# Patient Record
Sex: FEMALE | Race: WHITE | HISPANIC OR LATINO | URBAN - METROPOLITAN AREA
[De-identification: names, ages, dates, MRNs, and addresses within clinical notes are randomized per-mention and may not be internally consistent; named-entity substitution may affect disease eponyms.]

---

## 2021-08-26 ENCOUNTER — INPATIENT (INPATIENT)
Facility: HOSPITAL | Age: 86
LOS: 2 days | Discharge: ANOTHER HOSPITAL | DRG: 292 | End: 2021-08-29
Attending: INTERNAL MEDICINE | Admitting: INTERNAL MEDICINE
Payer: MEDICARE

## 2021-08-26 VITALS
DIASTOLIC BLOOD PRESSURE: 69 MMHG | TEMPERATURE: 98 F | OXYGEN SATURATION: 98 % | SYSTOLIC BLOOD PRESSURE: 115 MMHG | HEART RATE: 78 BPM | RESPIRATION RATE: 18 BRPM | HEIGHT: 63 IN | WEIGHT: 100.09 LBS

## 2021-08-26 DIAGNOSIS — Z95.0 PRESENCE OF CARDIAC PACEMAKER: ICD-10-CM

## 2021-08-26 DIAGNOSIS — Z29.9 ENCOUNTER FOR PROPHYLACTIC MEASURES, UNSPECIFIED: ICD-10-CM

## 2021-08-26 DIAGNOSIS — E11.9 TYPE 2 DIABETES MELLITUS WITHOUT COMPLICATIONS: ICD-10-CM

## 2021-08-26 DIAGNOSIS — E05.90 THYROTOXICOSIS, UNSPECIFIED WITHOUT THYROTOXIC CRISIS OR STORM: ICD-10-CM

## 2021-08-26 DIAGNOSIS — I10 ESSENTIAL (PRIMARY) HYPERTENSION: ICD-10-CM

## 2021-08-26 DIAGNOSIS — R74.01 ELEVATION OF LEVELS OF LIVER TRANSAMINASE LEVELS: ICD-10-CM

## 2021-08-26 DIAGNOSIS — I50.23 ACUTE ON CHRONIC SYSTOLIC (CONGESTIVE) HEART FAILURE: ICD-10-CM

## 2021-08-26 DIAGNOSIS — D69.6 THROMBOCYTOPENIA, UNSPECIFIED: ICD-10-CM

## 2021-08-26 DIAGNOSIS — I50.9 HEART FAILURE, UNSPECIFIED: ICD-10-CM

## 2021-08-26 DIAGNOSIS — D64.9 ANEMIA, UNSPECIFIED: ICD-10-CM

## 2021-08-26 DIAGNOSIS — N17.9 ACUTE KIDNEY FAILURE, UNSPECIFIED: ICD-10-CM

## 2021-08-26 DIAGNOSIS — E03.9 HYPOTHYROIDISM, UNSPECIFIED: ICD-10-CM

## 2021-08-26 DIAGNOSIS — I34.0 NONRHEUMATIC MITRAL (VALVE) INSUFFICIENCY: ICD-10-CM

## 2021-08-26 DIAGNOSIS — I48.20 CHRONIC ATRIAL FIBRILLATION, UNSPECIFIED: ICD-10-CM

## 2021-08-26 LAB
ALBUMIN SERPL ELPH-MCNC: 3.1 G/DL — LOW (ref 3.5–5)
ALP SERPL-CCNC: 188 U/L — HIGH (ref 40–120)
ALT FLD-CCNC: 177 U/L DA — HIGH (ref 10–60)
ANION GAP SERPL CALC-SCNC: 10 MMOL/L — SIGNIFICANT CHANGE UP (ref 5–17)
APTT BLD: 45.6 SEC — HIGH (ref 27.5–35.5)
AST SERPL-CCNC: 91 U/L — HIGH (ref 10–40)
BASOPHILS # BLD AUTO: 0.01 K/UL — SIGNIFICANT CHANGE UP (ref 0–0.2)
BASOPHILS NFR BLD AUTO: 0.1 % — SIGNIFICANT CHANGE UP (ref 0–2)
BILIRUB SERPL-MCNC: 1.2 MG/DL — SIGNIFICANT CHANGE UP (ref 0.2–1.2)
BUN SERPL-MCNC: 76 MG/DL — HIGH (ref 7–18)
CALCIUM SERPL-MCNC: 8.3 MG/DL — LOW (ref 8.4–10.5)
CHLORIDE SERPL-SCNC: 97 MMOL/L — SIGNIFICANT CHANGE UP (ref 96–108)
CO2 SERPL-SCNC: 29 MMOL/L — SIGNIFICANT CHANGE UP (ref 22–31)
CREAT SERPL-MCNC: 1.81 MG/DL — HIGH (ref 0.5–1.3)
EOSINOPHIL # BLD AUTO: 0.13 K/UL — SIGNIFICANT CHANGE UP (ref 0–0.5)
EOSINOPHIL NFR BLD AUTO: 1.5 % — SIGNIFICANT CHANGE UP (ref 0–6)
GLUCOSE BLDC GLUCOMTR-MCNC: 162 MG/DL — HIGH (ref 70–99)
GLUCOSE SERPL-MCNC: 186 MG/DL — HIGH (ref 70–99)
HCT VFR BLD CALC: 27.9 % — LOW (ref 34.5–45)
HGB BLD-MCNC: 8.6 G/DL — LOW (ref 11.5–15.5)
IMM GRANULOCYTES NFR BLD AUTO: 0.4 % — SIGNIFICANT CHANGE UP (ref 0–1.5)
INR BLD: 2.81 RATIO — HIGH (ref 0.88–1.16)
LYMPHOCYTES # BLD AUTO: 1.28 K/UL — SIGNIFICANT CHANGE UP (ref 1–3.3)
LYMPHOCYTES # BLD AUTO: 15 % — SIGNIFICANT CHANGE UP (ref 13–44)
MCHC RBC-ENTMCNC: 24 PG — LOW (ref 27–34)
MCHC RBC-ENTMCNC: 30.8 GM/DL — LOW (ref 32–36)
MCV RBC AUTO: 77.7 FL — LOW (ref 80–100)
MONOCYTES # BLD AUTO: 1.27 K/UL — HIGH (ref 0–0.9)
MONOCYTES NFR BLD AUTO: 14.9 % — HIGH (ref 2–14)
NEUTROPHILS # BLD AUTO: 5.79 K/UL — SIGNIFICANT CHANGE UP (ref 1.8–7.4)
NEUTROPHILS NFR BLD AUTO: 68.1 % — SIGNIFICANT CHANGE UP (ref 43–77)
NRBC # BLD: 0 /100 WBCS — SIGNIFICANT CHANGE UP (ref 0–0)
NT-PROBNP SERPL-SCNC: 3718 PG/ML — HIGH (ref 0–450)
PLATELET # BLD AUTO: 130 K/UL — LOW (ref 150–400)
POTASSIUM SERPL-MCNC: 4.5 MMOL/L — SIGNIFICANT CHANGE UP (ref 3.5–5.3)
POTASSIUM SERPL-SCNC: 4.5 MMOL/L — SIGNIFICANT CHANGE UP (ref 3.5–5.3)
PROT SERPL-MCNC: 6.1 G/DL — SIGNIFICANT CHANGE UP (ref 6–8.3)
PROTHROM AB SERPL-ACNC: 31.8 SEC — HIGH (ref 10.6–13.6)
RBC # BLD: 3.59 M/UL — LOW (ref 3.8–5.2)
RBC # FLD: 16.5 % — HIGH (ref 10.3–14.5)
SARS-COV-2 RNA SPEC QL NAA+PROBE: SIGNIFICANT CHANGE UP
SODIUM SERPL-SCNC: 136 MMOL/L — SIGNIFICANT CHANGE UP (ref 135–145)
TROPONIN I SERPL-MCNC: 0.11 NG/ML — HIGH (ref 0–0.04)
WBC # BLD: 8.51 K/UL — SIGNIFICANT CHANGE UP (ref 3.8–10.5)
WBC # FLD AUTO: 8.51 K/UL — SIGNIFICANT CHANGE UP (ref 3.8–10.5)

## 2021-08-26 PROCEDURE — 99285 EMERGENCY DEPT VISIT HI MDM: CPT

## 2021-08-26 PROCEDURE — 71045 X-RAY EXAM CHEST 1 VIEW: CPT | Mod: 26

## 2021-08-26 PROCEDURE — 93010 ELECTROCARDIOGRAM REPORT: CPT

## 2021-08-26 RX ORDER — FUROSEMIDE 40 MG
40 TABLET ORAL ONCE
Refills: 0 | Status: COMPLETED | OUTPATIENT
Start: 2021-08-26 | End: 2021-08-26

## 2021-08-26 RX ORDER — LOSARTAN POTASSIUM 100 MG/1
25 TABLET, FILM COATED ORAL DAILY
Refills: 0 | Status: DISCONTINUED | OUTPATIENT
Start: 2021-08-26 | End: 2021-08-26

## 2021-08-26 RX ORDER — ENOXAPARIN SODIUM 100 MG/ML
30 INJECTION SUBCUTANEOUS EVERY 12 HOURS
Refills: 0 | Status: DISCONTINUED | OUTPATIENT
Start: 2021-08-26 | End: 2021-08-27

## 2021-08-26 RX ORDER — ALBUTEROL 90 UG/1
1 AEROSOL, METERED ORAL EVERY 4 HOURS
Refills: 0 | Status: DISCONTINUED | OUTPATIENT
Start: 2021-08-26 | End: 2021-08-29

## 2021-08-26 RX ORDER — POLYETHYLENE GLYCOL 3350 17 G/17G
17 POWDER, FOR SOLUTION ORAL DAILY
Refills: 0 | Status: DISCONTINUED | OUTPATIENT
Start: 2021-08-26 | End: 2021-08-26

## 2021-08-26 RX ORDER — FUROSEMIDE 40 MG
40 TABLET ORAL EVERY 12 HOURS
Refills: 0 | Status: DISCONTINUED | OUTPATIENT
Start: 2021-08-26 | End: 2021-08-29

## 2021-08-26 RX ORDER — MONTELUKAST 4 MG/1
10 TABLET, CHEWABLE ORAL DAILY
Refills: 0 | Status: DISCONTINUED | OUTPATIENT
Start: 2021-08-26 | End: 2021-08-26

## 2021-08-26 RX ORDER — ENOXAPARIN SODIUM 100 MG/ML
40 INJECTION SUBCUTANEOUS EVERY 12 HOURS
Refills: 0 | Status: DISCONTINUED | OUTPATIENT
Start: 2021-08-26 | End: 2021-08-26

## 2021-08-26 RX ORDER — FERROUS SULFATE 325(65) MG
325 TABLET ORAL
Refills: 0 | Status: DISCONTINUED | OUTPATIENT
Start: 2021-08-26 | End: 2021-08-26

## 2021-08-26 RX ORDER — ATORVASTATIN CALCIUM 80 MG/1
10 TABLET, FILM COATED ORAL AT BEDTIME
Refills: 0 | Status: DISCONTINUED | OUTPATIENT
Start: 2021-08-26 | End: 2021-08-26

## 2021-08-26 RX ORDER — FOLIC ACID 0.8 MG
1 TABLET ORAL DAILY
Refills: 0 | Status: DISCONTINUED | OUTPATIENT
Start: 2021-08-26 | End: 2021-08-29

## 2021-08-26 RX ORDER — SENNA PLUS 8.6 MG/1
2 TABLET ORAL AT BEDTIME
Refills: 0 | Status: DISCONTINUED | OUTPATIENT
Start: 2021-08-26 | End: 2021-08-26

## 2021-08-26 RX ORDER — DIGOXIN 250 MCG
100 TABLET ORAL DAILY
Refills: 0 | Status: DISCONTINUED | OUTPATIENT
Start: 2021-08-26 | End: 2021-08-29

## 2021-08-26 RX ORDER — DIGOXIN 250 MCG
125 TABLET ORAL DAILY
Refills: 0 | Status: DISCONTINUED | OUTPATIENT
Start: 2021-08-26 | End: 2021-08-26

## 2021-08-26 RX ORDER — INSULIN LISPRO 100/ML
VIAL (ML) SUBCUTANEOUS
Refills: 0 | Status: DISCONTINUED | OUTPATIENT
Start: 2021-08-26 | End: 2021-08-29

## 2021-08-26 RX ORDER — ASPIRIN/CALCIUM CARB/MAGNESIUM 324 MG
324 TABLET ORAL ONCE
Refills: 0 | Status: COMPLETED | OUTPATIENT
Start: 2021-08-26 | End: 2021-08-26

## 2021-08-26 RX ORDER — APIXABAN 2.5 MG/1
2.5 TABLET, FILM COATED ORAL EVERY 12 HOURS
Refills: 0 | Status: DISCONTINUED | OUTPATIENT
Start: 2021-08-26 | End: 2021-08-26

## 2021-08-26 RX ORDER — LOSARTAN POTASSIUM 100 MG/1
1 TABLET, FILM COATED ORAL
Qty: 0 | Refills: 0 | DISCHARGE

## 2021-08-26 RX ORDER — INSULIN LISPRO 100/ML
VIAL (ML) SUBCUTANEOUS AT BEDTIME
Refills: 0 | Status: DISCONTINUED | OUTPATIENT
Start: 2021-08-26 | End: 2021-08-29

## 2021-08-26 RX ORDER — ISOSORBIDE MONONITRATE 60 MG/1
60 TABLET, EXTENDED RELEASE ORAL DAILY
Refills: 0 | Status: DISCONTINUED | OUTPATIENT
Start: 2021-08-26 | End: 2021-08-26

## 2021-08-26 RX ADMIN — Medication 40 MILLIGRAM(S): at 18:50

## 2021-08-26 NOTE — H&P ADULT - PROBLEM SELECTOR PLAN 1
-C/W shortness of breadth, O2 sat 99% on 2 l NC, dropped to 96% on RA with shortness of breadth   -Pt is on torsemide 10mg OD  -Chest X-ray showed pulmonary edema with cardiomegaly  -S/P 1 dose of Lasix 40mg IV in ED   -Will starting pt on Lasix 40mg IV BID  -Hx of severe MR and severe global systolic dysfunction  , at [facility] recently for Mitral clip   -Trop 0.107  ProBNP 3715  -Will trend troponin , no EKG changes noted   -Cardio Dr. Granados   -Pt will likely transfer to [place] tomorrow -C/W shortness of breadth, O2 sat 99% on 2 l NC, dropped to 96% on RA with shortness of breadth   -Pt is on torsemide 10mg OD  -Chest X-ray showed pulmonary edema with cardiomegaly  -S/P 1 dose of Lasix 40mg IV in ED   -Will starting pt on Lasix 40mg IV BID  -Hx of severe MR and severe global systolic dysfunction  , at [facility] recently for Mitral clip   -Trop 0.107  ProBNP 3715  -Will trend troponin , no EKG changes noted   -Cardio Dr. Granados   -Pt will likely transfer to [place] tomorrow  -Mamie Shaw [phone]  -Aunt Viviana [phone]  -Consult Palliative in Am if doesn't get transfer to [place]

## 2021-08-26 NOTE — ED ADULT TRIAGE NOTE - CHIEF COMPLAINT QUOTE
pt biba as per ems pt dens from [facility] for difficulty in breathing and difficulty in swallowing

## 2021-08-26 NOTE — H&P ADULT - PROBLEM SELECTOR PLAN 10
-On Eliquis   -Failed dysphagia screen   -Will change Eliquis to full dose Lovenox   -Will start on IV metoprolol 5mg q6h PRN for HR >130 -On Eliquis   -Failed dysphagia screen   -Will change Eliquis to full dose Lovenox   -Will change oral digoxin to IV 125mcg to 100mcg IV -On Eliquis   -Failed dysphagia screen   -Will change Eliquis to full dose Lovenox   -50mg once daily due to Cr Cl<30  -Will change oral digoxin to IV 125mcg to 100mcg IV

## 2021-08-26 NOTE — H&P ADULT - PROBLEM SELECTOR PLAN 2
-BUN 76 Cr 1.81  -Likely in the setting of Pre renal due to fluid overload   -S/P Lasix IV in ED 40mg   -Continue with Lasix 40mg IV BID  -Monitor BMP and Cr   -F/U Urine lytes

## 2021-08-26 NOTE — H&P ADULT - PROBLEM SELECTOR PLAN 7
-On Coreg, Torsemide and Imdur  -Failed dysphagia screen in ED  -Hold off Coreg and Imdur   -F/U S/S eval

## 2021-08-26 NOTE — ED PROVIDER NOTE - CLINICAL SUMMARY MEDICAL DECISION MAKING FREE TEXT BOX
84 y/o woman, h/o CHF, COPD, PPM, atrial fibrillation, severe mitral regurgitation, pulmonary HTN, DM, HTN, hypothyroidism, sent in from [facility] by Dr. Haile Granados, with difficulty breathing today, as well as b/l leg swelling.  She was recently at [facility] for MR, had eval for Mitral clip, but they advised for her condition to be strengthened prior to the procedure as per Dr. Granados--labs, EKG, CXR, admission.

## 2021-08-26 NOTE — H&P ADULT - HISTORY OF PRESENT ILLNESS
84 yo F with PMHx of HTN, COPD, AICD, COPD, Afib, severe MR, HTN, DM, Hyperthyroidism  86 yo F with PMHx of HTN, Asthma , AICD, Afib, severe MR, HTN, HF, DM, Hyperthyroidism, Severe constipation  86 yo F with PMHx of HTN, Asthma , AICD, Afib, severe MR, HTN, HF, DM, Hyperthyroidism, Severe constipation came for worsening shortness of breadth. Pt is a mild historian, Ashutosh, says that i am having shortness of breadth and LE edema for 3 weeks and now getting worse. She denies any chest pain, headaches, blurry vision, abdominal pain, dysuria, cough, chills, subjective fever. Spoke to Ms Shaw at bedside . She endorsed that 1 month ago pt went to [facility] for MR and evaluated for Mitral Clips but was rather sent to rehab for muscle strengthening/ improve functional status in order to get Surgery. Pt    86 yo F with PMHx of HTN, Asthma , AICD, Afib, severe MR, HTN, HF, DM, Hyperthyroidism, Severe constipation came for worsening shortness of breadth. Pt is a mild historian, Ashutosh, says that i am having shortness of breadth and LE edema for 3 weeks and now getting worse. She denies any chest pain, headaches, blurry vision, abdominal pain, dysuria, cough, chills, subjective fever. Spoke to Ms Valeria at bedside . She endorsed that 1 month ago pt went to [facility] for MR and evaluated for Mitral Clips but was rather sent to rehab for muscle strengthening/ improve functional status in order to get Surgery. In Rehab pt started having more difficulty in breathing and LE edema and was sent to hospital.    In ED: Pt failed dysphagia screen. S/P 1 dose of iv Lasix 40mg .   -Trop 0.107 Pro BNP 3718  -Pt will likely be transferred to [place] tomorrow.    84 yo F with PMHx of HTN, Asthma , AICD, Afib, severe MR, HTN, HF, DM, Hyperthyroidism, Severe constipation came for worsening shortness of breadth. Pt is a mild historian, Ashutosh, says that i am having shortness of breadth and LE edema for 3 weeks and now getting worse. She denies any chest pain, headaches, blurry vision, abdominal pain, dysuria, cough, chills, subjective fever. Spoke to Ms Shaw at bedside . She endorsed that 1 month ago pt went to [facility] for MR and evaluated for Mitral Clips but was rather sent to rehab for muscle strengthening/ improve functional status in order to get Surgery. In Rehab pt started having more difficulty in breathing and LE edema and was sent to hospital.    Niece Valeria [phone]  Aunt Viviana [phone]  Consult Palliative in Am if doesn't get transfer to [place]     In ED: Pt failed dysphagia screen. S/P 1 dose of iv Lasix 40mg .   -Trop 0.107 Pro BNP 3718, EKG no ST segment elevated noted   -Pt will likely be transferred to [place] tomorrow

## 2021-08-26 NOTE — H&P ADULT - NSHPPHYSICALEXAM_GEN_ALL_CORE
Vital Signs Last 24 Hrs  T(C): 36.4 (26 Aug 2021 23:21), Max: 36.7 (26 Aug 2021 14:57)  T(F): 97.6 (26 Aug 2021 23:21), Max: 98 (26 Aug 2021 14:57)  HR: 72 (26 Aug 2021 23:21) (71 - 78)  BP: 115/70 (26 Aug 2021 23:21) (101/63 - 115/70)  BP(mean): --  RR: 18 (26 Aug 2021 23:21) (18 - 18)  SpO2: 100% (26 Aug 2021 23:21) (98% - 100%)    GENERAL: Mild respiratory distress  EYES: EOMI, PERRLA,   NECK: Supple, No JVD  CHEST/LUNG: Mild crackles   HEART: Regular rate and rhythm; No murmurs, +ve S1 S2   ABDOMEN: Soft, Nontender, Nondistended; Bowel sounds present  NERVOUS SYSTEM:  Alert & Oriented X3, normal sensations and normal strength     EXTREMITIES:   No clubbing, cyanosis, or edema  LYMPH NODES : non palpable   PSYCH: normal mood and affect Vital Signs Last 24 Hrs  T(C): 36.4 (26 Aug 2021 23:21), Max: 36.7 (26 Aug 2021 14:57)  T(F): 97.6 (26 Aug 2021 23:21), Max: 98 (26 Aug 2021 14:57)  HR: 72 (26 Aug 2021 23:21) (71 - 78)  BP: 115/70 (26 Aug 2021 23:21) (101/63 - 115/70)  BP(mean): --  RR: 18 (26 Aug 2021 23:21) (18 - 18)  SpO2: 100% (26 Aug 2021 23:21) (98% - 100%)    GENERAL: Mild respiratory distress on RA, on NC2 lit comfortable , seborrheic keratosis noted on face and chest   EYES: EOMI, PERRLA,   NECK: Supple, No JVD  CHEST/LUNG: Mild crackles b/l on lower lobes   HEART: Irregular rate and rhythm; Pansystolic Murmur , +ve S1 S2   ABDOMEN: Soft, Nontender, Nondistended; Bowel sounds present  NERVOUS SYSTEM:  Alert & Oriented X2, normal sensations and normal strength     EXTREMITIES:   +2 LE edema Vital Signs Last 24 Hrs  T(C): 36.4 (26 Aug 2021 23:21), Max: 36.7 (26 Aug 2021 14:57)  T(F): 97.6 (26 Aug 2021 23:21), Max: 98 (26 Aug 2021 14:57)  HR: 72 (26 Aug 2021 23:21) (71 - 78)  BP: 115/70 (26 Aug 2021 23:21) (101/63 - 115/70)  BP(mean): --  RR: 18 (26 Aug 2021 23:21) (18 - 18)  SpO2: 100% (26 Aug 2021 23:21) (98% - 100%)    GENERAL: Mild respiratory distress on RA, on NC2 lit comfortable , seborrheic keratosis noted on face and chest   EYES: EOMI, PERRLA,   NECK: Supple, No JVD  CHEST/LUNG: Mild crackles b/l on lower lobes   HEART: Irregular rate and rhythm; Pansystolic Murmur , +ve S1 S2 , +ve PPM  ABDOMEN: Soft, Nontender, Nondistended; Bowel sounds present  NERVOUS SYSTEM:  Alert & Oriented X2, normal sensations and normal strength     EXTREMITIES:   +2 LE edema

## 2021-08-26 NOTE — ED PROVIDER NOTE - NSICDXPASTMEDICALHX_GEN_ALL_CORE_FT
PAST MEDICAL HISTORY:  Arrhythmia     Artificial Cardiac Pacemaker     Asthma     CHF (Congestive Heart Failure)     COPD (Chronic Obstructive Pulmonary Disease)     CVA (Cerebral Vascular Accident)     DM (Diabetes Mellitus)

## 2021-08-26 NOTE — ED ADULT NURSE NOTE - NSIMPLEMENTINTERV_GEN_ALL_ED
Implemented All Fall with Harm Risk Interventions:  Ute to call system. Call bell, personal items and telephone within reach. Instruct patient to call for assistance. Room bathroom lighting operational. Non-slip footwear when patient is off stretcher. Physically safe environment: no spills, clutter or unnecessary equipment. Stretcher in lowest position, wheels locked, appropriate side rails in place. Provide visual cue, wrist band, yellow gown, etc. Monitor gait and stability. Monitor for mental status changes and reorient to person, place, and time. Review medications for side effects contributing to fall risk. Reinforce activity limits and safety measures with patient and family. Provide visual clues: red socks.

## 2021-08-26 NOTE — ED PROVIDER NOTE - PROGRESS NOTE DETAILS
Dr. Granados informed and accepts admission for decompensated heart failure.  He says that her baseline Cr is about 1.6 (only slightly worse today) and H/H today are about same as baseline.

## 2021-08-26 NOTE — H&P ADULT - FAMILY/RELATIVE
Spoke to Niece. Pt doesn't have any children, siblings are above 80 as well. Pt has niece and nephews. Ms Shaw was at bedside. Phone # [phone]. Discussed GOC in detail. Explained role of CPR and intubation in pt's case given her multiple comorbs and Spoke to Niece. Pt doesn't have any children, siblings are above 80 as well. Pt has niece and nephews. Ms Shaw was at bedside. Phone # [phone]. Discussed GOC in detail. Explained role of CPR and intubation in pt's case given her multiple comorbs, it's hard to get extubate if gets intubated. Resident explained at length  FULL CODE vs DNR/DNI. She said she will discuss with the family members. Palliative Consult in am.

## 2021-08-26 NOTE — H&P ADULT - PROBLEM SELECTOR PLAN 5
-Pt with elevated of alk phos 188 AST 91   -? in the setting of passive hepatic congestion  -No JVD noted on exam  -Monitor LFTs

## 2021-08-26 NOTE — ED PROVIDER NOTE - OBJECTIVE STATEMENT
86 y/o woman, h/o CHF, COPD, PPM, atrial fibrillation, severe mitral regurgitation, pulmonary HTN, DM, HTN, hypothyroidism, sent in from [facility] by Dr. Haile Granados, with difficulty breathing today, as well as b/l leg swelling.  Denies CP/fever/cough.  She says she has difficulty urinating.  She was recently at [facility] for MR, had eval for Mitral clip, but they advised for her condition to be strengthened prior to the procedure as per Dr. Granados.

## 2021-08-27 LAB
A1C WITH ESTIMATED AVERAGE GLUCOSE RESULT: 7.4 % — HIGH (ref 4–5.6)
ALBUMIN SERPL ELPH-MCNC: 3.1 G/DL — LOW (ref 3.5–5)
ALP SERPL-CCNC: 189 U/L — HIGH (ref 40–120)
ALT FLD-CCNC: 171 U/L DA — HIGH (ref 10–60)
ANION GAP SERPL CALC-SCNC: 7 MMOL/L — SIGNIFICANT CHANGE UP (ref 5–17)
AST SERPL-CCNC: 89 U/L — HIGH (ref 10–40)
BILIRUB SERPL-MCNC: 1.8 MG/DL — HIGH (ref 0.2–1.2)
BUN SERPL-MCNC: 79 MG/DL — HIGH (ref 7–18)
CALCIUM SERPL-MCNC: 8.3 MG/DL — LOW (ref 8.4–10.5)
CHLORIDE SERPL-SCNC: 98 MMOL/L — SIGNIFICANT CHANGE UP (ref 96–108)
CHOLEST SERPL-MCNC: 89 MG/DL — SIGNIFICANT CHANGE UP
CO2 SERPL-SCNC: 33 MMOL/L — HIGH (ref 22–31)
COVID-19 SPIKE DOMAIN AB INTERP: NEGATIVE — SIGNIFICANT CHANGE UP
COVID-19 SPIKE DOMAIN ANTIBODY RESULT: 0.4 U/ML — SIGNIFICANT CHANGE UP
CREAT ?TM UR-MCNC: 49 MG/DL — SIGNIFICANT CHANGE UP
CREAT SERPL-MCNC: 1.66 MG/DL — HIGH (ref 0.5–1.3)
ESTIMATED AVERAGE GLUCOSE: 166 MG/DL — HIGH (ref 68–114)
FERRITIN SERPL-MCNC: 32 NG/ML — SIGNIFICANT CHANGE UP (ref 15–150)
GLUCOSE BLDC GLUCOMTR-MCNC: 137 MG/DL — HIGH (ref 70–99)
GLUCOSE BLDC GLUCOMTR-MCNC: 140 MG/DL — HIGH (ref 70–99)
GLUCOSE BLDC GLUCOMTR-MCNC: 156 MG/DL — HIGH (ref 70–99)
GLUCOSE SERPL-MCNC: 127 MG/DL — HIGH (ref 70–99)
HCT VFR BLD CALC: 28.4 % — LOW (ref 34.5–45)
HDLC SERPL-MCNC: 32 MG/DL — LOW
HGB BLD-MCNC: 8.7 G/DL — LOW (ref 11.5–15.5)
IRON SATN MFR SERPL: 11 UG/DL — LOW (ref 40–150)
IRON SATN MFR SERPL: 3 % — LOW (ref 15–50)
LIPID PNL WITH DIRECT LDL SERPL: 46 MG/DL — SIGNIFICANT CHANGE UP
MAGNESIUM SERPL-MCNC: 2.6 MG/DL — SIGNIFICANT CHANGE UP (ref 1.6–2.6)
MCHC RBC-ENTMCNC: 24.4 PG — LOW (ref 27–34)
MCHC RBC-ENTMCNC: 30.6 GM/DL — LOW (ref 32–36)
MCV RBC AUTO: 79.6 FL — LOW (ref 80–100)
NON HDL CHOLESTEROL: 57 MG/DL — SIGNIFICANT CHANGE UP
NRBC # BLD: 0 /100 WBCS — SIGNIFICANT CHANGE UP (ref 0–0)
OSMOLALITY UR: 325 MOS/KG — SIGNIFICANT CHANGE UP (ref 50–1200)
PHOSPHATE SERPL-MCNC: 4.8 MG/DL — HIGH (ref 2.5–4.5)
PLATELET # BLD AUTO: 136 K/UL — LOW (ref 150–400)
POTASSIUM SERPL-MCNC: 4.6 MMOL/L — SIGNIFICANT CHANGE UP (ref 3.5–5.3)
POTASSIUM SERPL-SCNC: 4.6 MMOL/L — SIGNIFICANT CHANGE UP (ref 3.5–5.3)
PROT SERPL-MCNC: 5.8 G/DL — LOW (ref 6–8.3)
RBC # BLD: 3.57 M/UL — LOW (ref 3.8–5.2)
RBC # BLD: 3.57 M/UL — LOW (ref 3.8–5.2)
RBC # FLD: 16.8 % — HIGH (ref 10.3–14.5)
RETICS #: 123.9 K/UL — SIGNIFICANT CHANGE UP (ref 25–125)
RETICS/RBC NFR: 3.5 % — HIGH (ref 0.5–2.5)
SARS-COV-2 IGG+IGM SERPL QL IA: 0.4 U/ML — SIGNIFICANT CHANGE UP
SARS-COV-2 IGG+IGM SERPL QL IA: NEGATIVE — SIGNIFICANT CHANGE UP
SODIUM SERPL-SCNC: 138 MMOL/L — SIGNIFICANT CHANGE UP (ref 135–145)
SODIUM UR-SCNC: 21 MMOL/L — SIGNIFICANT CHANGE UP
TIBC SERPL-MCNC: 340 UG/DL — SIGNIFICANT CHANGE UP (ref 250–450)
TRIGL SERPL-MCNC: 56 MG/DL — SIGNIFICANT CHANGE UP
TROPONIN I SERPL-MCNC: 0.12 NG/ML — HIGH (ref 0–0.04)
TROPONIN I SERPL-MCNC: 0.12 NG/ML — HIGH (ref 0–0.04)
UIBC SERPL-MCNC: 329 UG/DL — SIGNIFICANT CHANGE UP (ref 110–370)
UUN UR-MCNC: 492 MG/DL — SIGNIFICANT CHANGE UP
WBC # BLD: 9.79 K/UL — SIGNIFICANT CHANGE UP (ref 3.8–10.5)
WBC # FLD AUTO: 9.79 K/UL — SIGNIFICANT CHANGE UP (ref 3.8–10.5)

## 2021-08-27 RX ORDER — LORATADINE 10 MG/1
1 TABLET ORAL
Qty: 0 | Refills: 0 | DISCHARGE

## 2021-08-27 RX ORDER — METFORMIN HYDROCHLORIDE 850 MG/1
1 TABLET ORAL
Qty: 0 | Refills: 0 | DISCHARGE

## 2021-08-27 RX ORDER — FUROSEMIDE 40 MG
40 TABLET ORAL
Qty: 0 | Refills: 0 | DISCHARGE
Start: 2021-08-27

## 2021-08-27 RX ORDER — DOBUTAMINE HCL 250MG/20ML
0 VIAL (ML) INTRAVENOUS
Qty: 0 | Refills: 0 | DISCHARGE
Start: 2021-08-27

## 2021-08-27 RX ORDER — ALBUTEROL 90 UG/1
1 AEROSOL, METERED ORAL
Qty: 0 | Refills: 0 | DISCHARGE
Start: 2021-08-27

## 2021-08-27 RX ORDER — ENOXAPARIN SODIUM 100 MG/ML
50 INJECTION SUBCUTANEOUS DAILY
Refills: 0 | Status: DISCONTINUED | OUTPATIENT
Start: 2021-08-27 | End: 2021-08-29

## 2021-08-27 RX ORDER — DOBUTAMINE HCL 250MG/20ML
2.5 VIAL (ML) INTRAVENOUS
Qty: 500 | Refills: 0 | Status: DISCONTINUED | OUTPATIENT
Start: 2021-08-27 | End: 2021-08-29

## 2021-08-27 RX ORDER — INSULIN LISPRO 100/ML
0 VIAL (ML) SUBCUTANEOUS
Qty: 0 | Refills: 0 | DISCHARGE
Start: 2021-08-27

## 2021-08-27 RX ADMIN — ENOXAPARIN SODIUM 50 MILLIGRAM(S): 100 INJECTION SUBCUTANEOUS at 13:01

## 2021-08-27 RX ADMIN — Medication 1: at 13:04

## 2021-08-27 RX ADMIN — Medication 1 MILLIGRAM(S): at 13:03

## 2021-08-27 RX ADMIN — Medication 40 MILLIGRAM(S): at 18:26

## 2021-08-27 RX ADMIN — Medication 100 MICROGRAM(S): at 13:02

## 2021-08-27 RX ADMIN — Medication 3.41 MICROGRAM(S)/KG/MIN: at 07:31

## 2021-08-27 NOTE — DISCHARGE NOTE PROVIDER - NSDCMRMEDTOKEN_GEN_ALL_CORE_FT
acetaminophen 325 mg oral tablet: 2 tab(s) orally every 6 hours, As Needed  albuterol 90 mcg/inh inhalation aerosol: 1 puff(s) inhaled every 4 hours, As needed, Shortness of Breath and/or Wheezing  atorvastatin 10 mg oral tablet: 1 tab(s) orally once a day  Calcium 500+D oral tablet, chewable: 1 tab(s) orally 2 times a day  Cozaar 25 mg oral tablet: 1 tab(s) orally once a day  Digox 125 mcg (0.125 mg) oral tablet: 1 tab(s) orally once a day  DOBUTamine:   docusate potassium 100 mg oral capsule: orally once a day (at bedtime)  Eliquis 2.5 mg oral tablet: 1 tab(s) orally 2 times a day  Fleet Enema 19 g-7 g rectal enema: 133 milliliter(s) rectal once, As Needed  folic acid 1 mg oral tablet: 1 tab(s) orally once a day  furosemide 100 mg/100 mL-0.9% intravenous solution: 40 milliliter(s) intravenous every 12 hours  Imdur 60 mg oral tablet, extended release: 1 tab(s) orally once a day (in the morning)  insulin lispro 100 units/mL injectable solution:  injectable   insulin lispro 100 units/mL injectable solution:  injectable   lactulose 10 g/15 mL oral syrup: 15 milliliter(s) orally once a day  magnesium oxide 400 mg oral tablet: 1 tab(s) orally once a day  methIMAzole 5 mg oral tablet: 0.5 tab(s) orally once a day  MiraLax oral powder for reconstitution:   montelukast 10 mg oral tablet: 1 tab(s) orally once a day  pantoprazole 40 mg oral delayed release tablet: 1 tab(s) orally once a day  Prolia 60 mg/mL subcutaneous solution: 1 dose(s) subcutaneous every 6 hours  Senna 8.6 mg oral tablet: 1 tab(s) orally once a day (at bedtime)  Vitamin D3 125 mcg (5000 intl units) oral capsule: 1 cap(s) orally once a week

## 2021-08-27 NOTE — PHYSICAL THERAPY INITIAL EVALUATION ADULT - CRITERIA FOR SKILLED THERAPEUTIC INTERVENTIONS
return to SNF/impairments found/functional limitations in following categories/risk reduction/prevention/anticipated discharge recommendation

## 2021-08-27 NOTE — SWALLOW BEDSIDE ASSESSMENT ADULT - SLP PERTINENT HISTORY OF CURRENT PROBLEM
85F. PMHx of HTN, Asthma , AICD, Afib, severe MR, HTN, HF, DM, Hyperthyroidism, & severe constipation. Pt reportedly came in for worsening shortness of breath and LE edema for 3 weeks and now getting worse. Pt is reportedly a mild historian. She reportedly denies any chest pain, headaches, blurry vision, abdominal pain, dysuria, cough, chills, subjective fever. Pt reportedly endorsed that 1 month ago pt went to [facility] for MR and evaluated for Mitral Clips but was rather sent to rehab for muscle strengthening/ improve functional status in order to get surgery. In Rehab pt started having more difficulty in breathing and LE edema and was sent to hospital.

## 2021-08-27 NOTE — DISCHARGE NOTE PROVIDER - NSDCCPCAREPLAN_GEN_ALL_CORE_FT
PRINCIPAL DISCHARGE DIAGNOSIS  Diagnosis: Acute exacerbation of CHF (congestive heart failure)  Assessment and Plan of Treatment: You came in with shortness of breath and leg swelling. You have history of heart failure and you have an ICD. You are going to be transferred to [place] for a cardiac catheterization (Coronary Angiogram). Please follow up with your primary care physician in one week to inform them of your recent hospitalization and further management of your medical conditions.        SECONDARY DISCHARGE DIAGNOSES  Diagnosis: Mitral regurgitation  Assessment and Plan of Treatment: You have marcello regurgitation. You are continued on your home blood pressure medication. Please follow up with your primary care physician in one week to inform them of your recent hospitalization and further management of your medical conditions.      Diagnosis: Chronic atrial fibrillation  Assessment and Plan of Treatment: You were diagnosed with/have history of atrial fibrillation. Please continue to take your medications coreg and eliquis as directed. Please follow up with PCP within 2 weeks of discharge.      Diagnosis: DM (diabetes mellitus)  Assessment and Plan of Treatment: Continue with your blood sugar medication. Your results of your A1c has not yet returned prior to your transfer.  Please check your blood sugar levels twice daily - Morning/Afternoon, and Lunch/Bedtime the following day. Keep a record of your blood sugar readings  You must maintain a healthy diet that consists of low sugar and low fat. Your diet must consist of mostly vegetables. Please limit your intake of high sugar and high carbohydrate foods such as pasta, rice, and bread. Exercise frequently if possible. Follow up with primary care physician within one week of your discharge to further manage your diabetes and monitor your Hemoglobin A1c levels after 3 months to evaluate your diabetes control.      Diagnosis: Transaminitis  Assessment and Plan of Treatment: You have elevated liver enzymes which is likely due to congestion of your liver from your heart failure exacerbation. Please follow up with your primary care physician in one week for further management and surveillance of your liver function.      Diagnosis: JUANY (acute kidney injury)  Assessment and Plan of Treatment: You had acute kidney injury likely because of your heart failure exacerbation. Your kidney function was improving prior to your transfer. You will need to see your PCP within 1 week of your discharge for further management and surveillance of your kidney function.      Diagnosis: Anemia  Assessment and Plan of Treatment: You were found to have anemia that is likely due to iron deficiency and chronic disease. You will need to be on iron supplements on discharge. Please follow up with your primary care physician in one week to inform them of your recent hospitalization and further management of your medical conditions.      Diagnosis: HTN (hypertension)  Assessment and Plan of Treatment: You have high blood pressure. Please continue to taking your medications as prescribed. Please measure your blood pressure at least once daily. Maintain a healthy diet which includes incorporating more vegetables and decreasing the amount of salt (low sodium) and sugar in your diet such as rice, bread, and pasta low sugar, low fat, low sodium diet. Exercise frequently if possible.  Please follow up with your primary care provider within one week of your discharge.

## 2021-08-27 NOTE — DISCHARGE NOTE PROVIDER - CARE PROVIDER_API CALL
Haile Granados)  Cardiology  [address] [place] [address]  Phone: [phone]  Fax: [phone]  Follow Up Time: 1 week

## 2021-08-27 NOTE — PROGRESS NOTE ADULT - SUBJECTIVE AND OBJECTIVE BOX
PGY-1 Progress Note discussed with attending    PAGER #: [phone]] TILL 5:00 PM  PLEASE CONTACT ON CALL TEAM:  - On Call Team (Please refer to Shelli) FROM 5:00 PM - 8:30PM  - Nightfloat Team FROM 8:30 -7:30 AM    CHIEF COMPLAINT & BRIEF HOSPITAL COURSE:  86 yo F with PMHx of HTN, Asthma , AICD, Afib, severe MR, HTN, HF, DM, Hyperthyroidism, Severe constipation came for worsening shortness of breadth. Pt is a mild historian, Ashutosh, says that i am having shortness of breadth and LE edema for 3 weeks and now getting worse. She denies any chest pain, headaches, blurry vision, abdominal pain, dysuria, cough, chills, subjective fever. Spoke to Ms Shaw at bedside . She endorsed that 1 month ago pt went to [facility] for MR and evaluated for Mitral Clips but was rather sent to rehab for muscle strengthening/ improve functional status in order to get Surgery. In Rehab pt started having more difficulty in breathing and LE edema and was sent to hospital.    INTERVAL HPI/OVERNIGHT EVENTS:   Pt endorses improving in her leg swelling, but still has SOB.    REVIEW OF SYSTEMS:  CONSTITUTIONAL: No fever, weight loss, or fatigue  RESPIRATORY: +ve SOB, No cough, wheezing, chills or hemoptysis  CARDIOVASCULAR: No chest pain, palpitations, dizziness, or leg swelling  GASTROINTESTINAL: No abdominal pain. No nausea, vomiting, or hematemesis; No diarrhea or constipation. No melena or hematochezia.  GENITOURINARY: No dysuria or hematuria, urinary frequency  NEUROLOGICAL: No headaches, memory loss, loss of strength, numbness, or tremors  SKIN: No itching, burning, rashes, or lesions     Vital Signs Last 24 Hrs  T(C): 36.7 (28 Aug 2021 04:30), Max: 36.8 (27 Aug 2021 23:45)  T(F): 98 (28 Aug 2021 04:30), Max: 98.2 (27 Aug 2021 23:45)  HR: 86 (28 Aug 2021 04:30) (70 - 96)  BP: 110/59 (28 Aug 2021 04:30) (94/46 - 121/55)  BP(mean): 72 (27 Aug 2021 10:53) (72 - 72)  RR: 18 (28 Aug 2021 04:30) (16 - 18)  SpO2: 100% (28 Aug 2021 04:30) (100% - 100%)    PHYSICAL EXAMINATION:  GENERAL: NAD, well built  HEAD:  Atraumatic, Normocephalic  EYES:  conjunctiva and sclera clear  NECK: Supple, No JVD, Normal thyroid  CHEST/LUNG: Clear to auscultation. Clear to percussion bilaterally; No rales, rhonchi, wheezing, or rubs  HEART: Regular rate and rhythm; No murmurs, rubs, or gallops  ABDOMEN: Soft, Nontender, Nondistended; Bowel sounds present  NERVOUS SYSTEM:  Alert & Oriented X3,    EXTREMITIES:  1+ LE edema, 2+ Peripheral Pulses, No clubbing or cyanosis  SKIN: warm dry                          9.1    8.03  )-----------( 132      ( 28 Aug 2021 06:34 )             30.3     08-28    138  |  98  |  69<H>  ----------------------------<  107<H>  4.2   |  34<H>  |  1.16    Ca    7.9<L>      28 Aug 2021 06:34  Phos  4.8     08-27  Mg     2.6     08-27    TPro  5.8<L>  /  Alb  3.1<L>  /  TBili  1.8<H>  /  DBili  x   /  AST  89<H>  /  ALT  171<H>  /  AlkPhos  189<H>  08-27    LIVER FUNCTIONS - ( 27 Aug 2021 06:53 )  Alb: 3.1 g/dL / Pro: 5.8 g/dL / ALK PHOS: 189 U/L / ALT: 171 U/L DA / AST: 89 U/L / GGT: x           CARDIAC MARKERS ( 27 Aug 2021 06:53 )  0.124 ng/mL / x     / x     / x     / x      CARDIAC MARKERS ( 27 Aug 2021 03:37 )  0.116 ng/mL / x     / x     / x     / x      CARDIAC MARKERS ( 26 Aug 2021 17:16 )  0.107 ng/mL / x     / x     / x     / x          PT/INR - ( 26 Aug 2021 17:33 )   PT: 31.8 sec;   INR: 2.81 ratio         PTT - ( 26 Aug 2021 17:33 )  PTT:45.6 sec    CAPILLARY BLOOD GLUCOSE      RADIOLOGY & ADDITIONAL TESTS:

## 2021-08-27 NOTE — PROGRESS NOTE ADULT - PROBLEM SELECTOR PLAN 1
-C/W shortness of breadth, O2 sat 99% on 2 l NC, dropped to 96% on RA with shortness of breadth   -Pt is on torsemide 10mg OD  -Chest X-ray showed pulmonary edema with cardiomegaly  -S/P 1 dose of Lasix 40mg IV in ED   -Will starting pt on Lasix 40mg IV BID  -Hx of severe MR and severe global systolic dysfunction  , at [facility] recently for Mitral clip   -Trop 0.107  ProBNP 3715  -Will trend troponin , no EKG changes noted   -Cardio Dr. Granados   -Pt will be transferred to [facility] Val[place] [phone]  -Aunt Viviana [phone]  -Palliative consulted

## 2021-08-27 NOTE — SWALLOW BEDSIDE ASSESSMENT ADULT - COMMENTS
Pt received HOB 90° by SLP. AA+O x3. (+) Pt did not have hearing aids in 2/2 to low battery, complained of pain in throat.

## 2021-08-27 NOTE — SWALLOW BEDSIDE ASSESSMENT ADULT - CONSISTENCIES ADMINISTERED
water x3 sips/thin liquid ice chips x 4 puree Applesauce w/ laura cracker x 3 teaspoons/mech soft x 3 sips/nectar thick

## 2021-08-27 NOTE — DISCHARGE NOTE PROVIDER - HOSPITAL COURSE
84 yo F with PMHx of HTN, Asthma , AICD, Afib, severe MR, HTN, HF, DM, Hyperthyroidism, Severe constipation came for worsening shortness of breadth. Pt is a mild historian, Ashutosh, says that i am having shortness of breadth and LE edema for 3 weeks and now getting worse. She denies any chest pain, headaches, blurry vision, abdominal pain, dysuria, cough, chills, subjective fever. Spoke to Ms Valeria at bedside . She endorsed that 1 month ago pt went to [facility] for MR and evaluated for Mitral Clips but was rather sent to rehab for muscle strengthening/ improve functional status in order to get Surgery. In Rehab pt started having more difficulty in breathing and LE edema and was sent to hospital and admitted for acute on chronic CHF.    Acute on chronic systolic congestive heart failure.   C/W shortness of breadth, O2 sat 99% on 2 l NC, dropped to 96% on RA with shortness of breadth. Chest X-ray showed pulmonary edema with cardiomegaly. S/P 1 dose of Lasix 40mg IV in ED. Will starting pt on Lasix 40mg IV BID. Hx of severe MR and severe global systolic dysfunction  , at [facility] recently for Mitral clip. Trop 0.107 > 0.116, trending. ProBNP 3715. Pt for transfer to [facility] for cardiac cath.    JUANY (acute kidney injury).   SCr 1.81 > 1.66, improving on lasix likely pre-renal due to fluid overload.    Anemia.   Pt with hgb of 8.6 MCV 77.7. Hypochromic microcytic. 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     Transaminitis.   Pt with elevated of alk phos 188 AST 91  likely elevated in the setting of passive hepatic congestion. Monitor LFTs    Mitral regurgitation.   Pt with severe MR. Pt to be transferred to [place] for cardiac cath. Held off on coreg and imdur on admission as pt failed dysphagia screen in ED. Seen by swallow therapist recommending mechanical soft diet. Resume coreg and imdur.    DM (diabetes mellitus).   On metformin at home started on SSI q6h. f/u A1c    Chronic atrial fibrillation.   Started on full dose lovenox as she did not pass dysphagia screen. Restarted eliquis after swallow eval.    Patient is able to tolerate soft diet prior to discharge. Patient is stable for discharge per attending and is advised to follow up with PCP as outpatient. Please refer to patient's complete medical chart with documents for a full hospital course, for this is only a brief summary.

## 2021-08-27 NOTE — ADVANCED PRACTICE NURSE CONSULT - RECOMMEDATIONS
-Clean all wounds with normal saline and apply skin prep to the surrounding skin  -Apply a Foam dressing to the Sacrococcyx area wound Q 72hrs PRN  -Elevate/float the patients heels using heel protectors and reposition the patient Q 2hrs using wedges or pillows

## 2021-08-27 NOTE — ADVANCED PRACTICE NURSE CONSULT - ASSESSMENT
This is a 85yr old female patient admitted for Heart Failure, presenting with the following:  -There is a Stage 1 Pressure Injury to the Bilateral Heels, as evident by non-blanchable erythema  -There is an intact circular DTI to the Sacrococcyx Area without drainage

## 2021-08-28 LAB
ANION GAP SERPL CALC-SCNC: 6 MMOL/L — SIGNIFICANT CHANGE UP (ref 5–17)
BUN SERPL-MCNC: 69 MG/DL — HIGH (ref 7–18)
CALCIUM SERPL-MCNC: 7.9 MG/DL — LOW (ref 8.4–10.5)
CHLORIDE SERPL-SCNC: 98 MMOL/L — SIGNIFICANT CHANGE UP (ref 96–108)
CO2 SERPL-SCNC: 34 MMOL/L — HIGH (ref 22–31)
CREAT SERPL-MCNC: 1.16 MG/DL — SIGNIFICANT CHANGE UP (ref 0.5–1.3)
GLUCOSE BLDC GLUCOMTR-MCNC: 136 MG/DL — HIGH (ref 70–99)
GLUCOSE BLDC GLUCOMTR-MCNC: 159 MG/DL — HIGH (ref 70–99)
GLUCOSE BLDC GLUCOMTR-MCNC: 194 MG/DL — HIGH (ref 70–99)
GLUCOSE BLDC GLUCOMTR-MCNC: 213 MG/DL — HIGH (ref 70–99)
GLUCOSE SERPL-MCNC: 107 MG/DL — HIGH (ref 70–99)
HCT VFR BLD CALC: 30.3 % — LOW (ref 34.5–45)
HGB BLD-MCNC: 9.1 G/DL — LOW (ref 11.5–15.5)
MCHC RBC-ENTMCNC: 23.8 PG — LOW (ref 27–34)
MCHC RBC-ENTMCNC: 30 GM/DL — LOW (ref 32–36)
MCV RBC AUTO: 79.1 FL — LOW (ref 80–100)
NRBC # BLD: 0 /100 WBCS — SIGNIFICANT CHANGE UP (ref 0–0)
PLATELET # BLD AUTO: 132 K/UL — LOW (ref 150–400)
POTASSIUM SERPL-MCNC: 4.2 MMOL/L — SIGNIFICANT CHANGE UP (ref 3.5–5.3)
POTASSIUM SERPL-SCNC: 4.2 MMOL/L — SIGNIFICANT CHANGE UP (ref 3.5–5.3)
RBC # BLD: 3.83 M/UL — SIGNIFICANT CHANGE UP (ref 3.8–5.2)
RBC # FLD: 16.7 % — HIGH (ref 10.3–14.5)
SODIUM SERPL-SCNC: 138 MMOL/L — SIGNIFICANT CHANGE UP (ref 135–145)
TROPONIN I SERPL-MCNC: 0.13 NG/ML — HIGH (ref 0–0.04)
TROPONIN I SERPL-MCNC: 0.13 NG/ML — HIGH (ref 0–0.04)
WBC # BLD: 8.03 K/UL — SIGNIFICANT CHANGE UP (ref 3.8–10.5)
WBC # FLD AUTO: 8.03 K/UL — SIGNIFICANT CHANGE UP (ref 3.8–10.5)

## 2021-08-28 RX ADMIN — Medication 100 MICROGRAM(S): at 12:17

## 2021-08-28 RX ADMIN — Medication 1: at 17:22

## 2021-08-28 RX ADMIN — ENOXAPARIN SODIUM 50 MILLIGRAM(S): 100 INJECTION SUBCUTANEOUS at 12:18

## 2021-08-28 RX ADMIN — Medication 40 MILLIGRAM(S): at 06:30

## 2021-08-28 RX ADMIN — Medication 1 MILLIGRAM(S): at 12:18

## 2021-08-28 RX ADMIN — Medication 40 MILLIGRAM(S): at 17:22

## 2021-08-28 RX ADMIN — Medication 3.41 MICROGRAM(S)/KG/MIN: at 12:17

## 2021-08-28 RX ADMIN — Medication 1: at 12:17

## 2021-08-28 NOTE — PROGRESS NOTE ADULT - PROBLEM SELECTOR PLAN 5
-Pt with elevated of alk phos 188 AST 91   -? in the setting of passive hepatic congestion  -No JVD noted on exam  -Monitor LFTs
-Pt with elevated of alk phos 188 AST 91   -? in the setting of passive hepatic congestion  -No JVD noted on exam  -Monitor LFTs

## 2021-08-28 NOTE — PROGRESS NOTE ADULT - PROBLEM SELECTOR PLAN 2
-BUN 76 Cr 1.81  -Likely in the setting of Pre renal due to fluid overload   -S/P Lasix IV in ED 40mg   -Continue with Lasix 40mg IV BID  -Cr trended down: 1.66 -> 1.16  -Pre-renal JUANY
-BUN 76 Cr 1.81  -Likely in the setting of Pre renal due to fluid overload   -S/P Lasix IV in ED 40mg   -Continue with Lasix 40mg IV BID  -Cr trended down: 1.66 -> 1.16  -Pre-renal JUANY

## 2021-08-28 NOTE — PROGRESS NOTE ADULT - PROBLEM SELECTOR PROBLEM 1
Acute on chronic systolic congestive heart failure
Acute on chronic systolic congestive heart failure
Detail Level: Detailed

## 2021-08-28 NOTE — PROGRESS NOTE ADULT - PROBLEM SELECTOR PLAN 3
-Pt with hgb of 8.6 MCV 77.7  -No active s/s of bleeding   -Total Iron: 11 (L), % Iron Saturation 3 (L).  -Likely DAVE
-Pt with hgb of 8.6 MCV 77.7  -No active s/s of bleeding   -Total Iron: 11 (L), % Iron Saturation 3 (L).  -Likely DAVE

## 2021-08-28 NOTE — PROGRESS NOTE ADULT - ASSESSMENT
84 yo F with multiple comorbidities admitted for Acute on Chronic CHF
86 yo F with multiple comorbidities admitted for Acute on Chronic CHF

## 2021-08-28 NOTE — PROGRESS NOTE ADULT - PROBLEM SELECTOR PLAN 6
-Pt with severe MR   -Failed dysphagia screen in ED  -Hold off Coreg and Imdur
-Pt with severe MR   -Failed dysphagia screen in ED  -Hold off Coreg and Imdur

## 2021-08-28 NOTE — PROGRESS NOTE ADULT - PROBLEM SELECTOR PLAN 7
-On Coreg, Torsemide and Imdur  -Failed dysphagia screen in ED  -Hold off Coreg and Imdur   -F/U S/S eval
-On Coreg, Torsemide and Imdur  -Failed dysphagia screen in ED  -Hold off Coreg and Imdur   -F/U S/S eval

## 2021-08-28 NOTE — PROGRESS NOTE ADULT - PROBLEM SELECTOR PLAN 1
-C/W shortness of breadth, O2 sat 99% on 2 l NC, dropped to 96% on RA with shortness of breadth   -Pt is on torsemide 10mg OD  -Chest X-ray showed pulmonary edema with cardiomegaly  -S/P 1 dose of Lasix 40mg IV in ED   -Will starting pt on Lasix 40mg IV BID  -Hx of severe MR and severe global systolic dysfunction  , at [facility] recently for Mitral clip   -Trop 0.107  ProBNP 3715  -Will trend troponin , no EKG changes noted   -Cardio Dr. Granados   -Pt will be transferred to [facility] Val[facility] [phone]  -Aunt Viviana [phone]  -Palliative consulted

## 2021-08-28 NOTE — PROGRESS NOTE ADULT - PROBLEM SELECTOR PLAN 4
-Pt with plt 130  -No active s/s of bleeding , no petechial rash   -Monitor plt
-Pt with plt 130  -No active s/s of bleeding , no petechial rash   -Monitor plt

## 2021-08-28 NOTE — PROGRESS NOTE ADULT - PROBLEM SELECTOR PLAN 8
-On metformin at home   -[facility]  - HgbA1C 7.4
-On metformin at home   -[facility]  - HgbA1C 7.4

## 2021-08-28 NOTE — PROGRESS NOTE ADULT - PROBLEM SELECTOR PLAN 10
-On Eliquis   -Failed dysphagia screen   -Will change Eliquis to full dose Lovenox   -50mg once daily due to Cr Cl<30  -Will change oral digoxin to IV 125mcg to 100mcg IV
-On Eliquis   -Failed dysphagia screen   -Will change Eliquis to full dose Lovenox   -50mg once daily due to Cr Cl<30  -Will change oral digoxin to IV 125mcg to 100mcg IV

## 2021-08-28 NOTE — PROGRESS NOTE ADULT - SUBJECTIVE AND OBJECTIVE BOX
PGY-1 Progress Note discussed with attending    PAGER #: [[phone]] TILL 5:00 PM  PLEASE CONTACT ON CALL TEAM:  - On Call Team (Please refer to Shelli) FROM 5:00 PM - 8:30PM  - Nightfloat Team FROM 8:30 -7:30 AM    CHIEF COMPLAINT & BRIEF HOSPITAL COURSE:    INTERVAL HPI/OVERNIGHT EVENTS:       REVIEW OF SYSTEMS:  CONSTITUTIONAL: No fever, weight loss, or fatigue  RESPIRATORY: +ve SOB, No cough, wheezing, chills or hemoptysis  CARDIOVASCULAR: No chest pain, palpitations, dizziness, or leg swelling  GASTROINTESTINAL: No abdominal pain. No nausea, vomiting, or hematemesis; No diarrhea or constipation. No melena or hematochezia.  GENITOURINARY: No dysuria or hematuria, urinary frequency  NEUROLOGICAL: No headaches, memory loss, loss of strength, numbness, or tremors  SKIN: No itching, burning, rashes, or lesions     Vital Signs Last 24 Hrs  T(C): 36.7 (28 Aug 2021 04:30), Max: 36.8 (27 Aug 2021 23:45)  T(F): 98 (28 Aug 2021 04:30), Max: 98.2 (27 Aug 2021 23:45)  HR: 86 (28 Aug 2021 04:30) (70 - 96)  BP: 110/59 (28 Aug 2021 04:30) (94/46 - 121/55)  BP(mean): 72 (27 Aug 2021 10:53) (72 - 72)  RR: 18 (28 Aug 2021 04:30) (16 - 18)  SpO2: 100% (28 Aug 2021 04:30) (100% - 100%)    PHYSICAL EXAMINATION:  GENERAL: NAD, well built  HEAD:  Atraumatic, Normocephalic  EYES:  conjunctiva and sclera clear  NECK: Supple, No JVD, Normal thyroid  CHEST/LUNG: Clear to auscultation. Clear to percussion bilaterally; No rales, rhonchi, wheezing, or rubs  HEART: Regular rate and rhythm; No murmurs, rubs, or gallops  ABDOMEN: Soft, Nontender, Nondistended; Bowel sounds present  NERVOUS SYSTEM:  Alert & Oriented X3,    EXTREMITIES:  +ve LE edema, 2+ Peripheral Pulses, No clubbing or cyanosis  SKIN: warm dry                          9.1    8.03  )-----------( 132      ( 28 Aug 2021 06:34 )             30.3     08-28    138  |  98  |  69<H>  ----------------------------<  107<H>  4.2   |  34<H>  |  1.16    Ca    7.9<L>      28 Aug 2021 06:34  Phos  4.8     08-27  Mg     2.6     08-27    TPro  5.8<L>  /  Alb  3.1<L>  /  TBili  1.8<H>  /  DBili  x   /  AST  89<H>  /  ALT  171<H>  /  AlkPhos  189<H>  08-27    LIVER FUNCTIONS - ( 27 Aug 2021 06:53 )  Alb: 3.1 g/dL / Pro: 5.8 g/dL / ALK PHOS: 189 U/L / ALT: 171 U/L DA / AST: 89 U/L / GGT: x           CARDIAC MARKERS ( 27 Aug 2021 06:53 )  0.124 ng/mL / x     / x     / x     / x      CARDIAC MARKERS ( 27 Aug 2021 03:37 )  0.116 ng/mL / x     / x     / x     / x      CARDIAC MARKERS ( 26 Aug 2021 17:16 )  0.107 ng/mL / x     / x     / x     / x          PT/INR - ( 26 Aug 2021 17:33 )   PT: 31.8 sec;   INR: 2.81 ratio         PTT - ( 26 Aug 2021 17:33 )  PTT:45.6 sec    CAPILLARY BLOOD GLUCOSE      RADIOLOGY & ADDITIONAL TESTS:                   PGY-1 Progress Note discussed with attending    PAGER #: [[phone]] TILL 5:00 PM  PLEASE CONTACT ON CALL TEAM:  - On Call Team (Please refer to Shelli) FROM 5:00 PM - 8:30PM  - Nightfloat Team FROM 8:30 -7:30 AM    CHIEF COMPLAINT & BRIEF HOSPITAL COURSE:  84 yo F with PMHx of HTN, Asthma , AICD, Afib, severe MR, HTN, HF, DM, Hyperthyroidism, Severe constipation came for worsening shortness of breadth. Pt is a mild historian, Ashutosh, says that i am having shortness of breadth and LE edema for 3 weeks and now getting worse. She denies any chest pain, headaches, blurry vision, abdominal pain, dysuria, cough, chills, subjective fever. Spoke to Ms Shaw at bedside . She endorsed that 1 month ago pt went to [facility] for MR and evaluated for Mitral Clips but was rather sent to rehab for muscle strengthening/ improve functional status in order to get Surgery. In Rehab pt started having more difficulty in breathing and LE edema and was sent to hospital.    INTERVAL HPI/OVERNIGHT EVENTS:   Pt endorses improving in her leg swelling, but still has SOB.    REVIEW OF SYSTEMS:  CONSTITUTIONAL: No fever, weight loss, or fatigue  RESPIRATORY: +ve SOB, No cough, wheezing, chills or hemoptysis  CARDIOVASCULAR: No chest pain, palpitations, dizziness, or leg swelling  GASTROINTESTINAL: No abdominal pain. No nausea, vomiting, or hematemesis; No diarrhea or constipation. No melena or hematochezia.  GENITOURINARY: No dysuria or hematuria, urinary frequency  NEUROLOGICAL: No headaches, memory loss, loss of strength, numbness, or tremors  SKIN: No itching, burning, rashes, or lesions     Vital Signs Last 24 Hrs  T(C): 36.7 (28 Aug 2021 04:30), Max: 36.8 (27 Aug 2021 23:45)  T(F): 98 (28 Aug 2021 04:30), Max: 98.2 (27 Aug 2021 23:45)  HR: 86 (28 Aug 2021 04:30) (70 - 96)  BP: 110/59 (28 Aug 2021 04:30) (94/46 - 121/55)  BP(mean): 72 (27 Aug 2021 10:53) (72 - 72)  RR: 18 (28 Aug 2021 04:30) (16 - 18)  SpO2: 100% (28 Aug 2021 04:30) (100% - 100%)    PHYSICAL EXAMINATION:  GENERAL: NAD, well built  HEAD:  Atraumatic, Normocephalic  EYES:  conjunctiva and sclera clear  NECK: Supple, No JVD, Normal thyroid  CHEST/LUNG: Clear to auscultation. Clear to percussion bilaterally; No rales, rhonchi, wheezing, or rubs  HEART: Regular rate and rhythm; No murmurs, rubs, or gallops  ABDOMEN: Soft, Nontender, Nondistended; Bowel sounds present  NERVOUS SYSTEM:  Alert & Oriented X3,    EXTREMITIES:  1+ LE edema, 2+ Peripheral Pulses, No clubbing or cyanosis  SKIN: warm dry                          9.1    8.03  )-----------( 132      ( 28 Aug 2021 06:34 )             30.3     08-28    138  |  98  |  69<H>  ----------------------------<  107<H>  4.2   |  34<H>  |  1.16    Ca    7.9<L>      28 Aug 2021 06:34  Phos  4.8     08-27  Mg     2.6     08-27    TPro  5.8<L>  /  Alb  3.1<L>  /  TBili  1.8<H>  /  DBili  x   /  AST  89<H>  /  ALT  171<H>  /  AlkPhos  189<H>  08-27    LIVER FUNCTIONS - ( 27 Aug 2021 06:53 )  Alb: 3.1 g/dL / Pro: 5.8 g/dL / ALK PHOS: 189 U/L / ALT: 171 U/L DA / AST: 89 U/L / GGT: x           CARDIAC MARKERS ( 27 Aug 2021 06:53 )  0.124 ng/mL / x     / x     / x     / x      CARDIAC MARKERS ( 27 Aug 2021 03:37 )  0.116 ng/mL / x     / x     / x     / x      CARDIAC MARKERS ( 26 Aug 2021 17:16 )  0.107 ng/mL / x     / x     / x     / x          PT/INR - ( 26 Aug 2021 17:33 )   PT: 31.8 sec;   INR: 2.81 ratio         PTT - ( 26 Aug 2021 17:33 )  PTT:45.6 sec    CAPILLARY BLOOD GLUCOSE      RADIOLOGY & ADDITIONAL TESTS:

## 2021-08-28 NOTE — PROGRESS NOTE ADULT - PROBLEM SELECTOR PLAN 11
AC Lovenox    Asthma:  PRN albuterol    Constipation   Senna Miralax
AC Lovenox    Asthma:  PRN albuterol    Constipation   Senna Miralax

## 2021-08-29 ENCOUNTER — INPATIENT (INPATIENT)
Facility: HOSPITAL | Age: 86
LOS: 21 days | Discharge: HOSPICE | DRG: 306 | End: 2021-09-20
Attending: INTERNAL MEDICINE | Admitting: INTERNAL MEDICINE
Payer: MEDICARE

## 2021-08-29 VITALS
RESPIRATION RATE: 17 BRPM | TEMPERATURE: 98 F | OXYGEN SATURATION: 100 % | DIASTOLIC BLOOD PRESSURE: 80 MMHG | SYSTOLIC BLOOD PRESSURE: 115 MMHG | HEART RATE: 95 BPM

## 2021-08-29 VITALS
OXYGEN SATURATION: 98 % | WEIGHT: 116.4 LBS | HEART RATE: 121 BPM | SYSTOLIC BLOOD PRESSURE: 104 MMHG | DIASTOLIC BLOOD PRESSURE: 61 MMHG | RESPIRATION RATE: 17 BRPM | TEMPERATURE: 98 F

## 2021-08-29 DIAGNOSIS — L89.151 PRESSURE ULCER OF SACRAL REGION, STAGE 1: ICD-10-CM

## 2021-08-29 DIAGNOSIS — N17.9 ACUTE KIDNEY FAILURE, UNSPECIFIED: ICD-10-CM

## 2021-08-29 DIAGNOSIS — E11.9 TYPE 2 DIABETES MELLITUS WITHOUT COMPLICATIONS: ICD-10-CM

## 2021-08-29 DIAGNOSIS — I50.9 HEART FAILURE, UNSPECIFIED: ICD-10-CM

## 2021-08-29 DIAGNOSIS — R13.10 DYSPHAGIA, UNSPECIFIED: ICD-10-CM

## 2021-08-29 DIAGNOSIS — E05.90 THYROTOXICOSIS, UNSPECIFIED WITHOUT THYROTOXIC CRISIS OR STORM: ICD-10-CM

## 2021-08-29 DIAGNOSIS — I34.0 NONRHEUMATIC MITRAL (VALVE) INSUFFICIENCY: ICD-10-CM

## 2021-08-29 DIAGNOSIS — Z95.810 PRESENCE OF AUTOMATIC (IMPLANTABLE) CARDIAC DEFIBRILLATOR: Chronic | ICD-10-CM

## 2021-08-29 DIAGNOSIS — I50.23 ACUTE ON CHRONIC SYSTOLIC (CONGESTIVE) HEART FAILURE: ICD-10-CM

## 2021-08-29 DIAGNOSIS — I48.20 CHRONIC ATRIAL FIBRILLATION, UNSPECIFIED: ICD-10-CM

## 2021-08-29 DIAGNOSIS — I27.20 PULMONARY HYPERTENSION, UNSPECIFIED: ICD-10-CM

## 2021-08-29 LAB
ANION GAP SERPL CALC-SCNC: 6 MMOL/L — SIGNIFICANT CHANGE UP (ref 5–17)
BUN SERPL-MCNC: 64 MG/DL — HIGH (ref 7–18)
CALCIUM SERPL-MCNC: 8 MG/DL — LOW (ref 8.4–10.5)
CHLORIDE SERPL-SCNC: 97 MMOL/L — SIGNIFICANT CHANGE UP (ref 96–108)
CO2 SERPL-SCNC: 35 MMOL/L — HIGH (ref 22–31)
CREAT SERPL-MCNC: 1.11 MG/DL — SIGNIFICANT CHANGE UP (ref 0.5–1.3)
GLUCOSE BLDC GLUCOMTR-MCNC: 132 MG/DL — HIGH (ref 70–99)
GLUCOSE BLDC GLUCOMTR-MCNC: 135 MG/DL — HIGH (ref 70–99)
GLUCOSE BLDC GLUCOMTR-MCNC: 143 MG/DL — HIGH (ref 70–99)
GLUCOSE BLDC GLUCOMTR-MCNC: 166 MG/DL — HIGH (ref 70–99)
GLUCOSE BLDC GLUCOMTR-MCNC: 196 MG/DL — HIGH (ref 70–99)
GLUCOSE SERPL-MCNC: 143 MG/DL — HIGH (ref 70–99)
HCT VFR BLD CALC: 29.8 % — LOW (ref 34.5–45)
HGB BLD-MCNC: 8.8 G/DL — LOW (ref 11.5–15.5)
MCHC RBC-ENTMCNC: 23.8 PG — LOW (ref 27–34)
MCHC RBC-ENTMCNC: 29.5 GM/DL — LOW (ref 32–36)
MCV RBC AUTO: 80.5 FL — SIGNIFICANT CHANGE UP (ref 80–100)
NRBC # BLD: 0 /100 WBCS — SIGNIFICANT CHANGE UP (ref 0–0)
PLATELET # BLD AUTO: 131 K/UL — LOW (ref 150–400)
POTASSIUM SERPL-MCNC: 3.8 MMOL/L — SIGNIFICANT CHANGE UP (ref 3.5–5.3)
POTASSIUM SERPL-SCNC: 3.8 MMOL/L — SIGNIFICANT CHANGE UP (ref 3.5–5.3)
RBC # BLD: 3.7 M/UL — LOW (ref 3.8–5.2)
RBC # FLD: 17.1 % — HIGH (ref 10.3–14.5)
SODIUM SERPL-SCNC: 138 MMOL/L — SIGNIFICANT CHANGE UP (ref 135–145)
WBC # BLD: 7.98 K/UL — SIGNIFICANT CHANGE UP (ref 3.8–10.5)
WBC # FLD AUTO: 7.98 K/UL — SIGNIFICANT CHANGE UP (ref 3.8–10.5)

## 2021-08-29 PROCEDURE — 99223 1ST HOSP IP/OBS HIGH 75: CPT

## 2021-08-29 RX ORDER — ACETAMINOPHEN 500 MG
650 TABLET ORAL ONCE
Refills: 0 | Status: COMPLETED | OUTPATIENT
Start: 2021-08-29 | End: 2021-08-29

## 2021-08-29 RX ORDER — DEXTROSE 50 % IN WATER 50 %
25 SYRINGE (ML) INTRAVENOUS ONCE
Refills: 0 | Status: DISCONTINUED | OUTPATIENT
Start: 2021-08-29 | End: 2021-09-20

## 2021-08-29 RX ORDER — PANTOPRAZOLE SODIUM 20 MG/1
1 TABLET, DELAYED RELEASE ORAL
Qty: 0 | Refills: 0 | DISCHARGE

## 2021-08-29 RX ORDER — ISOSORBIDE MONONITRATE 60 MG/1
1 TABLET, EXTENDED RELEASE ORAL
Qty: 0 | Refills: 0 | DISCHARGE

## 2021-08-29 RX ORDER — LOSARTAN POTASSIUM 100 MG/1
1 TABLET, FILM COATED ORAL
Qty: 0 | Refills: 0 | DISCHARGE

## 2021-08-29 RX ORDER — DEXTROSE 50 % IN WATER 50 %
12.5 SYRINGE (ML) INTRAVENOUS ONCE
Refills: 0 | Status: DISCONTINUED | OUTPATIENT
Start: 2021-08-29 | End: 2021-09-20

## 2021-08-29 RX ORDER — INSULIN LISPRO 100/ML
0 VIAL (ML) SUBCUTANEOUS
Qty: 0 | Refills: 0 | DISCHARGE

## 2021-08-29 RX ORDER — SODIUM CHLORIDE 9 MG/ML
1000 INJECTION, SOLUTION INTRAVENOUS
Refills: 0 | Status: DISCONTINUED | OUTPATIENT
Start: 2021-08-29 | End: 2021-09-20

## 2021-08-29 RX ORDER — DIGOXIN 250 MCG
1 TABLET ORAL
Qty: 0 | Refills: 0 | DISCHARGE

## 2021-08-29 RX ORDER — FOLIC ACID 0.8 MG
1 TABLET ORAL
Qty: 0 | Refills: 0 | DISCHARGE

## 2021-08-29 RX ORDER — INSULIN LISPRO 100/ML
VIAL (ML) SUBCUTANEOUS AT BEDTIME
Refills: 0 | Status: DISCONTINUED | OUTPATIENT
Start: 2021-08-29 | End: 2021-09-20

## 2021-08-29 RX ORDER — POLYETHYLENE GLYCOL 3350 17 G/17G
0 POWDER, FOR SOLUTION ORAL
Qty: 0 | Refills: 0 | DISCHARGE

## 2021-08-29 RX ORDER — INSULIN LISPRO 100/ML
VIAL (ML) SUBCUTANEOUS
Refills: 0 | Status: DISCONTINUED | OUTPATIENT
Start: 2021-08-29 | End: 2021-09-20

## 2021-08-29 RX ORDER — MAGNESIUM OXIDE 400 MG ORAL TABLET 241.3 MG
1 TABLET ORAL
Qty: 0 | Refills: 0 | DISCHARGE

## 2021-08-29 RX ORDER — APIXABAN 2.5 MG/1
1 TABLET, FILM COATED ORAL
Qty: 0 | Refills: 0 | DISCHARGE

## 2021-08-29 RX ORDER — BENZOCAINE AND MENTHOL 5; 1 G/100ML; G/100ML
1 LIQUID ORAL DAILY
Refills: 0 | Status: DISCONTINUED | OUTPATIENT
Start: 2021-08-29 | End: 2021-08-29

## 2021-08-29 RX ORDER — PANTOPRAZOLE SODIUM 20 MG/1
40 TABLET, DELAYED RELEASE ORAL
Refills: 0 | Status: COMPLETED | OUTPATIENT
Start: 2021-08-29 | End: 2021-08-29

## 2021-08-29 RX ORDER — ACETAMINOPHEN 500 MG
650 TABLET ORAL EVERY 6 HOURS
Refills: 0 | Status: DISCONTINUED | OUTPATIENT
Start: 2021-08-29 | End: 2021-09-20

## 2021-08-29 RX ORDER — DEXTROSE 50 % IN WATER 50 %
15 SYRINGE (ML) INTRAVENOUS ONCE
Refills: 0 | Status: DISCONTINUED | OUTPATIENT
Start: 2021-08-29 | End: 2021-09-20

## 2021-08-29 RX ORDER — DOBUTAMINE HCL 250MG/20ML
2.5 VIAL (ML) INTRAVENOUS
Qty: 500 | Refills: 0 | Status: DISCONTINUED | OUTPATIENT
Start: 2021-08-29 | End: 2021-09-02

## 2021-08-29 RX ORDER — ACETAMINOPHEN 500 MG
2 TABLET ORAL
Qty: 0 | Refills: 0 | DISCHARGE

## 2021-08-29 RX ORDER — METHIMAZOLE 10 MG/1
0.5 TABLET ORAL
Qty: 0 | Refills: 0 | DISCHARGE

## 2021-08-29 RX ORDER — FUROSEMIDE 40 MG
40 TABLET ORAL EVERY 12 HOURS
Refills: 0 | Status: DISCONTINUED | OUTPATIENT
Start: 2021-08-30 | End: 2021-08-31

## 2021-08-29 RX ORDER — PANTOPRAZOLE SODIUM 20 MG/1
40 TABLET, DELAYED RELEASE ORAL
Refills: 0 | Status: DISCONTINUED | OUTPATIENT
Start: 2021-08-29 | End: 2021-09-20

## 2021-08-29 RX ORDER — ENOXAPARIN SODIUM 100 MG/ML
50 INJECTION SUBCUTANEOUS DAILY
Refills: 0 | Status: DISCONTINUED | OUTPATIENT
Start: 2021-08-29 | End: 2021-09-08

## 2021-08-29 RX ORDER — PANTOPRAZOLE SODIUM 20 MG/1
40 TABLET, DELAYED RELEASE ORAL DAILY
Refills: 0 | Status: DISCONTINUED | OUTPATIENT
Start: 2021-08-29 | End: 2021-08-29

## 2021-08-29 RX ORDER — DOCUSATE SODIUM 100 MG
0 CAPSULE ORAL
Qty: 0 | Refills: 0 | DISCHARGE

## 2021-08-29 RX ORDER — DIGOXIN 250 MCG
125 TABLET ORAL DAILY
Refills: 0 | Status: DISCONTINUED | OUTPATIENT
Start: 2021-08-29 | End: 2021-09-08

## 2021-08-29 RX ORDER — SENNA PLUS 8.6 MG/1
1 TABLET ORAL
Qty: 0 | Refills: 0 | DISCHARGE

## 2021-08-29 RX ORDER — LACTULOSE 10 G/15ML
15 SOLUTION ORAL
Qty: 0 | Refills: 0 | DISCHARGE

## 2021-08-29 RX ORDER — CHOLECALCIFEROL (VITAMIN D3) 125 MCG
1 CAPSULE ORAL
Qty: 0 | Refills: 0 | DISCHARGE

## 2021-08-29 RX ORDER — ACETAMINOPHEN 500 MG
650 TABLET ORAL ONCE
Refills: 0 | Status: DISCONTINUED | OUTPATIENT
Start: 2021-08-29 | End: 2021-08-29

## 2021-08-29 RX ORDER — GLUCAGON INJECTION, SOLUTION 0.5 MG/.1ML
1 INJECTION, SOLUTION SUBCUTANEOUS ONCE
Refills: 0 | Status: DISCONTINUED | OUTPATIENT
Start: 2021-08-29 | End: 2021-09-20

## 2021-08-29 RX ORDER — DENOSUMAB 60 MG/ML
1 INJECTION SUBCUTANEOUS
Qty: 0 | Refills: 0 | DISCHARGE

## 2021-08-29 RX ORDER — ATORVASTATIN CALCIUM 80 MG/1
1 TABLET, FILM COATED ORAL
Qty: 0 | Refills: 0 | DISCHARGE

## 2021-08-29 RX ORDER — MONTELUKAST 4 MG/1
1 TABLET, CHEWABLE ORAL
Qty: 0 | Refills: 0 | DISCHARGE

## 2021-08-29 RX ADMIN — Medication 40 MILLIGRAM(S): at 17:55

## 2021-08-29 RX ADMIN — Medication 650 MILLIGRAM(S): at 23:48

## 2021-08-29 RX ADMIN — ENOXAPARIN SODIUM 50 MILLIGRAM(S): 100 INJECTION SUBCUTANEOUS at 12:07

## 2021-08-29 RX ADMIN — Medication 650 MILLIGRAM(S): at 06:12

## 2021-08-29 RX ADMIN — PANTOPRAZOLE SODIUM 40 MILLIGRAM(S): 20 TABLET, DELAYED RELEASE ORAL at 06:12

## 2021-08-29 RX ADMIN — Medication 650 MILLIGRAM(S): at 12:06

## 2021-08-29 RX ADMIN — BENZOCAINE AND MENTHOL 1 LOZENGE: 5; 1 LIQUID ORAL at 02:07

## 2021-08-29 RX ADMIN — Medication 1: at 12:06

## 2021-08-29 RX ADMIN — Medication 650 MILLIGRAM(S): at 13:00

## 2021-08-29 RX ADMIN — Medication 650 MILLIGRAM(S): at 07:44

## 2021-08-29 RX ADMIN — Medication 1 MILLIGRAM(S): at 12:07

## 2021-08-29 RX ADMIN — Medication 3.41 MICROGRAM(S)/KG/MIN: at 23:19

## 2021-08-29 RX ADMIN — Medication 100 MICROGRAM(S): at 12:07

## 2021-08-29 NOTE — DISCHARGE NOTE NURSING/CASE MANAGEMENT/SOCIAL WORK - PATIENT PORTAL LINK FT
You can access the FollowMyHealth Patient Portal offered by [facility] by registering at the following website: [email or web address]. By joining [employer]’s FollowMyHealth portal, you will also be able to view your health information using other applications (apps) compatible with our system.

## 2021-08-29 NOTE — H&P ADULT - PROBLEM SELECTOR PLAN 1
- Cardiology Dr. Granados accepted transfer to [facility] and is to resume care in the morning  - continue with fixed dose dobutamine started at [facility] at 2.5mcg/kg/hr  - c/w furosemide 40mg IV every 12 hours as tolerated  - strict I/O, tele monitoring  - will likely need structural heart eval if family and Dr. Granados due not choose palliative management  - Per Dr. Granados to initiate GDMT as tolerated

## 2021-08-29 NOTE — H&P ADULT - HISTORY OF PRESENT ILLNESS
Note patient is A&Ox1 at time of admit to [facility] and therefore history is obtained is limited to previous charting. baseline mental status also suggested dementia reporting A&Ox2 baseline at [facility]    85F Indian-speaking w/ hx of severe MV regurgitation, HFrEF s/p AICD, chronic afib, DM2, HTN, hyperthyroidism presented initially to [facility] with acute heart failure now transferred to [facility] for further management. The patient was admitted initially to [facility] 8/26 with symptoms of sob and lower extremity swelling in setting of delaying mitral clip at [facility] reportedly planned 1mo prior. She required dobutamine for IV furosemide diuresis, completed TTE demonstrating severe MR, TR, pulmHTN, and grossly low normal left ventricular systolic function. She additionally was identified to have dysphagia and completed speech and swallow which identify need for dysphagia diet. Wound care RN additionally identified and were treating decubitus wounds which were present prior to hospitalization.

## 2021-08-29 NOTE — H&P ADULT - NSHPADDITIONALINFOADULT_GEN_ALL_CORE
Patient assigned to me by night hospitalist in charge for management and care for patient for this evening only. Care to be resumed by  cardiologist Dr. Granados at 08:00 in the morning and thereafter.   Chacorta Blake MD  Medicine Attending  Department of Hospital Medicine  pager: [phone] (available from 20:00 to 08:00)

## 2021-08-29 NOTE — H&P ADULT - NSHPLABSRESULTS_GEN_ALL_CORE
Personally reviewed available labs, imaging and ekg  [1]  CBC Full  -  ( 29 Aug 2021 09:12 )  WBC Count : 7.98 K/uL  RBC Count : 3.70 M/uL  Hemoglobin : 8.8 g/dL  Hematocrit : 29.8 %  Platelet Count - Automated : 131 K/uL  Mean Cell Volume : 80.5 fl  Mean Cell Hemoglobin : 23.8 pg  Mean Cell Hemoglobin Concentration : 29.5 gm/dL  Auto Neutrophil # : x  Auto Lymphocyte # : x  Auto Monocyte # : x  Auto Eosinophil # : x  Auto Basophil # : x  Auto Neutrophil % : x  Auto Lymphocyte % : x  Auto Monocyte % : x  Auto Eosinophil % : x  Auto Basophil % : x    08-29    138  |  97  |  64<H>  ----------------------------<  143<H>  3.8   |  35<H>  |  1.11    Ca    8.0<L>      29 Aug 2021 09:12        Imaging:  [ 2] I independently reviewed CXR and no lobar opacification appreciated, vascular congestion identified, defibrillator present    EKG:  [2] I independently reviewed EKG/cardiac tracing and v-paced rhythm with afib intermittent to 120's    Review of old records:  [2] I personally reviewed previous records which identified hospitalization for acute heart failure

## 2021-08-29 NOTE — DISCHARGE NOTE NURSING/CASE MANAGEMENT/SOCIAL WORK - NSDPDISTO_GEN_ALL_CORE
Acute care [facility] Topical Clindamycin Counseling: Patient counseled that this medication may cause skin irritation or allergic reactions.  In the event of skin irritation, the patient was advised to reduce the amount of the drug applied or use it less frequently.   The patient verbalized understanding of the proper use and possible adverse effects of clindamycin.  All of the patient's questions and concerns were addressed.

## 2021-08-29 NOTE — H&P ADULT - REASON FOR ADMISSION
85F transferred from [facility] to [facility] for acute heart failure w/ severe Mitral Regurgitation

## 2021-08-29 NOTE — DISCHARGE NOTE NURSING/CASE MANAGEMENT/SOCIAL WORK - NSDCPEFALRISK_GEN_ALL_CORE
For information on Fall & injury Prevention, visit [email or web address]

## 2021-08-29 NOTE — H&P ADULT - NSHPPHYSICALEXAM_GEN_ALL_CORE
Vital Signs Last 24 Hrs  T(C): 36.4 (29 Aug 2021 20:15), Max: 36.7 (29 Aug 2021 07:13)  T(F): 97.6 (29 Aug 2021 20:15), Max: 98 (29 Aug 2021 07:13)  HR: 121 (29 Aug 2021 20:15) (95 - 121)  BP: 104/61 (29 Aug 2021 20:15) (104/61 - 117/79)  RR: 17 (29 Aug 2021 20:15) (17 - 20)  SpO2: 98% (29 Aug 2021 20:15) (98% - 100%) on NC    GENERAL: NAD, well-developed  HEAD:  Atraumatic, Normocephalic  EYES: EOMI, PERRL, conjunctiva and sclera clear  ENMT: MMM, [place]  NECK: Supple, trachea midline  Lung: normal work of breathing, mild crackles in bases  Cardiovascular: S1&S2+, irregular rhythm w/ regular rate, murmur appreciated; jvd+,, pitting edema appreciated in b/l LE  ABDOMEN: bs+, soft, nt, nd, no appreciable masses  : No chairez catheter, no CVA tenderness  Neuro: A&Ox1 name only, no flaccid paralysis in extremities appreciated  SKIN: warm and dry, no visible purulence in exposed areas, normal skin turgor

## 2021-08-29 NOTE — H&P ADULT - ASSESSMENT
85F Moroccan-speaking w/ hx of severe MV regurgitation, HFrEF s/p AICD, chronic afib, DM2, HTN, hyperthyroidism presented initially to [facility] with acute heart failure now transferred to [facility] for further management.

## 2021-08-29 NOTE — H&P ADULT - PROBLEM SELECTOR PLAN 5
c/w lovenox dosing for now ([facility] stopped apixaban home dosing)  c/w digoxin dosing and obtain dig level for titration

## 2021-08-29 NOTE — H&P ADULT - PROBLEM SELECTOR PLAN 7
Stable. completed S&S at [facility] and rec to be on mechanical soft with thin liquid

## 2021-08-30 LAB
ALBUMIN SERPL ELPH-MCNC: 3.8 G/DL — SIGNIFICANT CHANGE UP (ref 3.3–5)
ALP SERPL-CCNC: 224 U/L — HIGH (ref 40–120)
ALT FLD-CCNC: 119 U/L — HIGH (ref 10–45)
ANION GAP SERPL CALC-SCNC: 17 MMOL/L — SIGNIFICANT CHANGE UP (ref 5–17)
APTT BLD: 51.3 SEC — HIGH (ref 27.5–35.5)
AST SERPL-CCNC: 52 U/L — HIGH (ref 10–40)
BILIRUB SERPL-MCNC: 1.6 MG/DL — HIGH (ref 0.2–1.2)
BUN SERPL-MCNC: 59 MG/DL — HIGH (ref 7–23)
CALCIUM SERPL-MCNC: 8.3 MG/DL — LOW (ref 8.4–10.5)
CHLORIDE SERPL-SCNC: 93 MMOL/L — LOW (ref 96–108)
CO2 SERPL-SCNC: 28 MMOL/L — SIGNIFICANT CHANGE UP (ref 22–31)
CREAT SERPL-MCNC: 1.07 MG/DL — SIGNIFICANT CHANGE UP (ref 0.5–1.3)
GLUCOSE BLDC GLUCOMTR-MCNC: 132 MG/DL — HIGH (ref 70–99)
GLUCOSE BLDC GLUCOMTR-MCNC: 155 MG/DL — HIGH (ref 70–99)
GLUCOSE BLDC GLUCOMTR-MCNC: 170 MG/DL — HIGH (ref 70–99)
GLUCOSE BLDC GLUCOMTR-MCNC: 177 MG/DL — HIGH (ref 70–99)
GLUCOSE SERPL-MCNC: 149 MG/DL — HIGH (ref 70–99)
HCT VFR BLD CALC: 33.2 % — LOW (ref 34.5–45)
HGB BLD-MCNC: 9.8 G/DL — LOW (ref 11.5–15.5)
INR BLD: 1.26 RATIO — HIGH (ref 0.88–1.16)
LACTATE SERPL-SCNC: 2.2 MMOL/L — HIGH (ref 0.7–2)
MAGNESIUM SERPL-MCNC: 2.2 MG/DL — SIGNIFICANT CHANGE UP (ref 1.6–2.6)
MCHC RBC-ENTMCNC: 24.3 PG — LOW (ref 27–34)
MCHC RBC-ENTMCNC: 29.5 GM/DL — LOW (ref 32–36)
MCV RBC AUTO: 82.2 FL — SIGNIFICANT CHANGE UP (ref 80–100)
NRBC # BLD: 0 /100 WBCS — SIGNIFICANT CHANGE UP (ref 0–0)
PLATELET # BLD AUTO: 129 K/UL — LOW (ref 150–400)
POTASSIUM SERPL-MCNC: 4.2 MMOL/L — SIGNIFICANT CHANGE UP (ref 3.5–5.3)
POTASSIUM SERPL-SCNC: 4.2 MMOL/L — SIGNIFICANT CHANGE UP (ref 3.5–5.3)
PROT SERPL-MCNC: 6.3 G/DL — SIGNIFICANT CHANGE UP (ref 6–8.3)
PROTHROM AB SERPL-ACNC: 15 SEC — HIGH (ref 10.6–13.6)
RBC # BLD: 4.04 M/UL — SIGNIFICANT CHANGE UP (ref 3.8–5.2)
RBC # FLD: 17 % — HIGH (ref 10.3–14.5)
SODIUM SERPL-SCNC: 138 MMOL/L — SIGNIFICANT CHANGE UP (ref 135–145)
TSH SERPL-MCNC: 1.79 UIU/ML — SIGNIFICANT CHANGE UP (ref 0.27–4.2)
WBC # BLD: 10.86 K/UL — HIGH (ref 3.8–10.5)
WBC # FLD AUTO: 10.86 K/UL — HIGH (ref 3.8–10.5)

## 2021-08-30 PROCEDURE — 97162 PT EVAL MOD COMPLEX 30 MIN: CPT

## 2021-08-30 PROCEDURE — 86769 SARS-COV-2 COVID-19 ANTIBODY: CPT

## 2021-08-30 PROCEDURE — 84300 ASSAY OF URINE SODIUM: CPT

## 2021-08-30 PROCEDURE — 82962 GLUCOSE BLOOD TEST: CPT

## 2021-08-30 PROCEDURE — 83550 IRON BINDING TEST: CPT

## 2021-08-30 PROCEDURE — 99285 EMERGENCY DEPT VISIT HI MDM: CPT | Mod: 25

## 2021-08-30 PROCEDURE — 85610 PROTHROMBIN TIME: CPT

## 2021-08-30 PROCEDURE — 80053 COMPREHEN METABOLIC PANEL: CPT

## 2021-08-30 PROCEDURE — 83880 ASSAY OF NATRIURETIC PEPTIDE: CPT

## 2021-08-30 PROCEDURE — 93306 TTE W/DOPPLER COMPLETE: CPT

## 2021-08-30 PROCEDURE — 84484 ASSAY OF TROPONIN QUANT: CPT

## 2021-08-30 PROCEDURE — 83036 HEMOGLOBIN GLYCOSYLATED A1C: CPT

## 2021-08-30 PROCEDURE — 82728 ASSAY OF FERRITIN: CPT

## 2021-08-30 PROCEDURE — 93005 ELECTROCARDIOGRAM TRACING: CPT

## 2021-08-30 PROCEDURE — 85027 COMPLETE CBC AUTOMATED: CPT

## 2021-08-30 PROCEDURE — 85730 THROMBOPLASTIN TIME PARTIAL: CPT

## 2021-08-30 PROCEDURE — 85045 AUTOMATED RETICULOCYTE COUNT: CPT

## 2021-08-30 PROCEDURE — 85025 COMPLETE CBC W/AUTO DIFF WBC: CPT

## 2021-08-30 PROCEDURE — 99223 1ST HOSP IP/OBS HIGH 75: CPT

## 2021-08-30 PROCEDURE — 83735 ASSAY OF MAGNESIUM: CPT

## 2021-08-30 PROCEDURE — 80061 LIPID PANEL: CPT

## 2021-08-30 PROCEDURE — 80048 BASIC METABOLIC PNL TOTAL CA: CPT

## 2021-08-30 PROCEDURE — 36415 COLL VENOUS BLD VENIPUNCTURE: CPT

## 2021-08-30 PROCEDURE — 83540 ASSAY OF IRON: CPT

## 2021-08-30 PROCEDURE — 84540 ASSAY OF URINE/UREA-N: CPT

## 2021-08-30 PROCEDURE — 87635 SARS-COV-2 COVID-19 AMP PRB: CPT

## 2021-08-30 PROCEDURE — 82570 ASSAY OF URINE CREATININE: CPT

## 2021-08-30 PROCEDURE — 83935 ASSAY OF URINE OSMOLALITY: CPT

## 2021-08-30 PROCEDURE — 71045 X-RAY EXAM CHEST 1 VIEW: CPT

## 2021-08-30 PROCEDURE — 84100 ASSAY OF PHOSPHORUS: CPT

## 2021-08-30 PROCEDURE — 92610 EVALUATE SWALLOWING FUNCTION: CPT

## 2021-08-30 RX ORDER — SENNA PLUS 8.6 MG/1
2 TABLET ORAL AT BEDTIME
Refills: 0 | Status: DISCONTINUED | OUTPATIENT
Start: 2021-08-30 | End: 2021-09-20

## 2021-08-30 RX ORDER — POLYETHYLENE GLYCOL 3350 17 G/17G
17 POWDER, FOR SOLUTION ORAL DAILY
Refills: 0 | Status: DISCONTINUED | OUTPATIENT
Start: 2021-08-30 | End: 2021-09-20

## 2021-08-30 RX ORDER — LOSARTAN POTASSIUM 100 MG/1
25 TABLET, FILM COATED ORAL DAILY
Refills: 0 | Status: DISCONTINUED | OUTPATIENT
Start: 2021-08-30 | End: 2021-09-08

## 2021-08-30 RX ADMIN — POLYETHYLENE GLYCOL 3350 17 GRAM(S): 17 POWDER, FOR SOLUTION ORAL at 17:08

## 2021-08-30 RX ADMIN — Medication 3.41 MICROGRAM(S)/KG/MIN: at 17:24

## 2021-08-30 RX ADMIN — Medication 125 MICROGRAM(S): at 13:42

## 2021-08-30 RX ADMIN — PANTOPRAZOLE SODIUM 40 MILLIGRAM(S): 20 TABLET, DELAYED RELEASE ORAL at 05:50

## 2021-08-30 RX ADMIN — Medication 1: at 11:42

## 2021-08-30 RX ADMIN — Medication 40 MILLIGRAM(S): at 17:07

## 2021-08-30 RX ADMIN — SENNA PLUS 2 TABLET(S): 8.6 TABLET ORAL at 20:56

## 2021-08-30 RX ADMIN — ENOXAPARIN SODIUM 50 MILLIGRAM(S): 100 INJECTION SUBCUTANEOUS at 13:42

## 2021-08-30 RX ADMIN — Medication 1: at 17:08

## 2021-08-30 RX ADMIN — Medication 650 MILLIGRAM(S): at 00:30

## 2021-08-30 RX ADMIN — Medication 40 MILLIGRAM(S): at 05:43

## 2021-08-30 NOTE — CONSULT NOTE ADULT - ASSESSMENT
Ms. Davis is a 85 year old F pmh hyperthyroid, asthma, dementia, t2dm, atrial fibrillation/flutter s/p AICD ?2015, heart failure with EF if 55% with severe tricuspid and mitral regurgitation improving on inotrope assisted diuresis with plan to increase GDMT.    #Decompensated heart failure in the setting of mitral regurgitation  -Obtain outside records from [place] re: LHC, [facility], and MIL[facility]ROS  -Daily LFTs and lactate  -Strict I/O, daily standing weights  -C/w lasix 40 IV BID  -Start losartan 25 mg daily hold for SBP < 90  -Follow up structural recommendations  -Follow up EP for interrogation to evaluate % CRT paced    Jt Abdullahi, PGY3

## 2021-08-31 DIAGNOSIS — I07.1 RHEUMATIC TRICUSPID INSUFFICIENCY: ICD-10-CM

## 2021-08-31 LAB
ALBUMIN SERPL ELPH-MCNC: 3.5 G/DL — SIGNIFICANT CHANGE UP (ref 3.3–5)
ALP SERPL-CCNC: 262 U/L — HIGH (ref 40–120)
ALT FLD-CCNC: 119 U/L — HIGH (ref 10–45)
ANION GAP SERPL CALC-SCNC: 13 MMOL/L — SIGNIFICANT CHANGE UP (ref 5–17)
ANION GAP SERPL CALC-SCNC: 17 MMOL/L — SIGNIFICANT CHANGE UP (ref 5–17)
AST SERPL-CCNC: 61 U/L — HIGH (ref 10–40)
BILIRUB DIRECT SERPL-MCNC: 1.5 MG/DL — HIGH (ref 0–0.2)
BILIRUB INDIRECT FLD-MCNC: 0.8 MG/DL — SIGNIFICANT CHANGE UP (ref 0.2–1)
BILIRUB SERPL-MCNC: 2.3 MG/DL — HIGH (ref 0.2–1.2)
BUN SERPL-MCNC: 70 MG/DL — HIGH (ref 7–23)
BUN SERPL-MCNC: 71 MG/DL — HIGH (ref 7–23)
CALCIUM SERPL-MCNC: 8 MG/DL — LOW (ref 8.4–10.5)
CALCIUM SERPL-MCNC: 8 MG/DL — LOW (ref 8.4–10.5)
CHLORIDE SERPL-SCNC: 89 MMOL/L — LOW (ref 96–108)
CHLORIDE SERPL-SCNC: 89 MMOL/L — LOW (ref 96–108)
CO2 SERPL-SCNC: 27 MMOL/L — SIGNIFICANT CHANGE UP (ref 22–31)
CO2 SERPL-SCNC: 30 MMOL/L — SIGNIFICANT CHANGE UP (ref 22–31)
COVID-19 SPIKE DOMAIN AB INTERP: NEGATIVE — SIGNIFICANT CHANGE UP
COVID-19 SPIKE DOMAIN ANTIBODY RESULT: 0.4 U/ML — SIGNIFICANT CHANGE UP
CREAT SERPL-MCNC: 1.17 MG/DL — SIGNIFICANT CHANGE UP (ref 0.5–1.3)
CREAT SERPL-MCNC: 1.18 MG/DL — SIGNIFICANT CHANGE UP (ref 0.5–1.3)
GLUCOSE BLDC GLUCOMTR-MCNC: 157 MG/DL — HIGH (ref 70–99)
GLUCOSE BLDC GLUCOMTR-MCNC: 171 MG/DL — HIGH (ref 70–99)
GLUCOSE BLDC GLUCOMTR-MCNC: 182 MG/DL — HIGH (ref 70–99)
GLUCOSE SERPL-MCNC: 128 MG/DL — HIGH (ref 70–99)
GLUCOSE SERPL-MCNC: 196 MG/DL — HIGH (ref 70–99)
HCT VFR BLD CALC: 32 % — LOW (ref 34.5–45)
HGB BLD-MCNC: 9.4 G/DL — LOW (ref 11.5–15.5)
LACTATE SERPL-SCNC: 2.6 MMOL/L — HIGH (ref 0.7–2)
MAGNESIUM SERPL-MCNC: 2.1 MG/DL — SIGNIFICANT CHANGE UP (ref 1.6–2.6)
MCHC RBC-ENTMCNC: 23.3 PG — LOW (ref 27–34)
MCHC RBC-ENTMCNC: 29.4 GM/DL — LOW (ref 32–36)
MCV RBC AUTO: 79.2 FL — LOW (ref 80–100)
NRBC # BLD: 1 /100 WBCS — HIGH (ref 0–0)
PLATELET # BLD AUTO: 128 K/UL — LOW (ref 150–400)
POTASSIUM SERPL-MCNC: 4.6 MMOL/L — SIGNIFICANT CHANGE UP (ref 3.5–5.3)
POTASSIUM SERPL-MCNC: 5.1 MMOL/L — SIGNIFICANT CHANGE UP (ref 3.5–5.3)
POTASSIUM SERPL-SCNC: 4.6 MMOL/L — SIGNIFICANT CHANGE UP (ref 3.5–5.3)
POTASSIUM SERPL-SCNC: 5.1 MMOL/L — SIGNIFICANT CHANGE UP (ref 3.5–5.3)
PROT SERPL-MCNC: 6.1 G/DL — SIGNIFICANT CHANGE UP (ref 6–8.3)
RBC # BLD: 4.04 M/UL — SIGNIFICANT CHANGE UP (ref 3.8–5.2)
RBC # FLD: 17.2 % — HIGH (ref 10.3–14.5)
SARS-COV-2 IGG+IGM SERPL QL IA: 0.4 U/ML — SIGNIFICANT CHANGE UP
SARS-COV-2 IGG+IGM SERPL QL IA: NEGATIVE — SIGNIFICANT CHANGE UP
SODIUM SERPL-SCNC: 132 MMOL/L — LOW (ref 135–145)
SODIUM SERPL-SCNC: 133 MMOL/L — LOW (ref 135–145)
WBC # BLD: 10.75 K/UL — HIGH (ref 3.8–10.5)
WBC # FLD AUTO: 10.75 K/UL — HIGH (ref 3.8–10.5)

## 2021-08-31 PROCEDURE — 99233 SBSQ HOSP IP/OBS HIGH 50: CPT

## 2021-08-31 PROCEDURE — 99223 1ST HOSP IP/OBS HIGH 75: CPT

## 2021-08-31 RX ORDER — FUROSEMIDE 40 MG
40 TABLET ORAL ONCE
Refills: 0 | Status: COMPLETED | OUTPATIENT
Start: 2021-08-31 | End: 2021-08-31

## 2021-08-31 RX ORDER — FUROSEMIDE 40 MG
80 TABLET ORAL EVERY 12 HOURS
Refills: 0 | Status: DISCONTINUED | OUTPATIENT
Start: 2021-08-31 | End: 2021-09-01

## 2021-08-31 RX ORDER — ASCORBIC ACID 60 MG
500 TABLET,CHEWABLE ORAL DAILY
Refills: 0 | Status: DISCONTINUED | OUTPATIENT
Start: 2021-08-31 | End: 2021-09-20

## 2021-08-31 RX ORDER — PHENYLEPHRINE-SHARK LIVER OIL-MINERAL OIL-PETROLATUM RECTAL OINTMENT
1 OINTMENT (GRAM) RECTAL
Refills: 0 | Status: DISCONTINUED | OUTPATIENT
Start: 2021-08-31 | End: 2021-09-20

## 2021-08-31 RX ADMIN — Medication 125 MICROGRAM(S): at 12:58

## 2021-08-31 RX ADMIN — Medication 40 MILLIGRAM(S): at 13:00

## 2021-08-31 RX ADMIN — ENOXAPARIN SODIUM 50 MILLIGRAM(S): 100 INJECTION SUBCUTANEOUS at 12:58

## 2021-08-31 RX ADMIN — Medication 40 MILLIGRAM(S): at 06:20

## 2021-08-31 RX ADMIN — Medication 1: at 16:53

## 2021-08-31 RX ADMIN — Medication 1 TABLET(S): at 17:48

## 2021-08-31 RX ADMIN — Medication 1: at 12:15

## 2021-08-31 RX ADMIN — SENNA PLUS 2 TABLET(S): 8.6 TABLET ORAL at 20:52

## 2021-08-31 RX ADMIN — POLYETHYLENE GLYCOL 3350 17 GRAM(S): 17 POWDER, FOR SOLUTION ORAL at 12:59

## 2021-08-31 RX ADMIN — PANTOPRAZOLE SODIUM 40 MILLIGRAM(S): 20 TABLET, DELAYED RELEASE ORAL at 06:21

## 2021-08-31 RX ADMIN — LOSARTAN POTASSIUM 25 MILLIGRAM(S): 100 TABLET, FILM COATED ORAL at 06:21

## 2021-08-31 RX ADMIN — Medication 1: at 07:58

## 2021-08-31 RX ADMIN — Medication 500 MILLIGRAM(S): at 17:48

## 2021-08-31 NOTE — PHYSICAL THERAPY INITIAL EVALUATION ADULT - PRECAUTIONS/LIMITATIONS, REHAB EVAL
The patient was admitted initially to [facility] 8/26 with symptoms of sob and lower extremity swelling in setting of delaying mitral clip at [facility] reportedly planned 1mo prior. She required dobutamine for IV furosemide diuresis, completed TTE demonstrating severe MR, TR, pulmHTN, and grossly low normal left ventricular systolic function. She additionally was identified to have dysphagia and completed speech and swallow which identify need for dysphagia diet. Wound care RN additionally identified and were treating decubitus wounds which were present prior to hospitalization. The patient was admitted initially to [facility] 8/26 with symptoms of sob and lower extremity swelling in setting of delaying mitral clip at [facility] reportedly planned 1mo prior. She required dobutamine for IV furosemide diuresis, completed TTE demonstrating severe MR, TR, pulmHTN, and grossly low normal left ventricular systolic function. She additionally was identified to have dysphagia and completed speech and swallow which identify need for dysphagia diet. Wound care RN additionally identified and were treating decubitus wounds which were present prior to hospitalization./fall precautions

## 2021-08-31 NOTE — DIETITIAN INITIAL EVALUATION ADULT. - PERTINENT MEDS FT
MEDICATIONS  (STANDING):  dextrose 40% Gel 15 Gram(s) Oral once  dextrose 5%. 1000 milliLiter(s) (50 mL/Hr) IV Continuous <Continuous>  dextrose 5%. 1000 milliLiter(s) (100 mL/Hr) IV Continuous <Continuous>  dextrose 50% Injectable 25 Gram(s) IV Push once  dextrose 50% Injectable 12.5 Gram(s) IV Push once  dextrose 50% Injectable 25 Gram(s) IV Push once  digoxin  Injectable 125 MICROGram(s) IV Push daily  DOBUTamine Infusion 2.504 MICROgram(s)/kG/Min (3.41 mL/Hr) IV Continuous <Continuous>  enoxaparin Injectable 50 milliGRAM(s) SubCutaneous daily  furosemide   Injectable 40 milliGRAM(s) IV Push every 12 hours  glucagon  Injectable 1 milliGRAM(s) IntraMuscular once  insulin lispro (ADMELOG) corrective regimen sliding scale   SubCutaneous three times a day before meals  insulin lispro (ADMELOG) corrective regimen sliding scale   SubCutaneous at bedtime  losartan 25 milliGRAM(s) Oral daily  pantoprazole    Tablet 40 milliGRAM(s) Oral before breakfast  polyethylene glycol 3350 17 Gram(s) Oral daily  senna 2 Tablet(s) Oral at bedtime    MEDICATIONS  (PRN):  acetaminophen   Tablet .. 650 milliGRAM(s) Oral every 6 hours PRN Temp greater or equal to 38C (100.4F), Mild Pain (1 - 3)

## 2021-08-31 NOTE — PROGRESS NOTE ADULT - ASSESSMENT
Ms Davis Ms Davis is an 84y/o female with PMHx of hyperthyroid, asthma, dementia, DM2, atrial fibrillation/flutter s/p AICD ?2015, heart failure admitted to [facility] with acute on chronic CHF and transferred to [facility] for optimization and evaluation for possible mitral FIDELINA.      NYHA II  STS risk for MVR: 12%

## 2021-08-31 NOTE — PHYSICAL THERAPY INITIAL EVALUATION ADULT - PERTINENT HX OF CURRENT PROBLEM, REHAB EVAL
Pt is 86F admitted 8/29/21 PMHx severe MV regurgitation, HFrEF s/p AICD, chronic afib, DM2, HTN, hyperthyroidism presented initially to [facility] with acute heart failure now transferred to [facility] for further management.

## 2021-08-31 NOTE — PROGRESS NOTE ADULT - ATTENDING COMMENTS
85 YO F with with a history of ACC/AHA Stage C NICM with improved LVEF (LV 4.9 cm, LVEF 55%) s/p CRT-D, severe mitral regurgitation being considered for MitraClip at [facility], chronic AF/AFl, and DM2 who was admitted to [facility] with ADHF and transferred to [facility] for consideration of MitraClip. She initially had JUANY and transaminitis and was initiated on dobutamine out of concern for decreased cardiac output.     At this time she is markedly volume overloaded and her mitral regurgitation appears disproportionate to her LV dysfunction and likely driving her heart failure syndrome. Will plan to diurese to euvolemia and evaluate for FIDELINA.      REVIEW OF STUDIES  TTE 8/27: LV 4.9 cm, LVEF 50-55%, severe eccentric posterior MR (appears to be due to immobile and tethered posterior mitral valve leaflet), severe biatrial enlargement, normal RV size/function, severe TR, estimated PASP 62 mmHg    EKG: underlying rhythm AF/AFl, BiV pacing  [facility] 2011: minimal CAD     PLAN  - Continue dobutamine 2.5 mcg/kg/min for now, continue to follow daily LFT’s/lactate. Will plan to wean when optimized and lactate has cleared  - GDMT: continue losartan 25 mg daily. eventual MRA, holding BB while on dobutamine  - Diuretics: increase diuretics to lasix 80 mg IV BID after stat dose 40 mg IV, goal negative 2-3 L daily. please check BID chemistry panels  - Device: s/p CRT with 92% BiV pacing   - AF: continue digoxin and systemic a/c, on therapeutic lovenox per cardiology   - Structural cardiology consult for symptomatic severe MR though she is not compensated at this time. Will need results of R/LHC and likely MILAGROS images from [facility]

## 2021-08-31 NOTE — PROGRESS NOTE ADULT - PROBLEM SELECTOR PLAN 2
- severe TR  - no commercially approved transcatheter intervention for TR  - she would not be a candidate for Triluminate clinical trial due to her mod/severe MR, also likely due to the ICD wire

## 2021-08-31 NOTE — PROGRESS NOTE ADULT - ATTENDING COMMENTS
was used throughout the patients assessment and examination.  Her history of present illness and PMH/PSH/FH/SH was gone over.  Agree with physical examination and assessment as noted above.  Plan for medical optimization and trying to get outpatient medical records.  Per report MILAGROS and cardiac catheterization was performed.  Based upon patients clinical status once medically optimized and prior results will determine if she is a suitable candidate for any percutaneous interventions.  All questions and concerns were addressed.  Findings and plan reviewed with HF team.

## 2021-08-31 NOTE — PROGRESS NOTE ADULT - ASSESSMENT
INCOMPLETE NOTE Ms. Davis is a 85 year old F pmh hyperthyroid, asthma, dementia, t2dm, atrial fibrillation/flutter s/p AICD ?2015, heart failure with EF if 55% with severe tricuspid and mitral regurgitation improving on inotrope assisted diuresis with plan to increase GDMT and continue inotropic support.    #Decompensated heart failure in the setting of mitral regurgitation  -Obtain outside records from [place] re: LHC, RHC, and MILAGROS. If possible, please obtain images from MILAGROS   -Daily LFTs and lactate  -BID chemistry to evaluate electrolytes during diuresis  -Strict I/O, daily standing weights  -Incr to lasix 80 IV BID  -C/w losartan 25 mg daily hold for SBP < 90  -C/w dobutamine as LFTs and lactate stable but not completely resolving  -Follow up structural recommendations  -Follow up EP for interrogation to evaluate % CRT paced    Jt Abdullahi, PGY3 Ms. Davis is a 85 year old F pmh hyperthyroid, asthma, dementia, t2dm, atrial fibrillation/flutter s/p AICD ?2015, heart failure with EF if 55% with severe tricuspid and mitral regurgitation improving on inotrope assisted diuresis with plan to increase GDMT and continue inotropic support.    #Decompensated heart failure in the setting of mitral regurgitation  -Obtain outside records from [place] re: LHC, RHC, and MILAGROS. If possible, please obtain images from MILAGROS   -Daily LFTs and lactate  -BID chemistry to evaluate electrolytes during diuresis  -Strict I/O, daily standing weights  -Incr to lasix 80 IV BID  -C/w losartan 25 mg daily hold for SBP < 90  -C/w dobutamine as LFTs and lactate stable but not completely resolving  -Follow up structural recommendations  -Appreciate EP interrogation. A P ace < 1%, RV pace 90%, LV pace 92%, AT/AF burden 100%    Jt Abdullahi, PGY3

## 2021-08-31 NOTE — DIETITIAN INITIAL EVALUATION ADULT. - REASON INDICATOR FOR ASSESSMENT
Consult received for Pt with pressure injury >2  Source: electronic medical record,  services attempted (ID #[id number])  -- Of note, Per chart Pt is A&Ox1

## 2021-08-31 NOTE — PROCEDURE NOTE - ADDITIONAL PROCEDURE DETAILS
1) Indication for interrogation: P/W HF, NSVT on tele  2) Presenting rhythm: Atrial flutter with BiV pacing 107-110 bpm   3) Measured data WNL, NL BiV ICD function, Pt is not PM dependent but has underlying rhythm atrial flutter with slow ventricular response in the 40's  4) Since last interrogation from 7/29/21, stored data revealed multiple atrial high rate episodes and ventricular high rate episodes between 8/4/21-8/31/21, review of available EGMs consistent with persistent atrial flutter with average ventricular rates  bpm (briefly up to 169-186 bpm) and as well as few episodes of NSVT lasting up to 9 beats. No ICD therapy delivered.    5) HeartLogic with index of 47, based on trend it is consistent with fluid accumulation.  6) Since 7/7/21: A pace <1%, RV pace 90%, LV pace 92%, AT/AF burden 100%  7) Discussed with medicine PATRICIA NunezC  [phone]

## 2021-08-31 NOTE — DIETITIAN INITIAL EVALUATION ADULT. - REASON FOR ADMISSION
85F transferred from [facility] to [facility] for acute heart failure w/ severe Mitral Regurgitation

## 2021-08-31 NOTE — DIETITIAN INITIAL EVALUATION ADULT. - ORAL INTAKE PTA/DIET HISTORY
Pt visited at bedside, per chart noted "Note patient is A&Ox1 at time of admit to [facility] and therefore history is obtained is limited to previous charting. baseline mental status also suggested dementia reporting A&Ox2 baseline at [facility]."   RD attempted interview with  services, however Pt declined interview secondary to pain.  Per H&P, Pt on dysphagia diet PTA (Dx ~1 month PTA). Pt taking folic acid PTA. NKFA per chart.

## 2021-08-31 NOTE — DIETITIAN INITIAL EVALUATION ADULT. - ADD RECOMMEND
1. Continue current diet as ordered; consistency per SLP recommendations 2. Recommend Multivitamin and 500mg Vitamin C to aid in wound healing 3. Recommend Don x2/day (provides 90 Kcal, 2.5 g collagen protein, 14g of essential amino acids per 8 oz. serving) 4. Monitor need for nutritional oral supplementation; if indicated, would recommend Glucerna (provides additional 220kcal, 10g pro per 8 oz. serving) 5. Adjustments to bowel regimen as appropriate 6. Monitor weight trends, PO intake, GI function, electrolytes, and wound healing

## 2021-08-31 NOTE — DIETITIAN INITIAL EVALUATION ADULT. - OTHER CALCULATIONS
estimated nutrient needs based on IBW of 52.1 Kg with considerations for wound healing and Pt with fluid, currently being diuresed; fluids per team

## 2021-08-31 NOTE — DIETITIAN INITIAL EVALUATION ADULT. - PERTINENT LABORATORY DATA
(8/31) Na 133<L>, BUN 70<H>, Cr 1.17, <H>, K+ 5.1, Alk Phos 262<H>, ALT/SGPT 119<H>, AST/SGOT 61<H>; POCT Blood Glucose x24 hrs: 132-177 mg/dL   (8/27) Hgb A1c 7.4%

## 2021-08-31 NOTE — DIETITIAN INITIAL EVALUATION ADULT. - OTHER INFO
Per chart, Pt with fair intake; RN flowsheets documenting 40%, 60%, and 70% meal completions. Pt was provided breakfast at time of RD visit, RD unable to assess meal completion at that time.     Of note, SLP performed bed-side swallow evaluation and recommended Dysphagia 2, Thin Liquids consistency.     GI: Per chart, Pt has no current complaints of nausea, vomiting, diarrhea, or constipation. No BM documented at this time; Pt is currently on bowel regimen in-house.    Current dosing weight: 52.8 Kg (8/29)  Weight Hx per [facility] HIE: 57.6 Kg (3/26/2011)  -- Unable to obtain more recent weight. Will continue to monitor weight trends in-house.

## 2021-08-31 NOTE — PROGRESS NOTE ADULT - SUBJECTIVE AND OBJECTIVE BOX
Structural Heart Team    HPI:  85F Setswana-speaking w/ hx of severe MV regurgitation, HFrEF s/p AICD, chronic afib, DM2, HTN, hyperthyroidism presented initially to [facility] with acute heart failure now transferred to [facility] for further management. The patient was admitted initially to [facility] 8/26 with symptoms of sob and lower extremity swelling in setting of delaying mitral clip at [facility] reportedly planned 1mo prior. She required dobutamine for IV furosemide diuresis, completed TTE demonstrating severe MR, TR, pulmHTN, and grossly low normal left ventricular systolic function. She additionally was identified to have dysphagia and completed speech and swallow which identify need for dysphagia diet. Wound care RN additionally identified and were treating decubitus wounds which were present prior to hospitalization. (29 Aug 2021 23:34)      Allergies    No Known Allergies    Intolerances      PAST MEDICAL & SURGICAL HISTORY:  CHF (Congestive Heart Failure)    COPD (Chronic Obstructive Pulmonary Disease)    Asthma    DM (Diabetes Mellitus)    Artificial Cardiac Pacemaker    Arrhythmia    CVA (Cerebral Vascular Accident)    Presence of cardiac defibrillator      MEDICATIONS  (STANDING):  ascorbic acid 500 milliGRAM(s) Oral daily  dextrose 40% Gel 15 Gram(s) Oral once  dextrose 5%. 1000 milliLiter(s) (50 mL/Hr) IV Continuous <Continuous>  dextrose 5%. 1000 milliLiter(s) (100 mL/Hr) IV Continuous <Continuous>  dextrose 50% Injectable 25 Gram(s) IV Push once  dextrose 50% Injectable 12.5 Gram(s) IV Push once  dextrose 50% Injectable 25 Gram(s) IV Push once  digoxin  Injectable 125 MICROGram(s) IV Push daily  DOBUTamine Infusion 2.504 MICROgram(s)/kG/Min (3.41 mL/Hr) IV Continuous <Continuous>  enoxaparin Injectable 50 milliGRAM(s) SubCutaneous daily  furosemide   Injectable 80 milliGRAM(s) IV Push every 12 hours  glucagon  Injectable 1 milliGRAM(s) IntraMuscular once  insulin lispro (ADMELOG) corrective regimen sliding scale   SubCutaneous three times a day before meals  insulin lispro (ADMELOG) corrective regimen sliding scale   SubCutaneous at bedtime  losartan 25 milliGRAM(s) Oral daily  multivitamin  Chewable 1 Tablet(s) Oral once  pantoprazole    Tablet 40 milliGRAM(s) Oral before breakfast  polyethylene glycol 3350 17 Gram(s) Oral daily  senna 2 Tablet(s) Oral at bedtime      REVIEW OF SYSTEMS:    CONSTITUTIONAL: No weakness, fevers or chills  EYES/ENT: No visual changes;  No vertigo or throat pain   NECK: No pain or stiffness  RESPIRATORY: No cough, wheezing, hemoptysis; No shortness of breath  CARDIOVASCULAR: No chest pain or palpitations  GASTROINTESTINAL: No abdominal or epigastric pain. No nausea, vomiting, or hematemesis; No diarrhea or constipation. No melena or hematochezia.  GENITOURINARY: No dysuria, frequency or hematuria  NEUROLOGICAL: No numbness or weakness  SKIN: No itching, rashes      Vital Signs Last 24 Hrs  T(C): 36.6 (31 Aug 2021 11:44), Max: 36.6 (31 Aug 2021 08:58)  T(F): 97.8 (31 Aug 2021 11:44), Max: 97.8 (31 Aug 2021 08:58)  HR: 106 (31 Aug 2021 12:52) (58 - 120)  BP: 98/58 (31 Aug 2021 12:52) (94/53 - 131/83)  BP(mean): --  RR: 18 (31 Aug 2021 11:44) (17 - 18)  SpO2: 95% (31 Aug 2021 11:44) (95% - 100%)                          9.4    10.75 )-----------( 128      ( 31 Aug 2021 06:48 )             32.0   08-31    133<L>  |  89<L>  |  70<H>  ----------------------------<  128<H>  5.1   |  27  |  1.17    Ca    8.0<L>      31 Aug 2021 06:48  Mg     2.2     08-30    TPro  6.1  /  Alb  3.5  /  TBili  2.3<H>  /  DBili  1.5<H>  /  AST  61<H>  /  ALT  119<H>  /  AlkPhos  262<H>  08-31    PT/INR - ( 30 Aug 2021 06:19 )   PT: 15.0 sec;   INR: 1.26 ratio         PTT - ( 30 Aug 2021 06:19 )  PTT:51.3 sec    I&O's Summary    30 Aug 2021 07:01  -  31 Aug 2021 07:00  --------------------------------------------------------  IN: 480 mL / OUT: 1050 mL / NET: -570 mL    31 Aug 2021 07:01  -  31 Aug 2021 16:18  --------------------------------------------------------  IN: 240 mL / OUT: 650 mL / NET: -410 mL          Physical Exam  General: NAD  Cardiac: s1s2, RRR, II/VI systolic murmur, no rubs or gallops  Pulmonary: soft crackles with decreased breath sounds at the bases b/l  Gastrointestinal: soft abdomen, nontender, nondistended, + bowel sounds throughout  Extremities: 1+ pitting edema, R>L  Neuro: A&Ox3, nonfocal  EKG: Vpaced      < from: Transthoracic Echocardiogram (08.27.21 @ 08:59) >  CONCLUSIONS:  1. Moderate to severe, eccentric postetriorly directed  mitral regurgitation.  2.  Mild aortic regurgitation.  3. Severely dilated left atrium.  LA volume index = 95  cc/m2.  4. Normal left ventricular internal dimensions and wall  thicknesses.  5. Endocardium not well visualized; grossly low normal left  ventricular systolic function.   Segmental wallmotion  could not be assessed.  6. Unable to asses diastolic function due to technical  aspects of this study.  7. Normal right ventricular size and function.   A device  lead is visualized in the right heart.  8. RV systolic pressure is 62 mm Hg. Severe pulmonary  hypertension.  9. There is severe tricuspid regurgitation.    < end of copied text >     Structural Heart Team    HPI:  85F German-speaking w/ hx of severe MV regurgitation, HFrEF s/p AICD, chronic afib, DM2, HTN, hyperthyroidism presented initially to [facility] with acute heart failure now transferred to [facility] for further management. The patient was admitted initially to [facility] 8/26 with symptoms of sob and lower extremity swelling in setting of delaying mitral clip at [facility] reportedly planned 1mo prior. She required dobutamine for IV furosemide diuresis, completed TTE demonstrating severe MR, TR, pulmHTN, and grossly low normal left ventricular systolic function. She additionally was identified to have dysphagia and completed speech and swallow which identify need for dysphagia diet. Wound care RN additionally identified and were treating decubitus wounds which were present prior to hospitalization. (29 Aug 2021 23:34)      Video  #[id number]  On examination, Ms Davis reports that she has no chest pain or pressure and no sob but she is "feeling very badly" due to irritation and pain in her genital region".  She said that she had a bowel movement this morning and has had pain in her perineal region ever since.  During the course of my interview, she repeatedly returned to this complaint, to the detriment of obtaining a cardiac/valvular history.  I was able to ascertain that, prior to admission she was very sob and had been evaluated for Mitraclip at [facility].  She is feeling much better now and has no difficulty breathing.  She does not know why the clip was not done at [facility], though previous chart mentions it may have been secondary to deconditioning.          Allergies    No Known Allergies    Intolerances      PAST MEDICAL & SURGICAL HISTORY:  CHF (Congestive Heart Failure)    COPD (Chronic Obstructive Pulmonary Disease)    Asthma    DM (Diabetes Mellitus)    Artificial Cardiac Pacemaker    Arrhythmia    CVA (Cerebral Vascular Accident)    Presence of cardiac defibrillator      MEDICATIONS  (STANDING):  ascorbic acid 500 milliGRAM(s) Oral daily  dextrose 40% Gel 15 Gram(s) Oral once  dextrose 5%. 1000 milliLiter(s) (50 mL/Hr) IV Continuous <Continuous>  dextrose 5%. 1000 milliLiter(s) (100 mL/Hr) IV Continuous <Continuous>  dextrose 50% Injectable 25 Gram(s) IV Push once  dextrose 50% Injectable 12.5 Gram(s) IV Push once  dextrose 50% Injectable 25 Gram(s) IV Push once  digoxin  Injectable 125 MICROGram(s) IV Push daily  DOBUTamine Infusion 2.504 MICROgram(s)/kG/Min (3.41 mL/Hr) IV Continuous <Continuous>  enoxaparin Injectable 50 milliGRAM(s) SubCutaneous daily  furosemide   Injectable 80 milliGRAM(s) IV Push every 12 hours  glucagon  Injectable 1 milliGRAM(s) IntraMuscular once  insulin lispro (ADMELOG) corrective regimen sliding scale   SubCutaneous three times a day before meals  insulin lispro (ADMELOG) corrective regimen sliding scale   SubCutaneous at bedtime  losartan 25 milliGRAM(s) Oral daily  multivitamin  Chewable 1 Tablet(s) Oral once  pantoprazole    Tablet 40 milliGRAM(s) Oral before breakfast  polyethylene glycol 3350 17 Gram(s) Oral daily  senna 2 Tablet(s) Oral at bedtime      REVIEW OF SYSTEMS:    CONSTITUTIONAL: No weakness, fevers or chills  EYES/ENT: No visual changes;  No vertigo or throat pain   NECK: No pain or stiffness  RESPIRATORY: No cough, wheezing, hemoptysis; No shortness of breath  CARDIOVASCULAR: No chest pain or palpitations  GASTROINTESTINAL: No abdominal or epigastric pain. No nausea, vomiting, or hematemesis; No diarrhea or constipation. No melena or hematochezia.  GENITOURINARY: No dysuria, frequency or hematuria  NEUROLOGICAL: No numbness or weakness  SKIN: No itching, rashes      Vital Signs Last 24 Hrs  T(C): 36.6 (31 Aug 2021 11:44), Max: 36.6 (31 Aug 2021 08:58)  T(F): 97.8 (31 Aug 2021 11:44), Max: 97.8 (31 Aug 2021 08:58)  HR: 106 (31 Aug 2021 12:52) (58 - 120)  BP: 98/58 (31 Aug 2021 12:52) (94/53 - 131/83)  BP(mean): --  RR: 18 (31 Aug 2021 11:44) (17 - 18)  SpO2: 95% (31 Aug 2021 11:44) (95% - 100%)                          9.4    10.75 )-----------( 128      ( 31 Aug 2021 06:48 )             32.0   08-31    133<L>  |  89<L>  |  70<H>  ----------------------------<  128<H>  5.1   |  27  |  1.17    Ca    8.0<L>      31 Aug 2021 06:48  Mg     2.2     08-30    TPro  6.1  /  Alb  3.5  /  TBili  2.3<H>  /  DBili  1.5<H>  /  AST  61<H>  /  ALT  119<H>  /  AlkPhos  262<H>  08-31    PT/INR - ( 30 Aug 2021 06:19 )   PT: 15.0 sec;   INR: 1.26 ratio         PTT - ( 30 Aug 2021 06:19 )  PTT:51.3 sec    I&O's Summary    30 Aug 2021 07:01  -  31 Aug 2021 07:00  --------------------------------------------------------  IN: 480 mL / OUT: 1050 mL / NET: -570 mL    31 Aug 2021 07:01  -  31 Aug 2021 16:18  --------------------------------------------------------  IN: 240 mL / OUT: 650 mL / NET: -410 mL          Physical Exam  General: NAD  Cardiac: s1s2, RRR, II/VI systolic murmur, no rubs or gallops  Pulmonary: soft crackles with decreased breath sounds at the bases b/l  Gastrointestinal: soft abdomen, nontender, nondistended, + bowel sounds throughout  Extremities: 1+ pitting edema, R>L  Neuro: A&Ox3, nonfocal  EKG: Vpaced      < from: Transthoracic Echocardiogram (08.27.21 @ 08:59) >  CONCLUSIONS:  1. Moderate to severe, eccentric postetriorly directed  mitral regurgitation.  2.  Mild aortic regurgitation.  3. Severely dilated left atrium.  LA volume index = 95  cc/m2.  4. Normal left ventricular internal dimensions and wall  thicknesses.  5. Endocardium not well visualized; grossly low normal left  ventricular systolic function.   Segmental wallmotion  could not be assessed.  6. Unable to asses diastolic function due to technical  aspects of this study.  7. Normal right ventricular size and function.   A device  lead is visualized in the right heart.  8. RV systolic pressure is 62 mm Hg. Severe pulmonary  hypertension.  9. There is severe tricuspid regurgitation.    < end of copied text >

## 2021-08-31 NOTE — DIETITIAN INITIAL EVALUATION ADULT. - PROBLEM SELECTOR PLAN 1
- Cardiology Dr. Granados accepted transfer to [facility] and is to resume care in the morning  - continue with fixed dose dobutamine started at [facility] at 2.5mcg/kg/hr  - c/w furosemide 40mg IV every 12 hours as tolerated  - strict I/O, tele monitoring  - will likely need structural heart eval if family and Dr. Granados due not choose palliative management  - Per Dr. Granados to initiate GDMT as tolerated

## 2021-08-31 NOTE — DIETITIAN INITIAL EVALUATION ADULT. - PROBLEM SELECTOR PLAN 5
c/w lovenox dosing for now ([facility] stopped apixaban home dosing)  c/w digoxin dosing and obtain dig level for titration

## 2021-08-31 NOTE — PROGRESS NOTE ADULT - SUBJECTIVE AND OBJECTIVE BOX
PROGRESS NOTE:     Patient is a 86y old  Female who presents with a chief complaint of 85F transferred from [facility] to [facility] for acute heart failure w/ severe Mitral Regurgitation (31 Aug 2021 10:35)      SUBJECTIVE / OVERNIGHT EVENTS:    ADDITIONAL REVIEW OF SYSTEMS:    MEDICATIONS  (STANDING):  dextrose 40% Gel 15 Gram(s) Oral once  dextrose 5%. 1000 milliLiter(s) (50 mL/Hr) IV Continuous <Continuous>  dextrose 5%. 1000 milliLiter(s) (100 mL/Hr) IV Continuous <Continuous>  dextrose 50% Injectable 25 Gram(s) IV Push once  dextrose 50% Injectable 12.5 Gram(s) IV Push once  dextrose 50% Injectable 25 Gram(s) IV Push once  digoxin  Injectable 125 MICROGram(s) IV Push daily  DOBUTamine Infusion 2.504 MICROgram(s)/kG/Min (3.41 mL/Hr) IV Continuous <Continuous>  enoxaparin Injectable 50 milliGRAM(s) SubCutaneous daily  furosemide   Injectable 40 milliGRAM(s) IV Push once  furosemide   Injectable 80 milliGRAM(s) IV Push every 12 hours  glucagon  Injectable 1 milliGRAM(s) IntraMuscular once  insulin lispro (ADMELOG) corrective regimen sliding scale   SubCutaneous three times a day before meals  insulin lispro (ADMELOG) corrective regimen sliding scale   SubCutaneous at bedtime  losartan 25 milliGRAM(s) Oral daily  pantoprazole    Tablet 40 milliGRAM(s) Oral before breakfast  polyethylene glycol 3350 17 Gram(s) Oral daily  senna 2 Tablet(s) Oral at bedtime    MEDICATIONS  (PRN):  acetaminophen   Tablet .. 650 milliGRAM(s) Oral every 6 hours PRN Temp greater or equal to 38C (100.4F), Mild Pain (1 - 3)  hemorrhoidal Ointment 1 Application(s) Rectal four times a day PRN rectal irritation      CAPILLARY BLOOD GLUCOSE      POCT Blood Glucose.: 182 mg/dL (31 Aug 2021 11:29)  POCT Blood Glucose.: 157 mg/dL (31 Aug 2021 07:21)  POCT Blood Glucose.: 155 mg/dL (30 Aug 2021 21:26)  POCT Blood Glucose.: 170 mg/dL (30 Aug 2021 16:29)    I&O's Summary    30 Aug 2021 07:01  -  31 Aug 2021 07:00  --------------------------------------------------------  IN: 480 mL / OUT: 1050 mL / NET: -570 mL    31 Aug 2021 07:01  -  31 Aug 2021 13:10  --------------------------------------------------------  IN: 0 mL / OUT: 500 mL / NET: -500 mL        PHYSICAL EXAM:  Vital Signs Last 24 Hrs  T(C): 36.6 (31 Aug 2021 11:44), Max: 36.6 (31 Aug 2021 08:58)  T(F): 97.8 (31 Aug 2021 11:44), Max: 97.8 (31 Aug 2021 08:58)  HR: 106 (31 Aug 2021 12:52) (58 - 120)  BP: 98/58 (31 Aug 2021 12:52) (94/53 - 131/83)  BP(mean): --  RR: 18 (31 Aug 2021 11:44) (17 - 18)  SpO2: 95% (31 Aug 2021 11:44) (95% - 100%)    CONSTITUTIONAL: NAD, well-developed  CARDIOVASCULAR: Regular rate and rhythm. Normal S1 and S2. No murmurs.  RESPIRATORY: Normal respiratory effort, Lungs CTAB  EXTREMITIES: No SHANT, peripheral pulses intact.  ABDOMEN: Nontender to palpation, normoactive bowel sounds, no rebound/guarding; No hepatosplenomegaly  MUSCLOSKELETAL: no clubbing or cyanosis of digits; no joint swelling or tenderness to palpation  PSYCH: A+O to person, place, and time; affect appropriate    LABS:                        9.4    10.75 )-----------( 128      ( 31 Aug 2021 06:48 )             32.0     08-31    133<L>  |  89<L>  |  70<H>  ----------------------------<  128<H>  5.1   |  27  |  1.17    Ca    8.0<L>      31 Aug 2021 06:48  Mg     2.2     08-30    TPro  6.1  /  Alb  3.5  /  TBili  2.3<H>  /  DBili  1.5<H>  /  AST  61<H>  /  ALT  119<H>  /  AlkPhos  262<H>  08-31    PT/INR - ( 30 Aug 2021 06:19 )   PT: 15.0 sec;   INR: 1.26 ratio         PTT - ( 30 Aug 2021 06:19 )  PTT:51.3 sec            RADIOLOGY & ADDITIONAL TESTS:  Results Reviewed:   Imaging Personally Reviewed:  Electrocardiogram Personally Reviewed:    COORDINATION OF CARE:  Care Discussed with Consultants/Other Providers [Y/N]:  Prior or Outpatient Records Reviewed [Y/N]:   PROGRESS NOTE:     Patient is a 86y old  Female who presents with a chief complaint of 85F transferred from [facility] to [facility] for acute heart failure w/ severe Mitral Regurgitation (31 Aug 2021 10:35)    SUBJECTIVE / OVERNIGHT EVENTS:  JESUS, Started losartan 25 in AM  Complains of abdominal pain  Does not complain of shortness of breath  She is bothered by the chairez catheter    ADDITIONAL REVIEW OF SYSTEMS:  No fevers, chest pain, lower extremity swelling or pain, dysuria, or constipation.    MEDICATIONS  (STANDING):  dextrose 40% Gel 15 Gram(s) Oral once  dextrose 5%. 1000 milliLiter(s) (50 mL/Hr) IV Continuous <Continuous>  dextrose 5%. 1000 milliLiter(s) (100 mL/Hr) IV Continuous <Continuous>  dextrose 50% Injectable 25 Gram(s) IV Push once  dextrose 50% Injectable 12.5 Gram(s) IV Push once  dextrose 50% Injectable 25 Gram(s) IV Push once  digoxin  Injectable 125 MICROGram(s) IV Push daily  DOBUTamine Infusion 2.504 MICROgram(s)/kG/Min (3.41 mL/Hr) IV Continuous <Continuous>  enoxaparin Injectable 50 milliGRAM(s) SubCutaneous daily  furosemide   Injectable 40 milliGRAM(s) IV Push once  furosemide   Injectable 80 milliGRAM(s) IV Push every 12 hours  glucagon  Injectable 1 milliGRAM(s) IntraMuscular once  insulin lispro (ADMELOG) corrective regimen sliding scale   SubCutaneous three times a day before meals  insulin lispro (ADMELOG) corrective regimen sliding scale   SubCutaneous at bedtime  losartan 25 milliGRAM(s) Oral daily  pantoprazole    Tablet 40 milliGRAM(s) Oral before breakfast  polyethylene glycol 3350 17 Gram(s) Oral daily  senna 2 Tablet(s) Oral at bedtime    MEDICATIONS  (PRN):  acetaminophen   Tablet .. 650 milliGRAM(s) Oral every 6 hours PRN Temp greater or equal to 38C (100.4F), Mild Pain (1 - 3)  hemorrhoidal Ointment 1 Application(s) Rectal four times a day PRN rectal irritation      CAPILLARY BLOOD GLUCOSE      POCT Blood Glucose.: 182 mg/dL (31 Aug 2021 11:29)  POCT Blood Glucose.: 157 mg/dL (31 Aug 2021 07:21)  POCT Blood Glucose.: 155 mg/dL (30 Aug 2021 21:26)  POCT Blood Glucose.: 170 mg/dL (30 Aug 2021 16:29)    I&O's Summary    30 Aug 2021 07:01  -  31 Aug 2021 07:00  --------------------------------------------------------  IN: 480 mL / OUT: 1050 mL / NET: -570 mL    31 Aug 2021 07:01  -  31 Aug 2021 13:10  --------------------------------------------------------  IN: 0 mL / OUT: 500 mL / NET: -500 mL        PHYSICAL EXAM:  Vital Signs Last 24 Hrs  T(C): 36.6 (31 Aug 2021 11:44), Max: 36.6 (31 Aug 2021 08:58)  T(F): 97.8 (31 Aug 2021 11:44), Max: 97.8 (31 Aug 2021 08:58)  HR: 106 (31 Aug 2021 12:52) (58 - 120)  BP: 98/58 (31 Aug 2021 12:52) (94/53 - 131/83)  BP(mean): --  RR: 18 (31 Aug 2021 11:44) (17 - 18)  SpO2: 95% (31 Aug 2021 11:44) (95% - 100%)    CONSTITUTIONAL: NAD, well-developed  CARDIOVASCULAR: Regular rate and rhythm. Normal S1 and S2. 2/6 holosytolic murmur radiating to back and axilla. JVD unchanged, elevated  RESPIRATORY: Bilateral crackles, decreased bs at bilateral bases  EXTREMITIES: No SHANT, peripheral pulses intact.  ABDOMEN: Nontender to palpation, normoactive bowel sounds, no rebound/guarding; No hepatosplenomegaly  MUSCLOSKELETAL: no clubbing or cyanosis of digits; no joint swelling or tenderness to palpation  PSYCH: A+O to person, place, and time; affect appropriate    LABS:                        9.4    10.75 )-----------( 128      ( 31 Aug 2021 06:48 )             32.0     08-31    133<L>  |  89<L>  |  70<H>  ----------------------------<  128<H>  5.1   |  27  |  1.17    Ca    8.0<L>      31 Aug 2021 06:48  Mg     2.2     08-30    TPro  6.1  /  Alb  3.5  /  TBili  2.3<H>  /  DBili  1.5<H>  /  AST  61<H>  /  ALT  119<H>  /  AlkPhos  262<H>  08-31    PT/INR - ( 30 Aug 2021 06:19 )   PT: 15.0 sec;   INR: 1.26 ratio         PTT - ( 30 Aug 2021 06:19 )  PTT:51.3 sec            RADIOLOGY & ADDITIONAL TESTS:  Results Reviewed: Lactate and LFTs unchanged. Cr stable  Imaging Personally Reviewed:  Electrocardiogram Personally Reviewed:  Telemetry: Afib and aflutter V paced with runs of wide complex tachycardia     COORDINATION OF CARE:  Care Discussed with Consultants/Other Providers [Y/N]:  Prior or Outpatient Records Reviewed [Y/N]:

## 2021-08-31 NOTE — PROGRESS NOTE ADULT - SUBJECTIVE AND OBJECTIVE BOX
DATE OF SERVICE:  8/31/2021  Patient was seen and examined on 8/31/2021    .Interim events noted.Consultant notes ,Labs,Telemetry reviewed by me    PRESENTING CC:Dyspnea    HPI and HOSPITAL COURSE: HPI:  Note patient is A&Ox1 at time of admit to [facility] and therefore history is obtained is limited to previous charting. baseline mental status also suggested dementia reporting A&Ox2 baseline at [facility]    85F Sinhala-speaking w/ hx of severe MV regurgitation, HFrEF s/p AICD, chronic afib, DM2, HTN, hyperthyroidism presented initially to [facility] with acute heart failure now transferred to [facility] for further management. The patient was admitted initially to [facility] 8/26 with symptoms of sob and lower extremity swelling in setting of delaying mitral clip at [facility] reportedly planned 1mo prior. She required dobutamine for IV furosemide diuresis, completed TTE demonstrating severe MR, TR, pulmHTN, and grossly low normal left ventricular systolic function. She additionally was identified to have dysphagia and completed speech and swallow which identify need for dysphagia diet. Wound care RN additionally identified and were treating decubitus wounds which were present prior to hospitalization. (29 Aug 2021 23:34)      INTERIM EVENTS:Awake a;ert not dyspneac leg edema is less      PMH -reviewed admission note, no change since admission  Heart Failure: Acute [ ] Chronic [ ] Acute on Chronic [x ] Diastolic [ ] Systolic [x ] Combined Systolic and Diastolic[ ]  JUANY[ ]x  ATN[ ]  CKD I [ ] CKDII [ ] CKD III [ ] CKD IV [ ] CKD V [ ] ESRD[ ]  HTN[ ] CVA[ ] DM[ ] COPD[ ] COVID[ ] AF[x ]  PPM[ ] ICD[ ]    MEDICATIONS  (STANDING):  digoxin  Injectable 125 MICROGram(s) IV Push daily  DOBUTamine Infusion 2.504 MICROgram(s)/kG/Min (3.41 mL/Hr) IV Continuous <Continuous>  enoxaparin Injectable 50 milliGRAM(s) SubCutaneous daily  furosemide   Injectable 40 milliGRAM(s) IV Push every 12 hours  glucagon  Injectable 1 milliGRAM(s) IntraMuscular once  insulin lispro (ADMELOG) corrective regimen sliding scale   SubCutaneous three times a day before meals  insulin lispro (ADMELOG) corrective regimen sliding scale   SubCutaneous at bedtime  losartan 25 milliGRAM(s) Oral daily  pantoprazole    Tablet 40 milliGRAM(s) Oral before breakfast  polyethylene glycol 3350 17 Gram(s) Oral daily  senna 2 Tablet(s) Oral at bedtime    MEDICATIONS  (PRN):  acetaminophen   Tablet .. 650 milliGRAM(s) Oral every 6 hours PRN Temp greater or equal to 38C (100.4F), Mild Pain (1 - 3)            REVIEW OF SYSTEMS:  Constitutional: [ ] fever, [ ]weight loss,  [x ]fatigue  Eyes: [ ] visual changes  Respiratory: [ x]shortness of breath;  [ ] cough, [ ]wheezing, [ ]chills, [ ]hemoptysis  Cardiovascular: [ ] chest pain, [ ]palpitations, [ ]dizziness,  [x ]leg swelling[ ]orthopnea[ ]PND  Gastrointestinal: [ ] abdominal pain, [ ]nausea, [ ]vomiting,  [ ]diarrhea [ ]Constipation [ ]Melena  Genitourinary: [ ] dysuria, [ ] hematuria [ ]Esquivel  Neurologic: [ ] headaches [ ] tremors[ ]weakness [ ]Paralysis Right[ ] Left[ ]  Skin: [ ] itching, [ ]burning, [ ] rashes  Endocrine: [ ] heat or cold intolerance  Musculoskeletal: [ ] joint pain or swelling; [ ] muscle, back, or extremity pain  Psychiatric: [ ] depression, [ ]anxiety, [ ]mood swings, or [ ]difficulty sleeping  Hematologic: [ ] easy bruising, [ ] bleeding gums    [x] All remaining systems negative except as per above.   [ ]Unable to obtain.    Vital Signs Last 24 Hrs  T(C): 36.4 (31 Aug 2021 04:26), Max: 36.4 (30 Aug 2021 11:42)  T(F): 97.5 (31 Aug 2021 04:26), Max: 97.6 (30 Aug 2021 11:42)  HR: 91 (31 Aug 2021 04:26) (91 - 120)  BP: 131/83 (31 Aug 2021 04:26) (100/72 - 131/83)  RR: 17 (31 Aug 2021 04:26) (17 - 18)  SpO2: 100% (31 Aug 2021 04:26) (99% - 100%)  I&O's Summary    30 Aug 2021 07:01  -  31 Aug 2021 06:43  --------------------------------------------------------  IN: 480 mL / OUT: 1050 mL / NET: -570 mL        PHYSICAL EXAM:  General: No acute distress BMI-25  HEENT: EOMI, PERRL  Neck: Supple, [x ] JVD  Lungs: Equal air entry bilaterally; [ ] rales [ ] wheezing [ ] rhonchi  Heart: Regular rate and rhythm; [x ] murmur   3/6 [x ] systolic [ ] diastolic [ ] radiation[ ] rubs [ ]  gallops  Abdomen: Nontender, bowel sounds present  Extremities: No clubbing, cyanosis, [v ] edema [ ]Pulses  equal and intact  Nervous system:  Alert & Oriented X3, no focal deficits  Psychiatric: Normal affect  Skin: No rashes or lesions    LABS:  08-30    138  |  93<L>  |  59<H>  ----------------------------<  149<H>  4.2   |  28  |  1.07    Ca    8.3<L>      30 Aug 2021 06:19  Mg     2.2     08-30    TPro  6.3  /  Alb  3.8  /  TBili  1.6<H>  /  DBili  x   /  AST  52<H>  /  ALT  119<H>  /  AlkPhos  224<H>  08-30    Creatinine Trend: 1.07<--, 1.11<--, 1.16<--, 1.66<--, 1.81<--                        9.8    10.86 )-----------( 129      ( 30 Aug 2021 06:19 )             33.2     PT/INR - ( 30 Aug 2021 06:19 )   PT: 15.0 sec;   INR: 1.26 ratio         PTT - ( 30 Aug 2021 06:19 )  PTT:51.3 sec            ECHO:CONCLUSIONS:  1. Moderate to severe, eccentric postetriorly directed  mitral regurgitation.  2.  Mild aortic regurgitation.  3. Severely dilated left atrium.  LA volume index = 95  cc/m2.  4. Normal left ventricular internal dimensions and wall  thicknesses.  5. Endocardium not well visualized; grossly low normal left  ventricular systolic function.   Segmental wallmotion  could not be assessed.  6. Unable to asses diastolic function due to technical  aspects of this study.  7. Normal right ventricular size and function.   A device  lead is visualized in the right heart.  8. RV systolic pressure is 62 mm Hg. Severe pulmonary  hypertension.  9. There is severe tricuspid regurgitation.    STRESS TEST:    CATHETERIZATION:03/21/2011  Ventricles: Global left ventricular function was severely depressed.  Valves: Mitral valve: The mitral valve exhibited moderate to severe (3+/4+)  regurgitation.  Coronary vessels: The coronary circulation is right dominant.  LM:      LM: Angiography showed minor luminal irregularities with no flow  limiting lesions.  LAD:      LAD: Normal.  CX:      Proximal circumflex: Angiography showed mild atherosclerosis with  no flow limiting lesions.  RCA:      RCA: Normal.        IMPRESSION AND PLAN:    84 yo F with multiple comorbidities admitted for Acute on Chronic CHF    Problem/Plan - 1:  ·  Problem: Acute on chronic systolic congestive heart failure.   ·  Plan: -C/W shortness of breadth, O2 sat 99% on 2 l NC, dropped to 96% on RA with shortness of breadth   -Pt is on torsemide 10mg OD  -Chest X-ray showed pulmonary edema with cardiomegaly  -S/P 1 dose of Lasix 40mg IV in ED   -Will starting pt on Lasix 40mg IV BID  -Hx of severe MR and severe global systolic dysfunction  , at [facility] recently for Mitral clip   -Trop 0.107  ProBNP 3715  -HF input appreciated  -  Problem/Plan - 2:  ·  Problem: JUANY (acute kidney injury).   ·  Plan: -BUN 76 Cr 1.81  -Likely in the setting of Pre renal due to fluid overload   -S/P Lasix IV in ED 40mg   -Continue with Lasix 40mg IV BID  -Improved      Problem/Plan - 3:  ·  Problem: Anemia.   ·  Plan: -Pt with hgb of 8.6 MCV 77.7  -No active s/s of bleeding   -Total Iron: 11 (L), % Iron Saturation 3 (L).  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    Problem/Plan - 4:  ·  Problem: Thrombocytopenia.   ·  Plan: -Pt with plt 130  -No active s/s of bleeding , no petechial rash   -Monitor plt.    Problem/Plan - 5:  ·  Problem: Transaminitis.   ·  Plan: -Pt with elevated of alk phos 188 AST 91   -? in the setting of passive hepatic congestion  - JVD noted on exam  -Monitor LFTs.    Problem/Plan - 6:  ·  Problem: Mitral regurgitation.   ·  Plan: -Pt with severe MR   -Failed dysphagia screen in ED  -Hold off Coreg and Imdur.    Problem/Plan - 7:  ·  Problem: HTN (hypertension).   ·  Plan: -On Coreg, Torsemide and Imdur  -Failed dysphagia screen in ED  -Hold off Coreg and Imdur   -F/U S/S eval.    Problem/Plan - 8:  ·  Problem: DM (diabetes mellitus).   ·  Plan: -On metformin at home   -SSI ACHS  - HgbA1C 7.4.    Problem/Plan - 9:  ·  Problem: AICD-CRT  ·  Plan: -Pt with AICD  -Interrogation in am.    Problem/Plan - 10:  ·  Problem: Chronic atrial fibrillation.   ·  Plan; -On Eliquis   -Failed dysphagia screen   -Will change Eliquis to full dose Lovenox   -50mg once daily due to Cr Cl<30  -Will change oral digoxin to IV 125mcg to 100mcg IV.    Problem/Plan - 11:  ·  Problem: Prophylactic measure.   ·  Plan: AC Lovenox    Asthma:  PRN albuterol    Constipation   Senna Miralax.

## 2021-08-31 NOTE — PHYSICAL THERAPY INITIAL EVALUATION ADULT - PLANNED THERAPY INTERVENTIONS, PT EVAL
GOALS: Pt will negotiate 12 steps with unilateral handrail & step to pattern with CG/Betty in 4wks/bed mobility training/gait training/transfer training

## 2021-08-31 NOTE — DIETITIAN INITIAL EVALUATION ADULT. - CHIEF COMPLAINT
Per chart, "85 year old female Maltese-speaking PMH severe MV regurgitation, sev pulm HTN, HFrEF s/p AICD, chronic afib, DM2, HTN, hyperthyroidism presented initially to [facility] with acute heart failure now transferred to [facility] for further management. DX: Acute on chronic systolic CHF exacerbation on dobutamine gtt @ 2.5 mcg/min, Lasix."

## 2021-09-01 LAB
ANION GAP SERPL CALC-SCNC: 11 MMOL/L — SIGNIFICANT CHANGE UP (ref 5–17)
ANION GAP SERPL CALC-SCNC: 11 MMOL/L — SIGNIFICANT CHANGE UP (ref 5–17)
BASOPHILS # BLD AUTO: 0.01 K/UL — SIGNIFICANT CHANGE UP (ref 0–0.2)
BASOPHILS NFR BLD AUTO: 0.1 % — SIGNIFICANT CHANGE UP (ref 0–2)
BUN SERPL-MCNC: 65 MG/DL — HIGH (ref 7–23)
BUN SERPL-MCNC: 69 MG/DL — HIGH (ref 7–23)
CALCIUM SERPL-MCNC: 7.8 MG/DL — LOW (ref 8.4–10.5)
CALCIUM SERPL-MCNC: 8 MG/DL — LOW (ref 8.4–10.5)
CHLORIDE SERPL-SCNC: 93 MMOL/L — LOW (ref 96–108)
CHLORIDE SERPL-SCNC: 94 MMOL/L — LOW (ref 96–108)
CO2 SERPL-SCNC: 29 MMOL/L — SIGNIFICANT CHANGE UP (ref 22–31)
CO2 SERPL-SCNC: 31 MMOL/L — SIGNIFICANT CHANGE UP (ref 22–31)
CREAT SERPL-MCNC: 1.05 MG/DL — SIGNIFICANT CHANGE UP (ref 0.5–1.3)
CREAT SERPL-MCNC: 1.11 MG/DL — SIGNIFICANT CHANGE UP (ref 0.5–1.3)
EOSINOPHIL # BLD AUTO: 0.03 K/UL — SIGNIFICANT CHANGE UP (ref 0–0.5)
EOSINOPHIL NFR BLD AUTO: 0.3 % — SIGNIFICANT CHANGE UP (ref 0–6)
GLUCOSE BLDC GLUCOMTR-MCNC: 145 MG/DL — HIGH (ref 70–99)
GLUCOSE BLDC GLUCOMTR-MCNC: 153 MG/DL — HIGH (ref 70–99)
GLUCOSE BLDC GLUCOMTR-MCNC: 157 MG/DL — HIGH (ref 70–99)
GLUCOSE BLDC GLUCOMTR-MCNC: 167 MG/DL — HIGH (ref 70–99)
GLUCOSE SERPL-MCNC: 133 MG/DL — HIGH (ref 70–99)
GLUCOSE SERPL-MCNC: 98 MG/DL — SIGNIFICANT CHANGE UP (ref 70–99)
HCT VFR BLD CALC: 33.6 % — LOW (ref 34.5–45)
HGB BLD-MCNC: 9.8 G/DL — LOW (ref 11.5–15.5)
IMM GRANULOCYTES NFR BLD AUTO: 0.7 % — SIGNIFICANT CHANGE UP (ref 0–1.5)
LYMPHOCYTES # BLD AUTO: 1.3 K/UL — SIGNIFICANT CHANGE UP (ref 1–3.3)
LYMPHOCYTES # BLD AUTO: 11.2 % — LOW (ref 13–44)
MAGNESIUM SERPL-MCNC: 2.2 MG/DL — SIGNIFICANT CHANGE UP (ref 1.6–2.6)
MAGNESIUM SERPL-MCNC: 2.3 MG/DL — SIGNIFICANT CHANGE UP (ref 1.6–2.6)
MCHC RBC-ENTMCNC: 23.7 PG — LOW (ref 27–34)
MCHC RBC-ENTMCNC: 29.2 GM/DL — LOW (ref 32–36)
MCV RBC AUTO: 81.2 FL — SIGNIFICANT CHANGE UP (ref 80–100)
MONOCYTES # BLD AUTO: 1.26 K/UL — HIGH (ref 0–0.9)
MONOCYTES NFR BLD AUTO: 10.9 % — SIGNIFICANT CHANGE UP (ref 2–14)
NEUTROPHILS # BLD AUTO: 8.9 K/UL — HIGH (ref 1.8–7.4)
NEUTROPHILS NFR BLD AUTO: 76.8 % — SIGNIFICANT CHANGE UP (ref 43–77)
NRBC # BLD: 0 /100 WBCS — SIGNIFICANT CHANGE UP (ref 0–0)
PLATELET # BLD AUTO: 128 K/UL — LOW (ref 150–400)
POTASSIUM SERPL-MCNC: 4.9 MMOL/L — SIGNIFICANT CHANGE UP (ref 3.5–5.3)
POTASSIUM SERPL-MCNC: 4.9 MMOL/L — SIGNIFICANT CHANGE UP (ref 3.5–5.3)
POTASSIUM SERPL-SCNC: 4.9 MMOL/L — SIGNIFICANT CHANGE UP (ref 3.5–5.3)
POTASSIUM SERPL-SCNC: 4.9 MMOL/L — SIGNIFICANT CHANGE UP (ref 3.5–5.3)
RBC # BLD: 4.14 M/UL — SIGNIFICANT CHANGE UP (ref 3.8–5.2)
RBC # FLD: 17.2 % — HIGH (ref 10.3–14.5)
SODIUM SERPL-SCNC: 133 MMOL/L — LOW (ref 135–145)
SODIUM SERPL-SCNC: 136 MMOL/L — SIGNIFICANT CHANGE UP (ref 135–145)
WBC # BLD: 11.58 K/UL — HIGH (ref 3.8–10.5)
WBC # FLD AUTO: 11.58 K/UL — HIGH (ref 3.8–10.5)

## 2021-09-01 PROCEDURE — 99233 SBSQ HOSP IP/OBS HIGH 50: CPT

## 2021-09-01 PROCEDURE — 99223 1ST HOSP IP/OBS HIGH 75: CPT | Mod: GC

## 2021-09-01 RX ORDER — SPIRONOLACTONE 25 MG/1
25 TABLET, FILM COATED ORAL DAILY
Refills: 0 | Status: DISCONTINUED | OUTPATIENT
Start: 2021-09-02 | End: 2021-09-20

## 2021-09-01 RX ORDER — BUMETANIDE 0.25 MG/ML
4 INJECTION INTRAMUSCULAR; INTRAVENOUS ONCE
Refills: 0 | Status: COMPLETED | OUTPATIENT
Start: 2021-09-01 | End: 2021-09-01

## 2021-09-01 RX ORDER — BENZOCAINE AND MENTHOL 5; 1 G/100ML; G/100ML
1 LIQUID ORAL
Refills: 0 | Status: DISCONTINUED | OUTPATIENT
Start: 2021-09-01 | End: 2021-09-20

## 2021-09-01 RX ORDER — SPIRONOLACTONE 25 MG/1
25 TABLET, FILM COATED ORAL ONCE
Refills: 0 | Status: COMPLETED | OUTPATIENT
Start: 2021-09-01 | End: 2021-09-01

## 2021-09-01 RX ORDER — BUMETANIDE 0.25 MG/ML
1 INJECTION INTRAMUSCULAR; INTRAVENOUS
Qty: 20 | Refills: 0 | Status: DISCONTINUED | OUTPATIENT
Start: 2021-09-01 | End: 2021-09-02

## 2021-09-01 RX ADMIN — Medication 80 MILLIGRAM(S): at 06:26

## 2021-09-01 RX ADMIN — BUMETANIDE 5 MG/HR: 0.25 INJECTION INTRAMUSCULAR; INTRAVENOUS at 19:37

## 2021-09-01 RX ADMIN — BUMETANIDE 132 MILLIGRAM(S): 0.25 INJECTION INTRAMUSCULAR; INTRAVENOUS at 18:17

## 2021-09-01 RX ADMIN — Medication 125 MICROGRAM(S): at 13:36

## 2021-09-01 RX ADMIN — SPIRONOLACTONE 25 MILLIGRAM(S): 25 TABLET, FILM COATED ORAL at 18:52

## 2021-09-01 RX ADMIN — Medication 1: at 08:04

## 2021-09-01 RX ADMIN — LOSARTAN POTASSIUM 25 MILLIGRAM(S): 100 TABLET, FILM COATED ORAL at 06:27

## 2021-09-01 RX ADMIN — Medication 1: at 12:12

## 2021-09-01 RX ADMIN — Medication 500 MILLIGRAM(S): at 13:24

## 2021-09-01 RX ADMIN — POLYETHYLENE GLYCOL 3350 17 GRAM(S): 17 POWDER, FOR SOLUTION ORAL at 13:23

## 2021-09-01 RX ADMIN — ENOXAPARIN SODIUM 50 MILLIGRAM(S): 100 INJECTION SUBCUTANEOUS at 13:24

## 2021-09-01 RX ADMIN — SENNA PLUS 2 TABLET(S): 8.6 TABLET ORAL at 21:00

## 2021-09-01 RX ADMIN — PANTOPRAZOLE SODIUM 40 MILLIGRAM(S): 20 TABLET, DELAYED RELEASE ORAL at 06:27

## 2021-09-01 NOTE — PROGRESS NOTE ADULT - ATTENDING COMMENTS
85 YO F with with a history of ACC/AHA Stage C NICM with improved LVEF (LV 4.9 cm, LVEF 55%) s/p CRT-D, severe mitral regurgitation being considered for MitraClip at [facility], chronic AF/AFl, and DM2 who was admitted to [facility] with ADHF and transferred to [facility] for consideration of MitraClip. She initially had JUANY and transaminitis and was initiated on dobutamine out of concern for decreased cardiac output.     At this time she is markedly volume overloaded and her mitral regurgitation appears disproportionate to her LV dysfunction and likely driving her heart failure syndrome. Will plan to diurese to euvolemia and evaluate for FIDELINA.      REVIEW OF STUDIES  TTE 8/27: LV 4.9 cm, LVEF 50-55%, severe eccentric posterior MR (appears to be due to immobile and tethered posterior mitral valve leaflet), severe biatrial enlargement, normal RV size/function, severe TR, estimated PASP 62 mmHg    EKG: underlying rhythm AF/AFl, BiV pacing  [facility] 2011: minimal CAD     PLAN  - Continue dobutamine 2.5 mcg/kg/min for now, continue to follow daily LFT’s/lactate. Will plan to wean when optimized and lactate has cleared  - GDMT: continue losartan 25 mg daily. start spironolactone 25 mg daily. holding BB while on dobutamine  - Diuretics: bolus bumex 4 mg IV and start drip at 1 mg/hr, goal negative 2-3 L daily. please check BID chemistry panels  - Device: s/p CRT with 92% BiV pacing   - AF: continue digoxin and systemic a/c, on therapeutic lovenox per cardiology. check digoxin level   - Structural cardiology consult for symptomatic severe MR though she is not compensated at this time. Will need results of R/LHC and MILAGROS images from [facility]

## 2021-09-01 NOTE — PROGRESS NOTE ADULT - ASSESSMENT
INCOMPLETE NOTE Ms. Davis is a 85 year old F pmh hyperthyroid, asthma, dementia, t2dm, atrial fibrillation/flutter s/p AICD ?2015, heart failure with EF if 55% with severe tricuspid and mitral regurgitation improving on inotrope assisted diuresis with plan to increase GDMT and continue inotropic support.    #Decompensated heart failure in the setting of mitral regurgitation  -Obtain outside records from [place] re: LHC, RHC, and MILAGROS. If possible, please obtain images from MILAGROS   -Daily LFTs and lactate  -BID chemistry to evaluate electrolytes during diuresis  -Bumex 4 mg IV now followed by bumex gtt at 1 mg/hr  -C/w losartan 25 mg daily hold for SBP < 90  -C/w dobutamine  -Follow up structural recommendations  -Please obtain digoxin level    Jt Abdullahi, PGY3

## 2021-09-01 NOTE — CONSULT NOTE ADULT - ATTENDING COMMENTS
87 YO F with with a history of ACC/AHA Stage C NICM with improved LVEF (LV 4.9 cm, LVEF 55%) s/p CRT-D, severe mitral regurgitation being considered for MitraClip at [facility], and DM2 who was admitted to [facility] with ADHF and transferred to [facility] for consideration of MitraClip. She initially had ADKI and transaminitis and  was initiated on dobutamine out of concern for decreased cardiac output.     At this time she is markedly volume overloaded and her mitral regurgitation appears disproportionate to her LV dysfunction and likely driving her heart failure syndrome. Will plan to diurese to euvolemia and evaluate for FIDELINA.      REVIEW OF STUDIES  TTE 8/27: LV 4.9 cm, LVEF 50-55%, severe eccentric posterior MR (appears to be due to immobile and tethered posterior mitral valve leaflet), severe biatrial enlargement, normal RV size/function, severe TR, estimated PASP 62 mmHg    EKG: underlying rhythm AF/AFl, BiV pacing  [facility] 2011: minimal CAD     PLAN  - Continue dobutamine 2.5 mcg/kg/min for now, continue to follow daily LFT’s/lactate. Will plan to wean when optimized  - Start losartan 25 mg daily. Eventual MRA. Holding BB while on dobutamine  - Continue Lasix 40 IV BID for now, goal negative 2-3 L daily. Strict I's and O's and daily standing weights.   - Would interrogate device to ensure optimal CRT pacing in setting of background AF   - Continue digoxin for rapid AF  - Structural cardiology consult for symptomatic severe MR though she is not compensated at this time. Will need results of R/LHC and likely MILAGROS images from [facility]
Mildly elevated hepatocellular + cholestatic liver injury likely from congestive hepatopathy.  Recommend acute hepatitis panel and ultrasound. F/u outpatient.

## 2021-09-01 NOTE — CONSULT NOTE ADULT - ASSESSMENT
85F Bangladeshi-speaking w/ hx of severe MV regurgitation, HFrEF s/p AICD, chronic afib, DM2, HTN, hyperthyroidism presented initially to [facility] with acute heart failure now transferred to [facility] for further management.      IMPRESSION:  #      Recommendations:  -        Jessica White, PGY-1    NON-URGENT CONSULTS:  Please email [email or web address]  [email or web address]  AT NIGHT AND ON WEEKENDS:  Contact on-call GI fellow via answering service ([phone]) from 5pm-8am and on weekends/holidays  MONDAY-FRIDAY 8AM-5PM:  GI Phone# [phone] ([facility]) 85F Togolese-speaking w/ hx of severe MV regurgitation, HFrEF s/p AICD, chronic afib, DM2, HTN, hyperthyroidism presented initially to [facility] with acute heart failure now transferred to [facility] for further management.      IMPRESSION:  #Congestive hepatopathy- LFTs elevated in the setting of HFrEF, severe mitral regurg, severe tricuspid regurg.      Recommendations:  - Please obtain a hepatology panel  - Please obtain an abdominal U/S      Jessica White, PGY-1    NON-URGENT CONSULTS:  Please email [email or web address]  [email or web address]  AT NIGHT AND ON WEEKENDS:  Contact on-call GI fellow via answering service ([phone]) from 5pm-8am and on weekends/holidays  MONDAY-FRIDAY 8AM-5PM:  ALVARADO Phone# [phone] ([facility]) 85F Croatian-speaking w/ hx of severe MV regurgitation, HFrEF s/p AICD, chronic afib, DM2, HTN, hyperthyroidism presented initially to [facility] with acute heart failure now transferred to [facility] for further management.      IMPRESSION:  #Congestive hepatopathy- LFTs elevated in the setting of HFrEF, severe mitral regurg, severe tricuspid regurg.      Recommendations:  - Please obtain a hepatology panel  - Please obtain an abdominal U/S    Hepatology will sign off      Jessica White, PGY-1    NON-URGENT CONSULTS:  Please email [email or web address]  [email or web address]  AT NIGHT AND ON WEEKENDS:  Contact on-call GI fellow via answering service ([phone]) from 5pm-8am and on weekends/holidays  MONDAY-FRIDAY 8AM-5PM:  GI Phone# [phone] ([facility])

## 2021-09-01 NOTE — PROGRESS NOTE ADULT - SUBJECTIVE AND OBJECTIVE BOX
PROGRESS NOTE:     Patient is a 86y old  Female who presents with a chief complaint of 85F transferred from [facility] to [facility] for acute heart failure w/ severe Mitral Regurgitation (01 Sep 2021 09:23)      SUBJECTIVE / OVERNIGHT EVENTS:    ADDITIONAL REVIEW OF SYSTEMS:    MEDICATIONS  (STANDING):  ascorbic acid 500 milliGRAM(s) Oral daily  dextrose 40% Gel 15 Gram(s) Oral once  dextrose 5%. 1000 milliLiter(s) (50 mL/Hr) IV Continuous <Continuous>  dextrose 5%. 1000 milliLiter(s) (100 mL/Hr) IV Continuous <Continuous>  dextrose 50% Injectable 25 Gram(s) IV Push once  dextrose 50% Injectable 12.5 Gram(s) IV Push once  dextrose 50% Injectable 25 Gram(s) IV Push once  digoxin  Injectable 125 MICROGram(s) IV Push daily  DOBUTamine Infusion 2.504 MICROgram(s)/kG/Min (3.41 mL/Hr) IV Continuous <Continuous>  enoxaparin Injectable 50 milliGRAM(s) SubCutaneous daily  furosemide   Injectable 80 milliGRAM(s) IV Push every 12 hours  glucagon  Injectable 1 milliGRAM(s) IntraMuscular once  insulin lispro (ADMELOG) corrective regimen sliding scale   SubCutaneous three times a day before meals  insulin lispro (ADMELOG) corrective regimen sliding scale   SubCutaneous at bedtime  losartan 25 milliGRAM(s) Oral daily  pantoprazole    Tablet 40 milliGRAM(s) Oral before breakfast  polyethylene glycol 3350 17 Gram(s) Oral daily  senna 2 Tablet(s) Oral at bedtime    MEDICATIONS  (PRN):  acetaminophen   Tablet .. 650 milliGRAM(s) Oral every 6 hours PRN Temp greater or equal to 38C (100.4F), Mild Pain (1 - 3)  hemorrhoidal Ointment 1 Application(s) Rectal four times a day PRN rectal irritation      CAPILLARY BLOOD GLUCOSE  151 (31 Aug 2021 21:46)      POCT Blood Glucose.: 157 mg/dL (01 Sep 2021 11:51)  POCT Blood Glucose.: 153 mg/dL (01 Sep 2021 07:33)  POCT Blood Glucose.: 171 mg/dL (31 Aug 2021 16:18)    I&O's Summary    31 Aug 2021 07:01  -  01 Sep 2021 07:00  --------------------------------------------------------  IN: 360 mL / OUT: 850 mL / NET: -490 mL    01 Sep 2021 07:01  -  01 Sep 2021 14:05  --------------------------------------------------------  IN: 120 mL / OUT: 400 mL / NET: -280 mL        PHYSICAL EXAM:  Vital Signs Last 24 Hrs  T(C): 36.4 (01 Sep 2021 11:22), Max: 36.7 (01 Sep 2021 04:13)  T(F): 97.5 (01 Sep 2021 11:22), Max: 98 (01 Sep 2021 04:13)  HR: 108 (01 Sep 2021 11:30) (90 - 108)  BP: 90/50 (01 Sep 2021 11:33) (86/50 - 112/62)  BP(mean): --  RR: 18 (01 Sep 2021 11:30) (17 - 174)  SpO2: 100% (01 Sep 2021 11:22) (95% - 100%)    CONSTITUTIONAL: NAD, well-developed  CARDIOVASCULAR: Regular rate and rhythm. Normal S1 and S2. No murmurs.  RESPIRATORY: Normal respiratory effort, Lungs CTAB  EXTREMITIES: No SHANT, peripheral pulses intact.  ABDOMEN: Nontender to palpation, normoactive bowel sounds, no rebound/guarding; No hepatosplenomegaly  MUSCLOSKELETAL: no clubbing or cyanosis of digits; no joint swelling or tenderness to palpation  PSYCH: A+O to person, place, and time; affect appropriate    LABS:                        9.8    11.58 )-----------( 128      ( 01 Sep 2021 06:17 )             33.6     09-01    133<L>  |  93<L>  |  69<H>  ----------------------------<  98  4.9   |  29  |  1.05    Ca    8.0<L>      01 Sep 2021 06:16  Mg     2.3     09-01    TPro  6.1  /  Alb  3.5  /  TBili  2.3<H>  /  DBili  1.5<H>  /  AST  61<H>  /  ALT  119<H>  /  AlkPhos  262<H>  08-31                RADIOLOGY & ADDITIONAL TESTS:  Results Reviewed:   Imaging Personally Reviewed:  Electrocardiogram Personally Reviewed:    COORDINATION OF CARE:  Care Discussed with Consultants/Other Providers [Y/N]:  Prior or Outpatient Records Reviewed [Y/N]:   PROGRESS NOTE:     Patient is a 86y old  Female who presents with a chief complaint of 85F transferred from [facility] to [facility] for acute heart failure w/ severe Mitral Regurgitation (01 Sep 2021 09:23)      SUBJECTIVE / OVERNIGHT EVENTS:  No acute events  Furosemide 80 mg IV held in PM    Complains of sore throat. No SOB or CP    ADDITIONAL REVIEW OF SYSTEMS:  No fevers, chest pain, shortness of breath, abdominal pain, lower extremity swelling or pain, dysuria, or constipation.      MEDICATIONS  (STANDING):  ascorbic acid 500 milliGRAM(s) Oral daily  dextrose 40% Gel 15 Gram(s) Oral once  dextrose 5%. 1000 milliLiter(s) (50 mL/Hr) IV Continuous <Continuous>  dextrose 5%. 1000 milliLiter(s) (100 mL/Hr) IV Continuous <Continuous>  dextrose 50% Injectable 25 Gram(s) IV Push once  dextrose 50% Injectable 12.5 Gram(s) IV Push once  dextrose 50% Injectable 25 Gram(s) IV Push once  digoxin  Injectable 125 MICROGram(s) IV Push daily  DOBUTamine Infusion 2.504 MICROgram(s)/kG/Min (3.41 mL/Hr) IV Continuous <Continuous>  enoxaparin Injectable 50 milliGRAM(s) SubCutaneous daily  furosemide   Injectable 80 milliGRAM(s) IV Push every 12 hours  glucagon  Injectable 1 milliGRAM(s) IntraMuscular once  insulin lispro (ADMELOG) corrective regimen sliding scale   SubCutaneous three times a day before meals  insulin lispro (ADMELOG) corrective regimen sliding scale   SubCutaneous at bedtime  losartan 25 milliGRAM(s) Oral daily  pantoprazole    Tablet 40 milliGRAM(s) Oral before breakfast  polyethylene glycol 3350 17 Gram(s) Oral daily  senna 2 Tablet(s) Oral at bedtime    MEDICATIONS  (PRN):  acetaminophen   Tablet .. 650 milliGRAM(s) Oral every 6 hours PRN Temp greater or equal to 38C (100.4F), Mild Pain (1 - 3)  hemorrhoidal Ointment 1 Application(s) Rectal four times a day PRN rectal irritation      CAPILLARY BLOOD GLUCOSE  151 (31 Aug 2021 21:46)      POCT Blood Glucose.: 157 mg/dL (01 Sep 2021 11:51)  POCT Blood Glucose.: 153 mg/dL (01 Sep 2021 07:33)  POCT Blood Glucose.: 171 mg/dL (31 Aug 2021 16:18)    I&O's Summary    31 Aug 2021 07:01  -  01 Sep 2021 07:00  --------------------------------------------------------  IN: 360 mL / OUT: 850 mL / NET: -490 mL    01 Sep 2021 07:01  -  01 Sep 2021 14:05  --------------------------------------------------------  IN: 120 mL / OUT: 400 mL / NET: -280 mL        PHYSICAL EXAM:  Vital Signs Last 24 Hrs  T(C): 36.4 (01 Sep 2021 11:22), Max: 36.7 (01 Sep 2021 04:13)  T(F): 97.5 (01 Sep 2021 11:22), Max: 98 (01 Sep 2021 04:13)  HR: 108 (01 Sep 2021 11:30) (90 - 108)  BP: 90/50 (01 Sep 2021 11:33) (86/50 - 112/62)  BP(mean): --  RR: 18 (01 Sep 2021 11:30) (17 - 174)  SpO2: 100% (01 Sep 2021 11:22) (95% - 100%)    CONSTITUTIONAL: NAD, well-developed  CARDIOVASCULAR: Regular rate and rhythm. Normal S1 and S2. Holosystolic 2/6 murmur radiating to back and axilla. +JVD  RESPIRATORY: Normal respiratory effort, Lungs CTAB  EXTREMITIES: Unchanged bilateral lower extremity edema to knees  ABDOMEN: Nontender to palpation, normoactive bowel sounds, no rebound/guarding; No hepatosplenomegaly  MUSCLOSKELETAL: no clubbing or cyanosis of digits; no joint swelling or tenderness to palpation  PSYCH: A+O to person and place but not time; affect appropriate    LABS:                        9.8    11.58 )-----------( 128      ( 01 Sep 2021 06:17 )             33.6     09-01    133<L>  |  93<L>  |  69<H>  ----------------------------<  98  4.9   |  29  |  1.05    Ca    8.0<L>      01 Sep 2021 06:16  Mg     2.3     09-01    TPro  6.1  /  Alb  3.5  /  TBili  2.3<H>  /  DBili  1.5<H>  /  AST  61<H>  /  ALT  119<H>  /  AlkPhos  262<H>  08-31                RADIOLOGY & ADDITIONAL TESTS:  Results Reviewed: No LFTs or lactate. Cr stable.  Imaging Personally Reviewed:  Electrocardiogram Personally Reviewed:    COORDINATION OF CARE:  Care Discussed with Consultants/Other Providers [Y/N]:  Prior or Outpatient Records Reviewed [Y/N]:

## 2021-09-02 LAB
ALBUMIN SERPL ELPH-MCNC: 2.9 G/DL — LOW (ref 3.3–5)
ALP SERPL-CCNC: 166 U/L — HIGH (ref 40–120)
ALT FLD-CCNC: 59 U/L — HIGH (ref 10–45)
ANION GAP SERPL CALC-SCNC: 10 MMOL/L — SIGNIFICANT CHANGE UP (ref 5–17)
ANION GAP SERPL CALC-SCNC: 10 MMOL/L — SIGNIFICANT CHANGE UP (ref 5–17)
AST SERPL-CCNC: 29 U/L — SIGNIFICANT CHANGE UP (ref 10–40)
BILIRUB SERPL-MCNC: 1 MG/DL — SIGNIFICANT CHANGE UP (ref 0.2–1.2)
BUN SERPL-MCNC: 54 MG/DL — HIGH (ref 7–23)
BUN SERPL-MCNC: 59 MG/DL — HIGH (ref 7–23)
CALCIUM SERPL-MCNC: 6.7 MG/DL — LOW (ref 8.4–10.5)
CALCIUM SERPL-MCNC: 7.5 MG/DL — LOW (ref 8.4–10.5)
CHLORIDE SERPL-SCNC: 94 MMOL/L — LOW (ref 96–108)
CHLORIDE SERPL-SCNC: 96 MMOL/L — SIGNIFICANT CHANGE UP (ref 96–108)
CO2 SERPL-SCNC: 32 MMOL/L — HIGH (ref 22–31)
CO2 SERPL-SCNC: 34 MMOL/L — HIGH (ref 22–31)
CREAT SERPL-MCNC: 0.85 MG/DL — SIGNIFICANT CHANGE UP (ref 0.5–1.3)
CREAT SERPL-MCNC: 0.97 MG/DL — SIGNIFICANT CHANGE UP (ref 0.5–1.3)
DIGOXIN SERPL-MCNC: 1.7 NG/ML — SIGNIFICANT CHANGE UP (ref 0.8–2)
GLUCOSE BLDC GLUCOMTR-MCNC: 111 MG/DL — HIGH (ref 70–99)
GLUCOSE BLDC GLUCOMTR-MCNC: 150 MG/DL — HIGH (ref 70–99)
GLUCOSE BLDC GLUCOMTR-MCNC: 158 MG/DL — HIGH (ref 70–99)
GLUCOSE BLDC GLUCOMTR-MCNC: 171 MG/DL — HIGH (ref 70–99)
GLUCOSE SERPL-MCNC: 138 MG/DL — HIGH (ref 70–99)
GLUCOSE SERPL-MCNC: 153 MG/DL — HIGH (ref 70–99)
HCT VFR BLD CALC: 30.1 % — LOW (ref 34.5–45)
HGB BLD-MCNC: 8.5 G/DL — LOW (ref 11.5–15.5)
LACTATE SERPL-SCNC: 1 MMOL/L — SIGNIFICANT CHANGE UP (ref 0.7–2)
MAGNESIUM SERPL-MCNC: 1.7 MG/DL — SIGNIFICANT CHANGE UP (ref 1.6–2.6)
MAGNESIUM SERPL-MCNC: 2 MG/DL — SIGNIFICANT CHANGE UP (ref 1.6–2.6)
MCHC RBC-ENTMCNC: 23.4 PG — LOW (ref 27–34)
MCHC RBC-ENTMCNC: 28.2 GM/DL — LOW (ref 32–36)
MCV RBC AUTO: 82.9 FL — SIGNIFICANT CHANGE UP (ref 80–100)
NRBC # BLD: 0 /100 WBCS — SIGNIFICANT CHANGE UP (ref 0–0)
PLATELET # BLD AUTO: 111 K/UL — LOW (ref 150–400)
POTASSIUM SERPL-MCNC: 4.2 MMOL/L — SIGNIFICANT CHANGE UP (ref 3.5–5.3)
POTASSIUM SERPL-MCNC: 4.3 MMOL/L — SIGNIFICANT CHANGE UP (ref 3.5–5.3)
POTASSIUM SERPL-SCNC: 4.2 MMOL/L — SIGNIFICANT CHANGE UP (ref 3.5–5.3)
POTASSIUM SERPL-SCNC: 4.3 MMOL/L — SIGNIFICANT CHANGE UP (ref 3.5–5.3)
PROT SERPL-MCNC: 4.9 G/DL — LOW (ref 6–8.3)
RBC # BLD: 3.63 M/UL — LOW (ref 3.8–5.2)
RBC # FLD: 17.1 % — HIGH (ref 10.3–14.5)
SODIUM SERPL-SCNC: 138 MMOL/L — SIGNIFICANT CHANGE UP (ref 135–145)
SODIUM SERPL-SCNC: 138 MMOL/L — SIGNIFICANT CHANGE UP (ref 135–145)
WBC # BLD: 8.33 K/UL — SIGNIFICANT CHANGE UP (ref 3.8–10.5)
WBC # FLD AUTO: 8.33 K/UL — SIGNIFICANT CHANGE UP (ref 3.8–10.5)

## 2021-09-02 PROCEDURE — 99233 SBSQ HOSP IP/OBS HIGH 50: CPT

## 2021-09-02 PROCEDURE — 76705 ECHO EXAM OF ABDOMEN: CPT | Mod: 26

## 2021-09-02 PROCEDURE — 93010 ELECTROCARDIOGRAM REPORT: CPT

## 2021-09-02 RX ORDER — MAGNESIUM SULFATE 500 MG/ML
2 VIAL (ML) INJECTION ONCE
Refills: 0 | Status: COMPLETED | OUTPATIENT
Start: 2021-09-02 | End: 2021-09-02

## 2021-09-02 RX ORDER — FUROSEMIDE 40 MG
20 TABLET ORAL
Qty: 500 | Refills: 0 | Status: DISCONTINUED | OUTPATIENT
Start: 2021-09-02 | End: 2021-09-07

## 2021-09-02 RX ADMIN — Medication 125 MICROGRAM(S): at 12:55

## 2021-09-02 RX ADMIN — ENOXAPARIN SODIUM 50 MILLIGRAM(S): 100 INJECTION SUBCUTANEOUS at 12:55

## 2021-09-02 RX ADMIN — Medication 50 GRAM(S): at 15:25

## 2021-09-02 RX ADMIN — Medication 500 MILLIGRAM(S): at 12:55

## 2021-09-02 RX ADMIN — LOSARTAN POTASSIUM 25 MILLIGRAM(S): 100 TABLET, FILM COATED ORAL at 06:45

## 2021-09-02 RX ADMIN — Medication 650 MILLIGRAM(S): at 15:25

## 2021-09-02 RX ADMIN — Medication 10 MG/HR: at 18:53

## 2021-09-02 RX ADMIN — PANTOPRAZOLE SODIUM 40 MILLIGRAM(S): 20 TABLET, DELAYED RELEASE ORAL at 06:45

## 2021-09-02 RX ADMIN — SPIRONOLACTONE 25 MILLIGRAM(S): 25 TABLET, FILM COATED ORAL at 06:45

## 2021-09-02 RX ADMIN — Medication 1: at 17:01

## 2021-09-02 RX ADMIN — Medication 1: at 12:49

## 2021-09-02 RX ADMIN — POLYETHYLENE GLYCOL 3350 17 GRAM(S): 17 POWDER, FOR SOLUTION ORAL at 12:55

## 2021-09-02 NOTE — PROGRESS NOTE ADULT - ASSESSMENT
Ms. Davis is a 85 year old F pmh hyperthyroid, asthma, dementia, t2dm, atrial fibrillation/flutter s/p AICD ?2015, heart failure with EF if 55% with severe tricuspid and mitral regurgitation improving on inotrope assisted diuresis with plan to increase GDMT and continue inotropic support.    #Decompensated heart failure in the setting of mitral regurgitation  -Obtain outside records from [place] re: LHC, RHC, and MILAGROS. If possible, please obtain images from MILAGROS. Appreciate discussion with primary that structural office is helping obtain images and records.  -Daily LFTs and lactate  -BID chemistry to evaluate electrolytes during diuresis  -Switch to furosemide gtt 20 mg/hr from bumex to continue diuresis while avoiding poss side effect of myalgias  -C/w losartan 25 mg daily hold for SBP < 90  -D/c dobutamine  -Follow up structural recommendations    Jt Abdullahi, PGY3

## 2021-09-02 NOTE — PROGRESS NOTE ADULT - ATTENDING COMMENTS
85 YO F with with a history of ACC/AHA Stage C NICM with improved LVEF (LV 4.9 cm, LVEF 55%) s/p CRT-D, severe mitral regurgitation being considered for MitraClip at [facility], chronic AF/AFl, and DM2 who was admitted to [facility] with ADHF and transferred to [facility] for consideration of MitraClip. She initially had JUANY and transaminitis and was initiated on dobutamine out of concern for decreased cardiac output.     At this time she is volume overloaded (but improved significantly since admission) and her mitral regurgitation appears disproportionate to her LV dysfunction and likely driving her heart failure syndrome. Will plan to diurese to euvolemia and evaluate for FIDELINA.      REVIEW OF STUDIES  TTE 8/27: LV 4.9 cm, LVEF 50-55%, severe eccentric posterior MR (appears to be due to immobile and tethered posterior mitral valve leaflet), severe biatrial enlargement, normal RV size/function, severe TR, estimated PASP 62 mmHg    EKG: underlying rhythm AF/AFl, BiV pacing  LHC 2011: minimal CAD     PLAN  - Discontinue dobutamine and continue to follow daily LFT’s/lactate.   - GDMT: continue losartan 25 mg daily and spironolactone 25 mg daily. BB held when on dobutamine and will restart when euvolemic. will increase losartan tomorrow if BP stable off   - Diuretics: switch bumex gtt to lasix gtt at 20 mg/hr due to myalgias. please check BID chemistry panels  - Device: s/p CRT with 92% BiV pacing   - AF: continue digoxin and systemic a/c, on therapeutic lovenox per cardiology. digoxin level normal  - Structural cardiology consulted for symptomatic severe MR though she is not compensated at this time. Will need results of R/LHC and MILAGROS images from [facility]  - Transaminitis/JUANY resolving with diuresis

## 2021-09-02 NOTE — CHART NOTE - NSCHARTNOTEFT_GEN_A_CORE
MEDICINE NP    AMY FERRARA  86y Female    Patient is a 86y old  Female who presents with a chief complaint of 85F transferred from [facility] to [facility] for acute heart failure w/ severe Mitral Regurgitation (01 Sep 2021 14:04)         > HPI:   (01 Sep 2021 23:10) Notified by RN, Patient with possible PMT vs AIVR on Tele @ 8PM, asymptomatic and VSS.  Patient seen at bedside, resting comfortably, denies chest pain or palpitations.      -Vital Signs Last 24 Hrs  T(C): 36.9 (01 Sep 2021 20:10), Max: 36.9 (01 Sep 2021 20:10)  T(F): 98.5 (01 Sep 2021 20:10), Max: 98.5 (01 Sep 2021 20:10)  HR: 110 (01 Sep 2021 20:10) (90 - 110)  BP: 99/58 (01 Sep 2021 20:10) (86/50 - 112/62)  BP(mean): --  RR: 18 (01 Sep 2021 20:10) (17 - 18)  SpO2: 100% (01 Sep 2021 20:10) (95% - 100%)      >LABS:  09-02    138  |  94<L>  |  59<H>  ----------------------------<  138<H>  4.3   |  34<H>  |  0.97    Ca    7.5<L>      02 Sep 2021 01:09  Mg     2.0     09-02    TPro  6.1  /  Alb  3.5  /  TBili  2.3<H>  /  DBili  1.5<H>  /  AST  61<H>  /  ALT  119<H>  /  AlkPhos  262<H>  08-31      > Assessment & Plan:   85 year old female Georgian-speaking PMH severe MV Regurgitation, sev Pulm HTN, HFrEF s/p AICD, chronic AFib, DM2, HTN, Hyperthyroidism;  Presented initially to [facility] with acute heart failure now transferred to [facility] for further management.  Admitted with Acute on chronic systolic CHF exacerbation on Dobutamine gtt, and started on Bumex gtt 9/1, PPM eval w/ normal function w/ underlying rhythm of Aflutter w/ SVR.  Now with arrythmia on Tele    1. Arrythmia - PMT vs AIVR  -Tele strip reviewed, likely PMT / NSVT 17 beats w/ underlying AFib w/ VPaced  -ECG obtained Demand Pacemaker, 118bpm bi pacing.  RBBB.  No acute changes from comparison  -Patient on 8/31 s/p AICD interrogation for NSVT w/ findings of underlying rhythm AFlutter with BiV pacing,  not PM dependent.  -STAT BMP /Lytes wnl  -C/w current regimen of Lovenox therapeutic dosing and Losartan  -F/u Cardiology  -Will endorse to day provider and attending to follow         MONISHA Soni-BC  Medicine Department  #[id number]

## 2021-09-02 NOTE — PROGRESS NOTE ADULT - SUBJECTIVE AND OBJECTIVE BOX
PROGRESS NOTE:     Patient is a 86y old  Female who presents with a chief complaint of 85F transferred from [facility] to [facility] for acute heart failure w/ severe Mitral Regurgitation (01 Sep 2021 14:04)    SUBJECTIVE / OVERNIGHT EVENTS:  No acute events  Complains of lower extremity pain over today    ADDITIONAL REVIEW OF SYSTEMS:  No fevers, chest pain, shortness of breath, abdominal pain, lower extremity swelling or pain, dysuria, or constipation.    MEDICATIONS  (STANDING):  ascorbic acid 500 milliGRAM(s) Oral daily  dextrose 40% Gel 15 Gram(s) Oral once  dextrose 5%. 1000 milliLiter(s) (50 mL/Hr) IV Continuous <Continuous>  dextrose 5%. 1000 milliLiter(s) (100 mL/Hr) IV Continuous <Continuous>  dextrose 50% Injectable 25 Gram(s) IV Push once  dextrose 50% Injectable 12.5 Gram(s) IV Push once  dextrose 50% Injectable 25 Gram(s) IV Push once  digoxin  Injectable 125 MICROGram(s) IV Push daily  enoxaparin Injectable 50 milliGRAM(s) SubCutaneous daily  furosemide Infusion 20 mG/Hr (10 mL/Hr) IV Continuous <Continuous>  glucagon  Injectable 1 milliGRAM(s) IntraMuscular once  insulin lispro (ADMELOG) corrective regimen sliding scale   SubCutaneous three times a day before meals  insulin lispro (ADMELOG) corrective regimen sliding scale   SubCutaneous at bedtime  losartan 25 milliGRAM(s) Oral daily  magnesium sulfate  IVPB 2 Gram(s) IV Intermittent once  pantoprazole    Tablet 40 milliGRAM(s) Oral before breakfast  polyethylene glycol 3350 17 Gram(s) Oral daily  senna 2 Tablet(s) Oral at bedtime  spironolactone 25 milliGRAM(s) Oral daily    MEDICATIONS  (PRN):  acetaminophen   Tablet .. 650 milliGRAM(s) Oral every 6 hours PRN Temp greater or equal to 38C (100.4F), Mild Pain (1 - 3)  benzocaine 15 mG/menthol 3.6 mG (Sugar-Free) Lozenge 1 Lozenge Oral every 2 hours PRN Sore Throat  hemorrhoidal Ointment 1 Application(s) Rectal four times a day PRN rectal irritation      CAPILLARY BLOOD GLUCOSE      POCT Blood Glucose.: 158 mg/dL (02 Sep 2021 11:57)  POCT Blood Glucose.: 111 mg/dL (02 Sep 2021 08:09)  POCT Blood Glucose.: 167 mg/dL (01 Sep 2021 20:59)  POCT Blood Glucose.: 145 mg/dL (01 Sep 2021 16:29)    I&O's Summary    01 Sep 2021 07:01  -  02 Sep 2021 07:00  --------------------------------------------------------  IN: 520.9 mL / OUT: 1800 mL / NET: -1279.1 mL    02 Sep 2021 07:01  -  02 Sep 2021 14:16  --------------------------------------------------------  IN: 260 mL / OUT: 0 mL / NET: 260 mL        PHYSICAL EXAM:  Vital Signs Last 24 Hrs  T(C): 36.8 (02 Sep 2021 11:24), Max: 36.9 (01 Sep 2021 20:10)  T(F): 98.2 (02 Sep 2021 11:24), Max: 98.5 (01 Sep 2021 20:10)  HR: 111 (02 Sep 2021 11:24) (102 - 111)  BP: 108/67 (02 Sep 2021 11:24) (94/68 - 121/71)  BP(mean): --  RR: 18 (02 Sep 2021 11:24) (18 - 18)  SpO2: 97% (02 Sep 2021 11:24) (97% - 100%)    CONSTITUTIONAL: NAD, well-developed  CARDIOVASCULAR: Irregularly, irregular. Holosystolic murmur radiating to the back.  RESPIRATORY: Decreased breath sounds at bases, but area of decreased breath sounds seems to have decreased in size.  EXTREMITIES: Improved lower extremity edema, now pitting to mid shin.  ABDOMEN: Nontender to palpation, normoactive bowel sounds, no rebound/guarding; No hepatosplenomegaly  MUSCLOSKELETAL: no clubbing or cyanosis of digits; no joint swelling or tenderness to palpation  PSYCH: A+O to person, place, and time; affect appropriate    LABS:                        8.5    8.33  )-----------( 111      ( 02 Sep 2021 10:17 )             30.1     09-02    138  |  96  |  54<H>  ----------------------------<  153<H>  4.2   |  32<H>  |  0.85    Ca    6.7<L>      02 Sep 2021 10:17  Mg     1.7     09-02    TPro  4.9<L>  /  Alb  2.9<L>  /  TBili  1.0  /  DBili  x   /  AST  29  /  ALT  59<H>  /  AlkPhos  166<H>  09-02                RADIOLOGY & ADDITIONAL TESTS:  Results Reviewed: LFTs and lactate downtrending  Imaging Personally Reviewed:  Electrocardiogram Personally Reviewed:    COORDINATION OF CARE:  Care Discussed with Consultants/Other Providers [Y/N]:  Prior or Outpatient Records Reviewed [Y/N]:

## 2021-09-03 LAB
ALBUMIN SERPL ELPH-MCNC: 2.3 G/DL — LOW (ref 3.3–5)
ALP SERPL-CCNC: 89 U/L — SIGNIFICANT CHANGE UP (ref 40–120)
ALT FLD-CCNC: 15 U/L — SIGNIFICANT CHANGE UP (ref 10–45)
ANION GAP SERPL CALC-SCNC: 9 MMOL/L — SIGNIFICANT CHANGE UP (ref 5–17)
AST SERPL-CCNC: 34 U/L — SIGNIFICANT CHANGE UP (ref 10–40)
BILIRUB SERPL-MCNC: 1.1 MG/DL — SIGNIFICANT CHANGE UP (ref 0.2–1.2)
BUN SERPL-MCNC: 19 MG/DL — SIGNIFICANT CHANGE UP (ref 7–23)
CALCIUM SERPL-MCNC: 8 MG/DL — LOW (ref 8.4–10.5)
CHLORIDE SERPL-SCNC: 103 MMOL/L — SIGNIFICANT CHANGE UP (ref 96–108)
CO2 SERPL-SCNC: 32 MMOL/L — HIGH (ref 22–31)
CREAT SERPL-MCNC: 0.8 MG/DL — SIGNIFICANT CHANGE UP (ref 0.5–1.3)
GLUCOSE BLDC GLUCOMTR-MCNC: 145 MG/DL — HIGH (ref 70–99)
GLUCOSE BLDC GLUCOMTR-MCNC: 149 MG/DL — HIGH (ref 70–99)
GLUCOSE BLDC GLUCOMTR-MCNC: 155 MG/DL — HIGH (ref 70–99)
GLUCOSE BLDC GLUCOMTR-MCNC: 165 MG/DL — HIGH (ref 70–99)
GLUCOSE SERPL-MCNC: 97 MG/DL — SIGNIFICANT CHANGE UP (ref 70–99)
HCT VFR BLD CALC: 32.6 % — LOW (ref 34.5–45)
HGB BLD-MCNC: 9.4 G/DL — LOW (ref 11.5–15.5)
MAGNESIUM SERPL-MCNC: 2 MG/DL — SIGNIFICANT CHANGE UP (ref 1.6–2.6)
MCHC RBC-ENTMCNC: 23.3 PG — LOW (ref 27–34)
MCHC RBC-ENTMCNC: 28.8 GM/DL — LOW (ref 32–36)
MCV RBC AUTO: 80.7 FL — SIGNIFICANT CHANGE UP (ref 80–100)
NRBC # BLD: 0 /100 WBCS — SIGNIFICANT CHANGE UP (ref 0–0)
PLATELET # BLD AUTO: 119 K/UL — LOW (ref 150–400)
POTASSIUM SERPL-MCNC: 4.2 MMOL/L — SIGNIFICANT CHANGE UP (ref 3.5–5.3)
POTASSIUM SERPL-SCNC: 4.2 MMOL/L — SIGNIFICANT CHANGE UP (ref 3.5–5.3)
PROT SERPL-MCNC: 5 G/DL — LOW (ref 6–8.3)
RBC # BLD: 4.04 M/UL — SIGNIFICANT CHANGE UP (ref 3.8–5.2)
RBC # FLD: 17.1 % — HIGH (ref 10.3–14.5)
SODIUM SERPL-SCNC: 144 MMOL/L — SIGNIFICANT CHANGE UP (ref 135–145)
WBC # BLD: 10.04 K/UL — SIGNIFICANT CHANGE UP (ref 3.8–10.5)
WBC # FLD AUTO: 10.04 K/UL — SIGNIFICANT CHANGE UP (ref 3.8–10.5)

## 2021-09-03 PROCEDURE — 99233 SBSQ HOSP IP/OBS HIGH 50: CPT

## 2021-09-03 PROCEDURE — 71045 X-RAY EXAM CHEST 1 VIEW: CPT | Mod: 26

## 2021-09-03 RX ADMIN — LOSARTAN POTASSIUM 25 MILLIGRAM(S): 100 TABLET, FILM COATED ORAL at 06:44

## 2021-09-03 RX ADMIN — SPIRONOLACTONE 25 MILLIGRAM(S): 25 TABLET, FILM COATED ORAL at 06:45

## 2021-09-03 RX ADMIN — POLYETHYLENE GLYCOL 3350 17 GRAM(S): 17 POWDER, FOR SOLUTION ORAL at 12:33

## 2021-09-03 RX ADMIN — Medication 650 MILLIGRAM(S): at 22:44

## 2021-09-03 RX ADMIN — Medication 650 MILLIGRAM(S): at 03:37

## 2021-09-03 RX ADMIN — Medication 500 MILLIGRAM(S): at 12:33

## 2021-09-03 RX ADMIN — Medication 650 MILLIGRAM(S): at 23:44

## 2021-09-03 RX ADMIN — Medication 1: at 11:59

## 2021-09-03 RX ADMIN — Medication 125 MICROGRAM(S): at 12:33

## 2021-09-03 RX ADMIN — SENNA PLUS 2 TABLET(S): 8.6 TABLET ORAL at 22:41

## 2021-09-03 RX ADMIN — PANTOPRAZOLE SODIUM 40 MILLIGRAM(S): 20 TABLET, DELAYED RELEASE ORAL at 06:45

## 2021-09-03 RX ADMIN — Medication 1: at 16:31

## 2021-09-03 NOTE — CHART NOTE - NSCHARTNOTEFT_GEN_A_CORE
Called by radiology and notified that PICC line in R UE PICC terminates in inferior cavoatrial junction. Discussed with Dr. Granados, who states to leave line in place for now. DO NOT USE LINE. PICC team to be contacted in the am to pull line back. Sign out given to KAILA Brannon. If patient has any arrhythmias overnight may need to pull line out. Monitor closely.

## 2021-09-03 NOTE — PROGRESS NOTE ADULT - ASSESSMENT
85 YO F with with a history of ACC/AHA Stage C NICM with improved LVEF (LV 4.9 cm, LVEF 55%) s/p CRT-D, severe mitral regurgitation being considered for MitraClip at [facility], chronic AF/AFl, and DM2 who was admitted to [facility] with ADHF and transferred to [facility] for consideration of MitraClip. She initially had JUANY and transaminitis and was initiated on dobutamine out of concern for decreased cardiac output.     At this time she is volume overloaded (but improved significantly since admission) and her mitral regurgitation appears disproportionate to her LV dysfunction and likely driving her heart failure syndrome. Will plan to diurese to euvolemia and evaluate for FIDELINA.      REVIEW OF STUDIES  TTE 8/27: LV 4.9 cm, LVEF 50-55%, severe eccentric posterior MR (appears to be due to immobile and tethered posterior mitral valve leaflet), severe biatrial enlargement, normal RV size/function, severe TR, estimated PASP 62 mmHg    EKG: underlying rhythm AF/AFl, BiV pacing  [facility] 2011: minimal CAD     PLAN  - GDMT: continue losartan 25 mg daily and spironolactone 25 mg daily. will start BB when euvolemic. will uptitrate losartan as tolerates   - Diuretics: continue lasix 20 mg/hr and give metolazone 2.5 mg one. will consider hypertonic saline tomorrow. please check BID chemistry panels  - Device: s/p CRT with 92% BiV pacing   - AF: continue digoxin and systemic a/c, on therapeutic lovenox per cardiology. digoxin level normal  - Structural cardiology consulted for symptomatic severe MR though she is not compensated at this time. Will need results of R/LHC and MILAGROS images from [facility]  - Transaminitis/JUANY resolving with diuresis .

## 2021-09-03 NOTE — PROGRESS NOTE ADULT - ATTENDING COMMENTS
No acute events reported overnight.  Patient complaining of sore throat and diffuse muscle aches/discomfort.  Is very fatigued but unchanged compared to prior day.  Agree with physical examination.  There is concern that the patient is a Cohort C individual for any percutaneous intervention.  She is markedly fatigued/weak.  When asked about her symptoms/issues she does not complain of shortness of breath or dyspnea upon exertion.  Her major issues are pain and weakness.  It is not clear that any percutaneous intervention would lead to a meaningful impact on her quality of life and longevity.  Her anatomy based upon TTE does not appear to be suitable for FIDELINA.  Trying to get outpatient records from [place] to see if a MILAGROS/catheterization was performed.  If not performed or unable to collect a MILAGROS may need to be performed if the patients entire team feels she is a suitable candidate.  In the interim she is being medically optimized from a heart failure standpoint.  Findings and plan discussed with heart failure/Dr. Lewis and structural heart team.

## 2021-09-03 NOTE — PROGRESS NOTE ADULT - SUBJECTIVE AND OBJECTIVE BOX
*****Structural Heart Team*****    Subjective:    Polish Video  used: Itzel ID# [id number]    Patient is resting in the chair, stating her throat is sore, but denies any SOB or CP. She states at the [facility], she hasn't been ambulating much because of swelling in her legs.        PAST MEDICAL & SURGICAL HISTORY:  CHF (Congestive Heart Failure)    COPD (Chronic Obstructive Pulmonary Disease)    Asthma    DM (Diabetes Mellitus)    Artificial Cardiac Pacemaker    Arrhythmia    CVA (Cerebral Vascular Accident)    Presence of cardiac defibrillator          T(C): 36.7 (09-03-21 @ 11:31), Max: 37.2 (09-02-21 @ 20:26)  HR: 82 (09-03-21 @ 11:31) (82 - 88)  BP: 130/70 (09-03-21 @ 11:31) (96/48 - 130/70)  RR: 18 (09-03-21 @ 11:31) (18 - 18)  SpO2: 100% (09-03-21 @ 11:31) (99% - 100%)  Wt(kg): --  09-02 @ 07:01  -  09-03 @ 07:00  --------------------------------------------------------  IN: 260 mL / OUT: 1075 mL / NET: -815 mL    09-03 @ 07:01  -  09-03 @ 11:47  --------------------------------------------------------  IN: 150 mL / OUT: 0 mL / NET: 150 mL      MEDICATIONS  (STANDING):  ascorbic acid 500 milliGRAM(s) Oral daily  digoxin  Injectable 125 MICROGram(s) IV Push daily  enoxaparin Injectable 50 milliGRAM(s) SubCutaneous daily  furosemide Infusion 20 mG/Hr (10 mL/Hr) IV Continuous <Continuous>  glucagon  Injectable 1 milliGRAM(s) IntraMuscular once  insulin lispro (ADMELOG) corrective regimen sliding scale   SubCutaneous three times a day before meals  insulin lispro (ADMELOG) corrective regimen sliding scale   SubCutaneous at bedtime  losartan 25 milliGRAM(s) Oral daily  pantoprazole    Tablet 40 milliGRAM(s) Oral before breakfast  polyethylene glycol 3350 17 Gram(s) Oral daily  senna 2 Tablet(s) Oral at bedtime  spironolactone 25 milliGRAM(s) Oral daily    MEDICATIONS  (PRN):  acetaminophen   Tablet .. 650 milliGRAM(s) Oral every 6 hours PRN Temp greater or equal to 38C (100.4F), Mild Pain (1 - 3)  benzocaine 15 mG/menthol 3.6 mG (Sugar-Free) Lozenge 1 Lozenge Oral every 2 hours PRN Sore Throat  hemorrhoidal Ointment 1 Application(s) Rectal four times a day PRN rectal irritation      Review of Symptoms:  Constitutional: Awake, Alert to person/place, Follows commands  Respiratory: + SOB  Cardiac: Denies CP, Denies Palpitations  Gastrointestinal: Denies Pain, Denies N/V, tolerating po intake  Vascular: Negative  Extremities: + Edema, No joint pain or swelling  Neurological: Negative  Endocrine: No heat or cold intolerance, No excessive thirst  Heme/Onc: Negative    Exam:  General: A/Ox2, NAD, Appears weak  HEENT: Supple, No JVD, Trachea midline, no masses  Pulmonary: CTAB, = Chest Excursion, no accessory muscle use  Cor: S1S2, RRR, II/VI Stefanie  ECG: VPaced  Gastrointestinal: Soft, NT/ND, + Bowel Sounds  Neuro: = motor and sensory B/L, No focal deficits  Vascular: 1+ Pulses B/L  Extremities: r hand mildly swollen, non tender  Skin: Warm/Dry/Normal color, Normal turgor, no rashes                          9.4    10.04 )-----------( 119      ( 03 Sep 2021 07:00 )             32.6   09-02    144  |  103  |  19  ----------------------------<  97  4.2   |  32<H>  |  0.80    Ca    8.0<L>      02 Sep 2021 23:45  Mg     2.0     09-02    TPro  5.0<L>  /  Alb  2.3<L>  /  TBili  1.1  /  DBili  x   /  AST  34  /  ALT  15  /  AlkPhos  89  09-02      Imaging Reviewed:     TTE:  < from: Transthoracic Echocardiogram (08.27.21 @ 08:59) >  Ejection Fraction Visual Estimate: 55 %    ------------------------------------------------------------------------  OBSERVATIONS:  Mitral Valve: Normal mitral valve. Moderate to severe,  eccentric postetriorly directed mitral regurgitation.  Aortic Root: Normal aortic root.  Aortic Valve: Aortic valve not well visualized. No elevated  gradients were noted across the valve. Mild aortic  regurgitation.  Left Atrium: Severely dilated left atrium.  LA volume index  = 95 cc/m2.  Left Ventricle: Endocardium not well visualized; grossly  low normal left ventricular systolic function.   Segmental  wall motion could not be assessed. Normal left ventricular  internal dimensions and wall thicknesses. Unable to asses  diastolic function due to technical aspects of this study.  Right Heart: Normal right atrium. Normal right ventricular  size and function.  A device lead is visualized in the  right heart. There is severe tricuspid regurgitation. There  is moderate pulmonic regurgitation.  Pericardium/PleuraNormal pericardium with no pericardial  effusion.  Hemodynamic: RA Pressure is 10 mm Hg. RV systolic pressure  is 62 mm Hg. Severe pulmonary hypertension.  ------------------------------------------------------------------------  CONCLUSIONS:  1. Moderate to severe, eccentric postetriorly directed  mitral regurgitation.  2.  Mild aortic regurgitation.  3. Severely dilated left atrium.  LA volume index = 95  cc/m2.  4. Normal left ventricular internal dimensions and wall  thicknesses.  5. Endocardium not well visualized; grossly low normal left  ventricular systolic function.   Segmental wallmotion  could not be assessed.  6. Unable to asses diastolic function due to technical  aspects of this study.  7. Normal right ventricular size and function.   A device  lead is visualized in the right heart.  8. RV systolic pressure is 62 mm Hg. Severe pulmonary  hypertension.  9. There is severe tricuspid regurgitation.    < end of copied text >        Assesment/Plan:    86 female with Moderate to Severe MR, Acute on Chronic Heart Failure  1.) Severe MR: TTE review as previously noted. We are still attempting to obtain the results of a MILAGROS and Cardiac Catheterization that she supposedly had at [facility]. If unable to obtain by Monday, will need to repeat the studies. We will also try and reach out to her outside Cardiologist to see if they have the results.  2.) Severe TR: To be re-evaluated after MR taken care of  3.) Heart Failure: Continue Lasix drip, would transition to oral formulation when able  4.) Physical Therapy     KAILA Cochran  [id number]

## 2021-09-03 NOTE — PROGRESS NOTE ADULT - SUBJECTIVE AND OBJECTIVE BOX
Subjective:  No overnight events. Continues to note diffuse pain.     Medications:  acetaminophen   Tablet .. 650 milliGRAM(s) Oral every 6 hours PRN  ascorbic acid 500 milliGRAM(s) Oral daily  benzocaine 15 mG/menthol 3.6 mG (Sugar-Free) Lozenge 1 Lozenge Oral every 2 hours PRN  dextrose 40% Gel 15 Gram(s) Oral once  dextrose 5%. 1000 milliLiter(s) IV Continuous <Continuous>  dextrose 5%. 1000 milliLiter(s) IV Continuous <Continuous>  dextrose 50% Injectable 25 Gram(s) IV Push once  dextrose 50% Injectable 12.5 Gram(s) IV Push once  dextrose 50% Injectable 25 Gram(s) IV Push once  digoxin  Injectable 125 MICROGram(s) IV Push daily  enoxaparin Injectable 50 milliGRAM(s) SubCutaneous daily  furosemide Infusion 20 mG/Hr IV Continuous <Continuous>  glucagon  Injectable 1 milliGRAM(s) IntraMuscular once  hemorrhoidal Ointment 1 Application(s) Rectal four times a day PRN  insulin lispro (ADMELOG) corrective regimen sliding scale   SubCutaneous three times a day before meals  insulin lispro (ADMELOG) corrective regimen sliding scale   SubCutaneous at bedtime  losartan 25 milliGRAM(s) Oral daily  pantoprazole    Tablet 40 milliGRAM(s) Oral before breakfast  polyethylene glycol 3350 17 Gram(s) Oral daily  senna 2 Tablet(s) Oral at bedtime  spironolactone 25 milliGRAM(s) Oral daily    Vitals:  T(C): 36.7 (21 @ 11:31), Max: 37.2 (21 @ 20:26)  HR: 82 (21 @ 11:31) (82 - 88)  BP: 130/70 (21 @ 11:31) (96/48 - 130/70)  BP(mean): --  RR: 18 (21 @ 11:31) (18 - 18)  SpO2: 100% (21 @ 11:31) (99% - 100%)    Daily     Daily Weight in k.3 (03 Sep 2021 08:08)        I&O's Summary    02 Sep 2021 07:01  -  03 Sep 2021 07:00  --------------------------------------------------------  IN: 260 mL / OUT: 1075 mL / NET: -815 mL    03 Sep 2021 07:01  -  03 Sep 2021 18:00  --------------------------------------------------------  IN: 300 mL / OUT: 360 mL / NET: -60 mL        Physical Exam:  Appearance: No Acute Distress, appears frail   HEENT: JVP 14-16 cm H20  Cardiovascular: irregularly irregular, 2/6 HSM at apex that radiates to back   Respiratory: Decreased BS at bases  Gastrointestinal: Soft, Non-tender, non-distended	  Skin: no skin lesions  Neurologic: Non-focal  Extremities: lukewarm extremities, 1+ edema   Psychiatry: A & O x 3, Mood & affect appropriate      Labs:                        9.4    10.04 )-----------( 119      ( 03 Sep 2021 07:00 )             32.6     09-02    144  |  103  |  19  ----------------------------<  97  4.2   |  32<H>  |  0.80    Ca    8.0<L>      02 Sep 2021 23:45  Mg     2.0         TPro  5.0<L>  /  Alb  2.3<L>  /  TBili  1.1  /  DBili  x   /  AST  34  /  ALT  15  /  AlkPhos  89                    Lactate, Blood: 1.0 mmol/L ( @ 10:16)

## 2021-09-04 DIAGNOSIS — R74.01 ELEVATION OF LEVELS OF LIVER TRANSAMINASE LEVELS: ICD-10-CM

## 2021-09-04 DIAGNOSIS — I50.33 ACUTE ON CHRONIC DIASTOLIC (CONGESTIVE) HEART FAILURE: ICD-10-CM

## 2021-09-04 DIAGNOSIS — I50.9 HEART FAILURE, UNSPECIFIED: ICD-10-CM

## 2021-09-04 LAB
ALBUMIN SERPL ELPH-MCNC: 3.5 G/DL — SIGNIFICANT CHANGE UP (ref 3.3–5)
ALP SERPL-CCNC: 188 U/L — HIGH (ref 40–120)
ALT FLD-CCNC: 57 U/L — HIGH (ref 10–45)
ANION GAP SERPL CALC-SCNC: 12 MMOL/L — SIGNIFICANT CHANGE UP (ref 5–17)
ANION GAP SERPL CALC-SCNC: 14 MMOL/L — SIGNIFICANT CHANGE UP (ref 5–17)
AST SERPL-CCNC: 31 U/L — SIGNIFICANT CHANGE UP (ref 10–40)
BILIRUB SERPL-MCNC: 1.3 MG/DL — HIGH (ref 0.2–1.2)
BUN SERPL-MCNC: 71 MG/DL — HIGH (ref 7–23)
BUN SERPL-MCNC: 75 MG/DL — HIGH (ref 7–23)
CALCIUM SERPL-MCNC: 8.3 MG/DL — LOW (ref 8.4–10.5)
CALCIUM SERPL-MCNC: 8.5 MG/DL — SIGNIFICANT CHANGE UP (ref 8.4–10.5)
CHLORIDE SERPL-SCNC: 93 MMOL/L — LOW (ref 96–108)
CHLORIDE SERPL-SCNC: 93 MMOL/L — LOW (ref 96–108)
CO2 SERPL-SCNC: 30 MMOL/L — SIGNIFICANT CHANGE UP (ref 22–31)
CO2 SERPL-SCNC: 31 MMOL/L — SIGNIFICANT CHANGE UP (ref 22–31)
CREAT SERPL-MCNC: 1.16 MG/DL — SIGNIFICANT CHANGE UP (ref 0.5–1.3)
CREAT SERPL-MCNC: 1.23 MG/DL — SIGNIFICANT CHANGE UP (ref 0.5–1.3)
GLUCOSE BLDC GLUCOMTR-MCNC: 145 MG/DL — HIGH (ref 70–99)
GLUCOSE BLDC GLUCOMTR-MCNC: 146 MG/DL — HIGH (ref 70–99)
GLUCOSE BLDC GLUCOMTR-MCNC: 173 MG/DL — HIGH (ref 70–99)
GLUCOSE BLDC GLUCOMTR-MCNC: 186 MG/DL — HIGH (ref 70–99)
GLUCOSE SERPL-MCNC: 161 MG/DL — HIGH (ref 70–99)
GLUCOSE SERPL-MCNC: 187 MG/DL — HIGH (ref 70–99)
HCT VFR BLD CALC: 32.8 % — LOW (ref 34.5–45)
HGB BLD-MCNC: 9.5 G/DL — LOW (ref 11.5–15.5)
MAGNESIUM SERPL-MCNC: 2.3 MG/DL — SIGNIFICANT CHANGE UP (ref 1.6–2.6)
MAGNESIUM SERPL-MCNC: 2.3 MG/DL — SIGNIFICANT CHANGE UP (ref 1.6–2.6)
MCHC RBC-ENTMCNC: 22.6 PG — LOW (ref 27–34)
MCHC RBC-ENTMCNC: 29 GM/DL — LOW (ref 32–36)
MCV RBC AUTO: 78.1 FL — LOW (ref 80–100)
NRBC # BLD: 2 /100 WBCS — HIGH (ref 0–0)
PLATELET # BLD AUTO: 137 K/UL — LOW (ref 150–400)
POTASSIUM SERPL-MCNC: 4.7 MMOL/L — SIGNIFICANT CHANGE UP (ref 3.5–5.3)
POTASSIUM SERPL-MCNC: 4.7 MMOL/L — SIGNIFICANT CHANGE UP (ref 3.5–5.3)
POTASSIUM SERPL-SCNC: 4.7 MMOL/L — SIGNIFICANT CHANGE UP (ref 3.5–5.3)
POTASSIUM SERPL-SCNC: 4.7 MMOL/L — SIGNIFICANT CHANGE UP (ref 3.5–5.3)
PROT SERPL-MCNC: 6.3 G/DL — SIGNIFICANT CHANGE UP (ref 6–8.3)
RBC # BLD: 4.2 M/UL — SIGNIFICANT CHANGE UP (ref 3.8–5.2)
RBC # FLD: 17 % — HIGH (ref 10.3–14.5)
SODIUM SERPL-SCNC: 135 MMOL/L — SIGNIFICANT CHANGE UP (ref 135–145)
SODIUM SERPL-SCNC: 138 MMOL/L — SIGNIFICANT CHANGE UP (ref 135–145)
WBC # BLD: 8.27 K/UL — SIGNIFICANT CHANGE UP (ref 3.8–10.5)
WBC # FLD AUTO: 8.27 K/UL — SIGNIFICANT CHANGE UP (ref 3.8–10.5)

## 2021-09-04 PROCEDURE — 99233 SBSQ HOSP IP/OBS HIGH 50: CPT

## 2021-09-04 PROCEDURE — 71045 X-RAY EXAM CHEST 1 VIEW: CPT | Mod: 26,76

## 2021-09-04 RX ORDER — SODIUM CHLORIDE 5 G/100ML
150 INJECTION, SOLUTION INTRAVENOUS
Refills: 0 | Status: COMPLETED | OUTPATIENT
Start: 2021-09-04 | End: 2021-09-04

## 2021-09-04 RX ADMIN — PANTOPRAZOLE SODIUM 40 MILLIGRAM(S): 20 TABLET, DELAYED RELEASE ORAL at 07:23

## 2021-09-04 RX ADMIN — Medication 500 MILLIGRAM(S): at 12:56

## 2021-09-04 RX ADMIN — SODIUM CHLORIDE 300 MILLILITER(S): 5 INJECTION, SOLUTION INTRAVENOUS at 14:37

## 2021-09-04 RX ADMIN — Medication 1: at 12:54

## 2021-09-04 RX ADMIN — LOSARTAN POTASSIUM 25 MILLIGRAM(S): 100 TABLET, FILM COATED ORAL at 07:22

## 2021-09-04 RX ADMIN — SPIRONOLACTONE 25 MILLIGRAM(S): 25 TABLET, FILM COATED ORAL at 07:22

## 2021-09-04 RX ADMIN — Medication 650 MILLIGRAM(S): at 03:37

## 2021-09-04 RX ADMIN — ENOXAPARIN SODIUM 50 MILLIGRAM(S): 100 INJECTION SUBCUTANEOUS at 12:55

## 2021-09-04 RX ADMIN — POLYETHYLENE GLYCOL 3350 17 GRAM(S): 17 POWDER, FOR SOLUTION ORAL at 12:56

## 2021-09-04 RX ADMIN — Medication 125 MICROGRAM(S): at 12:54

## 2021-09-04 RX ADMIN — SENNA PLUS 2 TABLET(S): 8.6 TABLET ORAL at 21:05

## 2021-09-04 NOTE — CHART NOTE - NSCHARTNOTEFT_GEN_A_CORE
MEDICINE NP NOTE  Spectra [id number]    Repeat CXR post Dr Granados pulled PICC line out 1 cm shows that PICC line remains in same place as per discussion with Radiology fellow.  Radiology fellow recommends that PICC line be pulled out another 2 cm and then to repeat CXR.  PICC RN pulled PICC out and awaiting repeat CXR.

## 2021-09-04 NOTE — PROGRESS NOTE ADULT - SUBJECTIVE AND OBJECTIVE BOX
Subjective:  - NAEO  - resting comfortably in the chair with no complaints other than constipation    Medications:  acetaminophen   Tablet .. 650 milliGRAM(s) Oral every 6 hours PRN  ascorbic acid 500 milliGRAM(s) Oral daily  benzocaine 15 mG/menthol 3.6 mG (Sugar-Free) Lozenge 1 Lozenge Oral every 2 hours PRN  dextrose 40% Gel 15 Gram(s) Oral once  dextrose 5%. 1000 milliLiter(s) IV Continuous <Continuous>  dextrose 5%. 1000 milliLiter(s) IV Continuous <Continuous>  dextrose 50% Injectable 25 Gram(s) IV Push once  dextrose 50% Injectable 12.5 Gram(s) IV Push once  dextrose 50% Injectable 25 Gram(s) IV Push once  digoxin  Injectable 125 MICROGram(s) IV Push daily  enoxaparin Injectable 50 milliGRAM(s) SubCutaneous daily  furosemide Infusion 20 mG/Hr IV Continuous <Continuous>  glucagon  Injectable 1 milliGRAM(s) IntraMuscular once  hemorrhoidal Ointment 1 Application(s) Rectal four times a day PRN  insulin lispro (ADMELOG) corrective regimen sliding scale   SubCutaneous three times a day before meals  insulin lispro (ADMELOG) corrective regimen sliding scale   SubCutaneous at bedtime  losartan 25 milliGRAM(s) Oral daily  pantoprazole    Tablet 40 milliGRAM(s) Oral before breakfast  polyethylene glycol 3350 17 Gram(s) Oral daily  senna 2 Tablet(s) Oral at bedtime  sodium chloride 2% . 150 milliLiter(s) IV Continuous <Continuous>  spironolactone 25 milliGRAM(s) Oral daily      ICU Vital Signs Last 24 Hrs  T(C): 36.4, Max: 36.4 ( @ 20:12)  HR: 76 (76 - 94)  BP: 132/76 (103/56 - 135/73)  BP(mean): --  ABP: --  ABP(mean): --  RR: 18 (16 - 18)  SpO2: 99% (99% - 100%)    Weight in k.2 (21)  Weight in k.3 (21)      I&O's Summary Last 24 Hrs    IN: 300 mL / OUT: 660 mL / NET: -360 mL      Tele: /AF     Physical Exam:    Appearance: No Acute Distress, appears frail   HEENT: JVP 14-16 cm H20  Cardiovascular: irregularly irregular, 2/6 HSM at apex that radiates to back   Respiratory: Decreased BS at bases  Gastrointestinal: Soft, Non-tender, non-distended	  Skin: no skin lesions  Neurologic: Non-focal  Extremities: lukewarm extremities, 1+ edema   Psychiatry: A & O x 3, Mood & affect appropriate    Labs:    ( 21 @ 08:51 )               9.5    8.27  )--------( 137                  32.8     ( 21 @ 08:51 )     135  |  93  |  71  ---------------------<  161  4.7  |  30  |  1.16    Ca 8.5  Phos x   Mg 2.3    ( 21 @ 08:51 )  TPro  6.3  /  Alb  3.5  /  TBili  1.3  /  DBili  x   /  AST  31  /  ALT  57  /  AlkPhos  188  ( 21 @ 23:45 )  TPro  5.0  /  Alb  2.3  /  TBili  1.1  /  DBili  x   /  AST  34  /  ALT  15  /  AlkPhos  89    Serum Pro-Brain Natriuretic Peptide: 3718 (21 @ 17:16)

## 2021-09-04 NOTE — PROGRESS NOTE ADULT - ASSESSMENT
87 YO F with with a history of ACC/AHA Stage C NICM with improved LVEF (LV 4.9 cm, LVEF 55%) s/p CRT-D, severe mitral regurgitation being considered for MitraClip at [facility], chronic AF/AFl, and DM2 who was admitted to [facility] with ADHF and transferred to [facility] for consideration of MitraClip. She initially had JUANY and transaminitis and was initiated on dobutamine out of concern for decreased cardiac output.     At this time she is volume overloaded (but improved significantly since admission) and her mitral regurgitation appears disproportionate to her LV dysfunction and likely driving her heart failure syndrome. Will plan to diurese to euvolemia and evaluate for FIDELINA.      REVIEW OF STUDIES  TTE 8/27: LV 4.9 cm, LVEF 50-55%, severe eccentric posterior MR (appears to be due to immobile and tethered posterior mitral valve leaflet), severe biatrial enlargement, normal RV size/function, severe TR, estimated PASP 62 mmHg    EKG: underlying rhythm AF/AFl, BiV pacing  [facility] 2011: minimal CAD   85 YO F with with a history of ACC/AHA Stage C NICM with improved LVEF (LV 4.9 cm, LVEF 55%) s/p CRT-D, severe mitral regurgitation being considered for MitraClip at [facility], chronic AF/AFl, and DM2 who was admitted to [facility] with ADHF and transferred to [facility] for consideration of MitraClip. She initially had JUANY and transaminitis and was initiated on dobutamine out of concern for decreased cardiac output.     At this time she is volume overloaded (but improved significantly since admission) and her mitral regurgitation appears disproportionate to her LV dysfunction and likely driving her heart failure syndrome. Will plan to diurese to euvolemia and evaluate for FIDELINA. Her urine output remains suboptimal     REVIEW OF STUDIES  TTE 8/27: LV 4.9 cm, LVEF 40-45%, severe eccentric posterior MR (appears to be due to immobile and tethered posterior mitral valve leaflet), severe biatrial enlargement, normal RV size/function, severe TR, estimated PASP 62 mmHg    EKG: underlying rhythm AF/AFl, BiV pacing  [facility] 2011: minimal CAD

## 2021-09-04 NOTE — PROGRESS NOTE ADULT - PROBLEM SELECTOR PLAN 2
- Structural cardiology consulted for symptomatic severe MR though she is not compensated at this time. Will need results of R/LHC and MILAGROS images from [facility]

## 2021-09-04 NOTE — PROGRESS NOTE ADULT - ATTENDING COMMENTS
UOP remains suboptimal. Will trial hypertonic saline and ensure UOP accurately documented. No signs of decreased cardiac output at this time.

## 2021-09-04 NOTE — PROGRESS NOTE ADULT - SUBJECTIVE AND OBJECTIVE BOX
DATE OF SERVICE:    Patient was seen and examined on     .Interim events noted.Consultant notes ,Labs,Telemetry reviewed by me    PRESENTING CC:    HPI and HOSPITAL COURSE: HPI:  Note patient is A&Ox1 at time of admit to [facility] and therefore history is obtained is limited to previous charting. baseline mental status also suggested dementia reporting A&Ox2 baseline at [facility]    85F Montenegrin-speaking w/ hx of severe MV regurgitation, HFrEF s/p AICD, chronic afib, DM2, HTN, hyperthyroidism presented initially to [facility] with acute heart failure now transferred to [facility] for further management. The patient was admitted initially to [facility] 8/26 with symptoms of sob and lower extremity swelling in setting of delaying mitral clip at [facility] reportedly planned 1mo prior. She required dobutamine for IV furosemide diuresis, completed TTE demonstrating severe MR, TR, pulmHTN, and grossly low normal left ventricular systolic function. She additionally was identified to have dysphagia and completed speech and swallow which identify need for dysphagia diet. Wound care RN additionally identified and were treating decubitus wounds which were present prior to hospitalization. (29 Aug 2021 23:34)      INTERIM EVENTS:      PMH -reviewed admission note, no change since admission  Heart Failure: Acute [ ] Chronic [ ] Acute on Chronic [ ] Diastolic [ ] Systolic [ ] Combined Systolic and Diastolic[ ]  JUANY[ ]  ATN[ ]  CKD I [ ] CKDII [ ] CKD III [ ] CKD IV [ ] CKD V [ ] ESRD[ ]  HTN[ ] CVA[ ] DM[ ] COPD[ ] COVID[ ] AF[ ]  PPM[ ] ICD[ ]    MEDICATIONS  (STANDING):  ascorbic acid 500 milliGRAM(s) Oral daily  dextrose 40% Gel 15 Gram(s) Oral once  dextrose 5%. 1000 milliLiter(s) (50 mL/Hr) IV Continuous <Continuous>  dextrose 5%. 1000 milliLiter(s) (100 mL/Hr) IV Continuous <Continuous>  dextrose 50% Injectable 25 Gram(s) IV Push once  dextrose 50% Injectable 12.5 Gram(s) IV Push once  dextrose 50% Injectable 25 Gram(s) IV Push once  digoxin  Injectable 125 MICROGram(s) IV Push daily  enoxaparin Injectable 50 milliGRAM(s) SubCutaneous daily  furosemide Infusion 20 mG/Hr (10 mL/Hr) IV Continuous <Continuous>  glucagon  Injectable 1 milliGRAM(s) IntraMuscular once  insulin lispro (ADMELOG) corrective regimen sliding scale   SubCutaneous three times a day before meals  insulin lispro (ADMELOG) corrective regimen sliding scale   SubCutaneous at bedtime  losartan 25 milliGRAM(s) Oral daily  pantoprazole    Tablet 40 milliGRAM(s) Oral before breakfast  polyethylene glycol 3350 17 Gram(s) Oral daily  senna 2 Tablet(s) Oral at bedtime  spironolactone 25 milliGRAM(s) Oral daily    MEDICATIONS  (PRN):  acetaminophen   Tablet .. 650 milliGRAM(s) Oral every 6 hours PRN Temp greater or equal to 38C (100.4F), Mild Pain (1 - 3)  benzocaine 15 mG/menthol 3.6 mG (Sugar-Free) Lozenge 1 Lozenge Oral every 2 hours PRN Sore Throat  hemorrhoidal Ointment 1 Application(s) Rectal four times a day PRN rectal irritation            REVIEW OF SYSTEMS:  Constitutional: [ ] fever, [ ]weight loss,  [ ]fatigue  Eyes: [ ] visual changes  Respiratory: [ ]shortness of breath;  [ ] cough, [ ]wheezing, [ ]chills, [ ]hemoptysis  Cardiovascular: [ ] chest pain, [ ]palpitations, [ ]dizziness,  [ ]leg swelling[ ]orthopnea[ ]PND  Gastrointestinal: [ ] abdominal pain, [ ]nausea, [ ]vomiting,  [ ]diarrhea [ ]Constipation [ ]Melena  Genitourinary: [ ] dysuria, [ ] hematuria [ ]Esquivel  Neurologic: [ ] headaches [ ] tremors[ ]weakness [ ]Paralysis Right[ ] Left[ ]  Skin: [ ] itching, [ ]burning, [ ] rashes  Endocrine: [ ] heat or cold intolerance  Musculoskeletal: [ ] joint pain or swelling; [ ] muscle, back, or extremity pain  Psychiatric: [ ] depression, [ ]anxiety, [ ]mood swings, or [ ]difficulty sleeping  Hematologic: [ ] easy bruising, [ ] bleeding gums    [x] All remaining systems negative except as per above.   [ ]Unable to obtain.    Vital Signs Last 24 Hrs  T(C): 36.4 (04 Sep 2021 08:24), Max: 36.7 (03 Sep 2021 11:31)  T(F): 97.5 (04 Sep 2021 08:24), Max: 98 (03 Sep 2021 11:31)  HR: 79 (04 Sep 2021 08:24) (79 - 94)  BP: 103/56 (04 Sep 2021 08:24) (103/56 - 135/73)  BP(mean): --  RR: 18 (04 Sep 2021 08:24) (16 - 18)  SpO2: 100% (04 Sep 2021 08:24) (100% - 100%)  I&O's Summary    03 Sep 2021 07:01  -  04 Sep 2021 07:00  --------------------------------------------------------  IN: 300 mL / OUT: 660 mL / NET: -360 mL        PHYSICAL EXAM:  General: No acute distress BMI-  HEENT: EOMI, PERRL  Neck: Supple, [ ] JVD  Lungs: Equal air entry bilaterally; [ ] rales [ ] wheezing [ ] rhonchi  Heart: Regular rate and rhythm; [ ] murmur   /6 [ ] systolic [ ] diastolic [ ] radiation[ ] rubs [ ]  gallops  Abdomen: Nontender, bowel sounds present  Extremities: No clubbing, cyanosis, [ ] edema [ ]Pulses  equal and intact  Nervous system:  Alert & Oriented X3, no focal deficits  Psychiatric: Normal affect  Skin: No rashes or lesions    LABS:  09-02    144  |  103  |  19  ----------------------------<  97  4.2   |  32<H>  |  0.80    Ca    8.0<L>      02 Sep 2021 23:45  Mg     2.0     09-02    TPro  5.0<L>  /  Alb  2.3<L>  /  TBili  1.1  /  DBili  x   /  AST  34  /  ALT  15  /  AlkPhos  89  09-02    Creatinine Trend: 0.80<--, 0.85<--, 0.97<--, 1.11<--, 1.05<--, 1.18<--                        9.4    10.04 )-----------( 119      ( 03 Sep 2021 07:00 )             32.6         Cardiac Enzymes:           RADIOLOGY:    ECG [my interpretation]:    TELEMETRY:Reviewed monitor tracings-    ECHO:    STRESS TEST:    CATHETERIZATION:      IMPRESSION AND PLAN:

## 2021-09-05 LAB
ALBUMIN SERPL ELPH-MCNC: 3.3 G/DL — SIGNIFICANT CHANGE UP (ref 3.3–5)
ALBUMIN SERPL ELPH-MCNC: 3.4 G/DL — SIGNIFICANT CHANGE UP (ref 3.3–5)
ALP SERPL-CCNC: 174 U/L — HIGH (ref 40–120)
ALP SERPL-CCNC: 178 U/L — HIGH (ref 40–120)
ALT FLD-CCNC: 41 U/L — SIGNIFICANT CHANGE UP (ref 10–45)
ALT FLD-CCNC: 47 U/L — HIGH (ref 10–45)
ANION GAP SERPL CALC-SCNC: 12 MMOL/L — SIGNIFICANT CHANGE UP (ref 5–17)
ANION GAP SERPL CALC-SCNC: 13 MMOL/L — SIGNIFICANT CHANGE UP (ref 5–17)
AST SERPL-CCNC: 22 U/L — SIGNIFICANT CHANGE UP (ref 10–40)
AST SERPL-CCNC: 23 U/L — SIGNIFICANT CHANGE UP (ref 10–40)
BILIRUB SERPL-MCNC: 1 MG/DL — SIGNIFICANT CHANGE UP (ref 0.2–1.2)
BILIRUB SERPL-MCNC: 1.2 MG/DL — SIGNIFICANT CHANGE UP (ref 0.2–1.2)
BUN SERPL-MCNC: 73 MG/DL — HIGH (ref 7–23)
BUN SERPL-MCNC: 74 MG/DL — HIGH (ref 7–23)
CALCIUM SERPL-MCNC: 8.4 MG/DL — SIGNIFICANT CHANGE UP (ref 8.4–10.5)
CALCIUM SERPL-MCNC: 8.6 MG/DL — SIGNIFICANT CHANGE UP (ref 8.4–10.5)
CHLORIDE SERPL-SCNC: 91 MMOL/L — LOW (ref 96–108)
CHLORIDE SERPL-SCNC: 91 MMOL/L — LOW (ref 96–108)
CO2 SERPL-SCNC: 34 MMOL/L — HIGH (ref 22–31)
CO2 SERPL-SCNC: 36 MMOL/L — HIGH (ref 22–31)
CREAT SERPL-MCNC: 1.08 MG/DL — SIGNIFICANT CHANGE UP (ref 0.5–1.3)
CREAT SERPL-MCNC: 1.18 MG/DL — SIGNIFICANT CHANGE UP (ref 0.5–1.3)
GLUCOSE BLDC GLUCOMTR-MCNC: 102 MG/DL — HIGH (ref 70–99)
GLUCOSE BLDC GLUCOMTR-MCNC: 131 MG/DL — HIGH (ref 70–99)
GLUCOSE BLDC GLUCOMTR-MCNC: 144 MG/DL — HIGH (ref 70–99)
GLUCOSE BLDC GLUCOMTR-MCNC: 162 MG/DL — HIGH (ref 70–99)
GLUCOSE SERPL-MCNC: 105 MG/DL — HIGH (ref 70–99)
GLUCOSE SERPL-MCNC: 124 MG/DL — HIGH (ref 70–99)
HCT VFR BLD CALC: 32.7 % — LOW (ref 34.5–45)
HGB BLD-MCNC: 9.3 G/DL — LOW (ref 11.5–15.5)
LACTATE SERPL-SCNC: 1.7 MMOL/L — SIGNIFICANT CHANGE UP (ref 0.7–2)
MAGNESIUM SERPL-MCNC: 2.1 MG/DL — SIGNIFICANT CHANGE UP (ref 1.6–2.6)
MAGNESIUM SERPL-MCNC: 2.1 MG/DL — SIGNIFICANT CHANGE UP (ref 1.6–2.6)
MCHC RBC-ENTMCNC: 22.9 PG — LOW (ref 27–34)
MCHC RBC-ENTMCNC: 28.4 GM/DL — LOW (ref 32–36)
MCV RBC AUTO: 80.3 FL — SIGNIFICANT CHANGE UP (ref 80–100)
NRBC # BLD: 0 /100 WBCS — SIGNIFICANT CHANGE UP (ref 0–0)
PLATELET # BLD AUTO: 144 K/UL — LOW (ref 150–400)
POTASSIUM SERPL-MCNC: 4.1 MMOL/L — SIGNIFICANT CHANGE UP (ref 3.5–5.3)
POTASSIUM SERPL-MCNC: 4.1 MMOL/L — SIGNIFICANT CHANGE UP (ref 3.5–5.3)
POTASSIUM SERPL-SCNC: 4.1 MMOL/L — SIGNIFICANT CHANGE UP (ref 3.5–5.3)
POTASSIUM SERPL-SCNC: 4.1 MMOL/L — SIGNIFICANT CHANGE UP (ref 3.5–5.3)
PROT SERPL-MCNC: 5.8 G/DL — LOW (ref 6–8.3)
PROT SERPL-MCNC: 6 G/DL — SIGNIFICANT CHANGE UP (ref 6–8.3)
RBC # BLD: 4.07 M/UL — SIGNIFICANT CHANGE UP (ref 3.8–5.2)
RBC # FLD: 17.2 % — HIGH (ref 10.3–14.5)
SODIUM SERPL-SCNC: 138 MMOL/L — SIGNIFICANT CHANGE UP (ref 135–145)
SODIUM SERPL-SCNC: 139 MMOL/L — SIGNIFICANT CHANGE UP (ref 135–145)
WBC # BLD: 8.91 K/UL — SIGNIFICANT CHANGE UP (ref 3.8–10.5)
WBC # FLD AUTO: 8.91 K/UL — SIGNIFICANT CHANGE UP (ref 3.8–10.5)

## 2021-09-05 PROCEDURE — 99233 SBSQ HOSP IP/OBS HIGH 50: CPT

## 2021-09-05 PROCEDURE — 93010 ELECTROCARDIOGRAM REPORT: CPT

## 2021-09-05 RX ADMIN — Medication 10 MG/HR: at 05:47

## 2021-09-05 RX ADMIN — POLYETHYLENE GLYCOL 3350 17 GRAM(S): 17 POWDER, FOR SOLUTION ORAL at 12:22

## 2021-09-05 RX ADMIN — SENNA PLUS 2 TABLET(S): 8.6 TABLET ORAL at 21:23

## 2021-09-05 RX ADMIN — Medication 1: at 12:22

## 2021-09-05 RX ADMIN — SPIRONOLACTONE 25 MILLIGRAM(S): 25 TABLET, FILM COATED ORAL at 05:39

## 2021-09-05 RX ADMIN — ENOXAPARIN SODIUM 50 MILLIGRAM(S): 100 INJECTION SUBCUTANEOUS at 12:22

## 2021-09-05 RX ADMIN — Medication 125 MICROGRAM(S): at 12:22

## 2021-09-05 RX ADMIN — PANTOPRAZOLE SODIUM 40 MILLIGRAM(S): 20 TABLET, DELAYED RELEASE ORAL at 05:39

## 2021-09-05 RX ADMIN — Medication 500 MILLIGRAM(S): at 12:22

## 2021-09-05 RX ADMIN — LOSARTAN POTASSIUM 25 MILLIGRAM(S): 100 TABLET, FILM COATED ORAL at 05:47

## 2021-09-05 NOTE — PROGRESS NOTE ADULT - PROBLEM SELECTOR PLAN 2
- Structural cardiology consulted for symptomatic severe MR  - Will need results of R/LHC and MILAGROS images from [facility]  - She remains decompensated at this time and concern to tolerate intervention to MR given significant frailty

## 2021-09-05 NOTE — PROGRESS NOTE ADULT - ATTENDING COMMENTS
Responded well to hypertonic saline but continues to feel poorly and is very deconditioned/frail. Would consult palliative care as she may be a reasonable candidate for hospice given continued worsening symptoms and low likliehood she will be a candidate for MitraClip. No objective signs of reduced cardiac output at this time off inotrope

## 2021-09-05 NOTE — PROGRESS NOTE ADULT - ASSESSMENT
87 YO F with with a history of ACC/AHA Stage C NICM with improved LVEF (LV 4.9 cm, LVEF 55%) s/p CRT-D, severe mitral regurgitation being considered for MitraClip at [facility], chronic AF/AFl, and DM2 who was admitted to [facility] with ADHF and transferred to [facility] for consideration of MitraClip. She initially had JUANY and transaminitis and was initiated on dobutamine out of concern for decreased cardiac output.     At this time she is volume overloaded (but improved significantly since admission) and her mitral regurgitation appears disproportionate to her LV dysfunction and likely driving her heart failure syndrome. While fluid balance is gradually improving with high dose lasix infusion and augmentation by hypertonic saline, symptomatically continues to feel very poorly. Plan was to diurese to euvolemia and evaluate for FIDELINA but likely not a candidate for MV intervention given significant frailty so would consult palliative to discuss GOC as transition to hospice would be reasonable if aligned with her and her family's wishes.        REVIEW OF STUDIES  TTE 8/27: LV 4.9 cm, LVEF 40-45%, severe eccentric posterior MR (appears to be due to immobile and tethered posterior mitral valve leaflet), severe biatrial enlargement, normal RV size/function, severe TR, estimated PASP 62 mmHg    EKG: underlying rhythm AF/AFl, BiV pacing  [facility] 2011: minimal CAD   87 YO F with with a history of ACC/AHA Stage C NICM with improved LVEF (LV 4.9 cm, LVEF 40-45%) s/p CRT-D, severe mitral regurgitation being considered for MitraClip at [facility], chronic AF/AFl, and DM2 who was admitted to [facility] with ADHF and transferred to [facility] for consideration of MitraClip. She initially had JUANY and transaminitis and was initiated on dobutamine out of concern for decreased cardiac output.     At this time she is volume overloaded (but improved significantly since admission) and her mitral regurgitation appears disproportionate to her LV dysfunction and likely driving her heart failure syndrome. While fluid balance is gradually improving with high dose lasix infusion and augmentation by hypertonic saline, symptomatically continues to feel very poorly. Plan was to diurese to euvolemia and evaluate for FIDELINA but likely not a candidate for MV intervention given significant frailty and anatomic limitations so would consult palliative to discuss GOC as transition to hospice would be reasonable if aligned with her and her family's wishes.        REVIEW OF STUDIES  TTE 8/27: LV 4.9 cm, LVEF 40-45%, severe eccentric posterior MR (appears to be due to immobile and tethered posterior mitral valve leaflet), severe biatrial enlargement, normal RV size/function, severe TR, estimated PASP 62 mmHg    EKG: underlying rhythm AF/AFl, BiV pacing  [facility] 2011: minimal CAD

## 2021-09-05 NOTE — CHART NOTE - NSCHARTNOTEFT_GEN_A_CORE
MEDICINE PA NOTE    Notified by RN of 28 beats WCT on tele. Patient seen and examined at bedside. Admits to SOB which is unchanged from baseline. Denies chest pain, palpitations, lightheadedness, dizziness. Vital signs stable. K+ 2.1 and Mg 2.1. Currently on digoxin 125mcg iv qd. As per Dr Granados, continue on current Lasix gtt and plan to transition to digoxin iv tomorrow.     KAILA Bullard  [phone]

## 2021-09-05 NOTE — PROGRESS NOTE ADULT - SUBJECTIVE AND OBJECTIVE BOX
DATE OF SERVICE:  09/05/2021  Patient was seen and examined on   09/05/2021  .Interim events noted.Consultant notes ,Labs,Telemetry reviewed by me    PRESENTING CC:Dyspnea    HPI and HOSPITAL COURSE:85F [place]an-speaking w/ hx of severe MV regurgitation, HFrEF s/p AICD, chronic afib, DM2, HTN, hyperthyroidism presented initially to [facility] with acute heart failure now transferred to [facility] for further management. The patient was admitted initially to [facility] 8/26 with symptoms of sob and lower extremity swelling in setting of delaying mitral clip at [facility] reportedly planned 1mo prior. She required dobutamine for IV furosemide diuresis, completed TTE demonstrating severe MR, TR, pulmHTN, and grossly low normal left ventricular systolic function. She additionally was identified to have dysphagia and completed speech and swallow which identify need for dysphagia diet. Wound care RN additionally identified and were treating decubitus wounds which were present prior to hospitalization. Is now off Dobutamine less dyspneac no chest pain      INTERIM EVENTS:Awakw alert       PMH -reviewed admission note, no change since admission  Heart Failure: Acute [ ] Chronic [ ] Acute on Chronic [x ] Diastolic [ ] Systolic [ ] Combined Systolic and Diastolic[ ]  JUANY[ ]  ATN[ ]  CKD I [ ] CKDII [ ] CKD III [ ] CKD IV [ ] CKD V [ ] ESRD[ ]  HTN[ ] CVA[ ] DM[ ] COPD[ ] COVID[ ] AF[ ]  PPM[ ] ICD[ ]    MEDICATIONS  (STANDING):  ascorbic acid 500 milliGRAM(s) Oral daily    digoxin  Injectable 125 MICROGram(s) IV Push daily  enoxaparin Injectable 50 milliGRAM(s) SubCutaneous daily  furosemide Infusion 20 mG/Hr (10 mL/Hr) IV Continuous <Continuous>  glucagon  Injectable 1 milliGRAM(s) IntraMuscular once  insulin lispro (ADMELOG) corrective regimen sliding scale   SubCutaneous three times a day before meals  insulin lispro (ADMELOG) corrective regimen sliding scale   SubCutaneous at bedtime  losartan 25 milliGRAM(s) Oral daily  pantoprazole    Tablet 40 milliGRAM(s) Oral before breakfast  polyethylene glycol 3350 17 Gram(s) Oral daily  senna 2 Tablet(s) Oral at bedtime  spironolactone 25 milliGRAM(s) Oral daily    MEDICATIONS  (PRN):  acetaminophen   Tablet .. 650 milliGRAM(s) Oral every 6 hours PRN Temp greater or equal to 38C (100.4F), Mild Pain (1 - 3)  benzocaine 15 mG/menthol 3.6 mG (Sugar-Free) Lozenge 1 Lozenge Oral every 2 hours PRN Sore Throat  hemorrhoidal Ointment 1 Application(s) Rectal four times a day PRN rectal irritation            REVIEW OF SYSTEMS:  Constitutional: [ ] fever, [ ]weight loss,  [x ]fatigue  Eyes: [ ] visual changes  Respiratory: [x ]shortness of breath;  [ ] cough, [ ]wheezing, [ ]chills, [ ]hemoptysis  Cardiovascular: [ ] chest pain, [ ]palpitations, [ ]dizziness,  [ ]leg swelling[ ]orthopnea[ ]PND  Gastrointestinal: [ ] abdominal pain, [ ]nausea, [ ]vomiting,  [ ]diarrhea [ ]Constipation [ ]Melena  Genitourinary: [ ] dysuria, [ ] hematuria [ ]Esquivel  Neurologic: [ ] headaches [ ] tremors[ ]weakness [ ]Paralysis Right[ ] Left[ ]  Skin: [ ] itching, [ ]burning, [ ] rashes  Endocrine: [ ] heat or cold intolerance  Musculoskeletal: [ ] joint pain or swelling; [ ] muscle, back, or extremity pain  Psychiatric: [ ] depression, [ ]anxiety, [ ]mood swings, or [ ]difficulty sleeping  Hematologic: [ ] easy bruising, [ ] bleeding gums    [x] All remaining systems negative except as per above.   [ ]Unable to obtain.    Vital Signs Last 24 Hrs  T(C): 36.8 (05 Sep 2021 04:17), Max: 36.8 (05 Sep 2021 00:08)  T(F): 98.3 (05 Sep 2021 04:17), Max: 98.3 (05 Sep 2021 04:17)  HR: 99 (05 Sep 2021 05:56) (59 - 103)  BP: 110/70 (05 Sep 2021 05:56) (95/61 - 137/73)  RR: 18 (05 Sep 2021 04:17) (18 - 18)  SpO2: 100% (05 Sep 2021 04:17) (99% - 100%)  I&O's Summary    04 Sep 2021 07:01  -  05 Sep 2021 07:00  --------------------------------------------------------  IN: 770 mL / OUT: 2600 mL / NET: -1830 mL        PHYSICAL EXAM:  General: No acute distress BMI-29  HEENT: EOMI, PERRL  Neck: Supple, [ ] JVD  Lungs: Equal air entry bilaterally; [ ] rales [ ] wheezing [ ] rhonchi  Heart: Regular rate and rhythm; [x ] murmur  3 /6 [x ] systolic [ ] diastolic [ ] radiation[ ] rubs [ ]  gallops  Abdomen: Nontender, bowel sounds present  Extremities: No clubbing, cyanosis, [x ] edema [ ]Pulses  equal and intact  Nervous system:  Alert & Oriented X3, no focal deficits  Psychiatric: Normal affect  Skin: No rashes or lesions    LABS:  09-05    138  |  91<L>  |  74<H>  ----------------------------<  124<H>  4.1   |  34<H>  |  1.18    Ca    8.4      05 Sep 2021 07:08  Mg     2.1     09-05    TPro  6.0  /  Alb  3.4  /  TBili  1.2  /  DBili  x   /  AST  23  /  ALT  47<H>  /  AlkPhos  178<H>  09-05    Creatinine Trend: 1.18<--, 1.23<--, 1.16<--, 0.80<--, 0.85<--, 0.97<--                        9.3    8.91  )-----------( 144      ( 05 Sep 2021 06:49 )             32.7         IMPRESSION AND PLAN:  85 YO F with with a history of ACC/AHA Stage C NICM with improved LVEF (LV 4.9 cm, LVEF 55%) s/p CRT-D, severe mitral regurgitation being considered for MitraClip at [facility], chronic AF/AFl, and DM2 who was admitted to [facility] with ADHF and transferred to [facility] for consideration of MitraClip    TTE 8/27: LV 4.9 cm, LVEF 40-45%, severe eccentric posterior MR (appears to be due to immobile and tethered posterior mitral valve leaflet), severe biatrial enlargement, normal RV size/function, severe TR, estimated PASP 62 mmHg    EKG: underlying rhythm AF/AFl, BiV pacing  [facility] 2011: minimal CAD      Problem/Plan - 1:  ·  Problem: Acute decompensated heart failure. HFiEF with severe MR  ·  Plan: - GDMT: continue losartan 25 mg daily and spironolactone 25 mg daily.   - Diuretics: continue lasix 20 mg/hr- Device: s/p CRT with 92% BiV pacing.    Problem/Plan - 2:  ·  Problem: Severe mitral valve regurgitation.   ·  Plan: - Structural cardiology consulted for symptomatic severe MR though she is not compensated at this time. Will need results of R/[facility] and MILAGROS images from [facility].    Problem/Plan - 3:  ·  Problem: Chronic atrial fibrillation.   ·  Plan: - continue digoxin and systemic a/c, on therapeutic lovenox per cardiology. digoxin level normal.    Problem/Plan - 4:  ·  Problem: Transaminitis.   ·  Plan: - may be due to congestion, c/w diuresis as above.    Problem/Plan - 5:  ·  Problem: JUANY (acute kidney injury).   ·  Plan: - improving with diuresis

## 2021-09-05 NOTE — PROGRESS NOTE ADULT - SUBJECTIVE AND OBJECTIVE BOX
Subjective:  - s/p administration of HTS yesterday with 24hr UOP 2.6L, net negative 1.8L  - continues to feel poorly with generalized discomfort    Medications:  acetaminophen   Tablet .. 650 milliGRAM(s) Oral every 6 hours PRN  ascorbic acid 500 milliGRAM(s) Oral daily  benzocaine 15 mG/menthol 3.6 mG (Sugar-Free) Lozenge 1 Lozenge Oral every 2 hours PRN  dextrose 40% Gel 15 Gram(s) Oral once  dextrose 5%. 1000 milliLiter(s) IV Continuous <Continuous>  dextrose 5%. 1000 milliLiter(s) IV Continuous <Continuous>  dextrose 50% Injectable 25 Gram(s) IV Push once  dextrose 50% Injectable 12.5 Gram(s) IV Push once  dextrose 50% Injectable 25 Gram(s) IV Push once  digoxin  Injectable 125 MICROGram(s) IV Push daily  enoxaparin Injectable 50 milliGRAM(s) SubCutaneous daily  furosemide Infusion 20 mG/Hr IV Continuous <Continuous>  glucagon  Injectable 1 milliGRAM(s) IntraMuscular once  hemorrhoidal Ointment 1 Application(s) Rectal four times a day PRN  insulin lispro (ADMELOG) corrective regimen sliding scale   SubCutaneous three times a day before meals  insulin lispro (ADMELOG) corrective regimen sliding scale   SubCutaneous at bedtime  losartan 25 milliGRAM(s) Oral daily  pantoprazole    Tablet 40 milliGRAM(s) Oral before breakfast  polyethylene glycol 3350 17 Gram(s) Oral daily  senna 2 Tablet(s) Oral at bedtime  spironolactone 25 milliGRAM(s) Oral daily      Physical Exam:    Vitals:  Vital Signs Last 24 Hours  T(C): 36.3 (21 @ 12:01), Max: 36.8 (21 @ 00:08)  HR: 106 (21 @ 12:01) (59 - 106)  BP: 93/66 (21 @ 12:01) (93/66 - 137/73)  RR: 18 (21 @ 12:01) (18 - 18)  SpO2: 99% (21 @ 12:01) (99% - 100%)    Weight in k.1 ( @ 08:11)    I&O's Summary    04 Sep 2021 07:01  -  05 Sep 2021 07:00  --------------------------------------------------------  IN: 770 mL / OUT: 2600 mL / NET: -1830 mL    05 Sep 2021 07:01  -  05 Sep 2021 12:17  --------------------------------------------------------  IN: 240 mL / OUT: 0 mL / NET: 240 mL    Tele: Paced 80-90s    Appearance: No Acute Distress, appears frail   HEENT: JVP 12 cm H20  Cardiovascular: irregularly irregular, 2/6 HSM at apex that radiates to back   Respiratory: Decreased BS at bases  Gastrointestinal: Soft, Non-tender, non-distended	  Skin: no skin lesions  Neurologic: Non-focal  Extremities: lukewarm extremities, 1+ edema   Psychiatry: A & O x 3, Mood & affect appropriate    Labs:                        9.3    8.91  )-----------( 144      ( 05 Sep 2021 06:49 )             32.7         138  |  91<L>  |  74<H>  ----------------------------<  124<H>  4.1   |  34<H>  |  1.18    Ca    8.4      05 Sep 2021 07:08  Mg     2.1         TPro  6.0  /  Alb  3.4  /  TBili  1.2  /  DBili  x   /  AST  23  /  ALT  47<H>  /  AlkPhos  178<H>                  Lactate, Blood: 1.7 mmol/L ( @ 06:49)

## 2021-09-06 DIAGNOSIS — R06.00 DYSPNEA, UNSPECIFIED: ICD-10-CM

## 2021-09-06 DIAGNOSIS — R53.2 FUNCTIONAL QUADRIPLEGIA: ICD-10-CM

## 2021-09-06 DIAGNOSIS — Z51.5 ENCOUNTER FOR PALLIATIVE CARE: ICD-10-CM

## 2021-09-06 DIAGNOSIS — K59.00 CONSTIPATION, UNSPECIFIED: ICD-10-CM

## 2021-09-06 LAB
ALBUMIN SERPL ELPH-MCNC: 3.2 G/DL — LOW (ref 3.3–5)
ALP SERPL-CCNC: 168 U/L — HIGH (ref 40–120)
ALT FLD-CCNC: 38 U/L — SIGNIFICANT CHANGE UP (ref 10–45)
ANION GAP SERPL CALC-SCNC: 13 MMOL/L — SIGNIFICANT CHANGE UP (ref 5–17)
ANION GAP SERPL CALC-SCNC: 9 MMOL/L — SIGNIFICANT CHANGE UP (ref 5–17)
AST SERPL-CCNC: 24 U/L — SIGNIFICANT CHANGE UP (ref 10–40)
BILIRUB SERPL-MCNC: 1 MG/DL — SIGNIFICANT CHANGE UP (ref 0.2–1.2)
BUN SERPL-MCNC: 67 MG/DL — HIGH (ref 7–23)
BUN SERPL-MCNC: 68 MG/DL — HIGH (ref 7–23)
CALCIUM SERPL-MCNC: 8.8 MG/DL — SIGNIFICANT CHANGE UP (ref 8.4–10.5)
CALCIUM SERPL-MCNC: 8.8 MG/DL — SIGNIFICANT CHANGE UP (ref 8.4–10.5)
CHLORIDE SERPL-SCNC: 90 MMOL/L — LOW (ref 96–108)
CHLORIDE SERPL-SCNC: 91 MMOL/L — LOW (ref 96–108)
CO2 SERPL-SCNC: 37 MMOL/L — HIGH (ref 22–31)
CO2 SERPL-SCNC: 39 MMOL/L — HIGH (ref 22–31)
CREAT SERPL-MCNC: 0.97 MG/DL — SIGNIFICANT CHANGE UP (ref 0.5–1.3)
CREAT SERPL-MCNC: 0.98 MG/DL — SIGNIFICANT CHANGE UP (ref 0.5–1.3)
GLUCOSE BLDC GLUCOMTR-MCNC: 116 MG/DL — HIGH (ref 70–99)
GLUCOSE BLDC GLUCOMTR-MCNC: 159 MG/DL — HIGH (ref 70–99)
GLUCOSE BLDC GLUCOMTR-MCNC: 170 MG/DL — HIGH (ref 70–99)
GLUCOSE BLDC GLUCOMTR-MCNC: 204 MG/DL — HIGH (ref 70–99)
GLUCOSE SERPL-MCNC: 104 MG/DL — HIGH (ref 70–99)
GLUCOSE SERPL-MCNC: 199 MG/DL — HIGH (ref 70–99)
HCT VFR BLD CALC: 32.4 % — LOW (ref 34.5–45)
HGB BLD-MCNC: 9.4 G/DL — LOW (ref 11.5–15.5)
MAGNESIUM SERPL-MCNC: 2 MG/DL — SIGNIFICANT CHANGE UP (ref 1.6–2.6)
MAGNESIUM SERPL-MCNC: 2 MG/DL — SIGNIFICANT CHANGE UP (ref 1.6–2.6)
MCHC RBC-ENTMCNC: 23 PG — LOW (ref 27–34)
MCHC RBC-ENTMCNC: 29 GM/DL — LOW (ref 32–36)
MCV RBC AUTO: 79.2 FL — LOW (ref 80–100)
NRBC # BLD: 0 /100 WBCS — SIGNIFICANT CHANGE UP (ref 0–0)
PHOSPHATE SERPL-MCNC: 3.4 MG/DL — SIGNIFICANT CHANGE UP (ref 2.5–4.5)
PLATELET # BLD AUTO: 164 K/UL — SIGNIFICANT CHANGE UP (ref 150–400)
POTASSIUM SERPL-MCNC: 3.6 MMOL/L — SIGNIFICANT CHANGE UP (ref 3.5–5.3)
POTASSIUM SERPL-MCNC: 4.5 MMOL/L — SIGNIFICANT CHANGE UP (ref 3.5–5.3)
POTASSIUM SERPL-SCNC: 3.6 MMOL/L — SIGNIFICANT CHANGE UP (ref 3.5–5.3)
POTASSIUM SERPL-SCNC: 4.5 MMOL/L — SIGNIFICANT CHANGE UP (ref 3.5–5.3)
PROT SERPL-MCNC: 5.6 G/DL — LOW (ref 6–8.3)
RBC # BLD: 4.09 M/UL — SIGNIFICANT CHANGE UP (ref 3.8–5.2)
RBC # FLD: 17.2 % — HIGH (ref 10.3–14.5)
SODIUM SERPL-SCNC: 139 MMOL/L — SIGNIFICANT CHANGE UP (ref 135–145)
SODIUM SERPL-SCNC: 140 MMOL/L — SIGNIFICANT CHANGE UP (ref 135–145)
WBC # BLD: 10.19 K/UL — SIGNIFICANT CHANGE UP (ref 3.8–10.5)
WBC # FLD AUTO: 10.19 K/UL — SIGNIFICANT CHANGE UP (ref 3.8–10.5)

## 2021-09-06 PROCEDURE — 99223 1ST HOSP IP/OBS HIGH 75: CPT

## 2021-09-06 RX ORDER — CHLORHEXIDINE GLUCONATE 213 G/1000ML
1 SOLUTION TOPICAL DAILY
Refills: 0 | Status: DISCONTINUED | OUTPATIENT
Start: 2021-09-06 | End: 2021-09-20

## 2021-09-06 RX ORDER — POTASSIUM CHLORIDE 20 MEQ
40 PACKET (EA) ORAL ONCE
Refills: 0 | Status: COMPLETED | OUTPATIENT
Start: 2021-09-06 | End: 2021-09-06

## 2021-09-06 RX ADMIN — ENOXAPARIN SODIUM 50 MILLIGRAM(S): 100 INJECTION SUBCUTANEOUS at 12:21

## 2021-09-06 RX ADMIN — Medication 650 MILLIGRAM(S): at 08:52

## 2021-09-06 RX ADMIN — Medication 1: at 17:00

## 2021-09-06 RX ADMIN — Medication 10 MG/HR: at 21:41

## 2021-09-06 RX ADMIN — SENNA PLUS 2 TABLET(S): 8.6 TABLET ORAL at 23:15

## 2021-09-06 RX ADMIN — Medication 500 MILLIGRAM(S): at 12:21

## 2021-09-06 RX ADMIN — LOSARTAN POTASSIUM 25 MILLIGRAM(S): 100 TABLET, FILM COATED ORAL at 05:43

## 2021-09-06 RX ADMIN — SPIRONOLACTONE 25 MILLIGRAM(S): 25 TABLET, FILM COATED ORAL at 05:43

## 2021-09-06 RX ADMIN — Medication 40 MILLIEQUIVALENT(S): at 13:49

## 2021-09-06 RX ADMIN — Medication 650 MILLIGRAM(S): at 09:52

## 2021-09-06 RX ADMIN — Medication 125 MICROGRAM(S): at 12:23

## 2021-09-06 RX ADMIN — CHLORHEXIDINE GLUCONATE 1 APPLICATION(S): 213 SOLUTION TOPICAL at 18:24

## 2021-09-06 RX ADMIN — PANTOPRAZOLE SODIUM 40 MILLIGRAM(S): 20 TABLET, DELAYED RELEASE ORAL at 05:43

## 2021-09-06 RX ADMIN — POLYETHYLENE GLYCOL 3350 17 GRAM(S): 17 POWDER, FOR SOLUTION ORAL at 12:29

## 2021-09-06 RX ADMIN — Medication 1: at 12:21

## 2021-09-06 NOTE — CONSULT NOTE ADULT - ASSESSMENT
85 YO F with with a history of ACC/AHA Stage C NICM with improved LVEF (LV 4.9 cm, LVEF 40-45%) s/p CRT-D, severe mitral regurgitation being considered for MitraClip at [facility], chronic AF/AFl, and DM2 who was admitted to [facility] with ADHF and transferred to [facility] for consideration of MitraClip. She initially had JUANY and transaminitis and was initiated on dobutamine out of concern for decreased cardiac output.   Currently on furosemide drip only. Palliative consulted for aide in symptom management and GOC.

## 2021-09-06 NOTE — CHART NOTE - NSCHARTNOTEFT_GEN_A_CORE
Medicine NP    Notified by RN pt with recurrent WCT, now with 26 beats of WCT on tele. Patient sleeping at the time, asymptomatic, denies any SOB, CP, palpitations. Vital signs stable. K+ 4.1 and Mg 2.1 on repeat lytes while on Lasix gtt. Currently on digoxin 125mcg iv qd.  Dr Granados, notified of previous WCT earlier today. Informed day PA to continue on current Lasix gtt and plan to transition to digoxin p.o. tomorrow.   Reordered BMP and Mg for this AM  c/w Digoxin  Pt still with labile BP to tolerate additional beta blocker while on diuretic gtt.   Will discuss for day provider during sign out.    Payton Blackwood, HEATH  x [phone] Medicine NP    Notified by RN pt with recurrent WCT, now with 26 beats of WCT on tele. Patient sleeping at the time, asymptomatic, denies any SOB, CP, palpitations. Vital signs stable. K+ 4.1 and Mg 2.1 on repeat lytes while on Lasix gtt. Currently on digoxin 125mcg iv qd.  Dr Granados, notified of previous WCT earlier today. Informed day PA to continue on current Lasix gtt and plan to transition to digoxin p.o. tomorrow.     Vital Signs Last 24 Hrs  T(C): 36.4 (06 Sep 2021 01:46), Max: 36.8 (05 Sep 2021 04:17)  T(F): 97.5 (06 Sep 2021 01:46), Max: 98.3 (05 Sep 2021 04:17)  HR: 60 (06 Sep 2021 01:46) (58 - 106)  BP: 96/60 (06 Sep 2021 01:46) (93/66 - 112/74)  BP(mean): --  RR: 18 (06 Sep 2021 01:46) (18 - 18)  SpO2: 100% (06 Sep 2021 01:46) (99% - 100%)  BP: 96/60 (09-06-21 @ 01:46) (93/66 - 137/73)    Magnesium, Serum (09.05.21 @ 18:15)   Magnesium, Serum: 2.1 mg/dL Sodium, Serum: 139 mmol/L   Potassium, Serum: 4.1 mmol/L   Chloride, Serum: 91 mmol/L   Carbon Dioxide, Serum: 36 mmol/L   Anion Gap, Serum: 12 mmol/L   Blood Urea Nitrogen, Serum: 73 mg/dL   Creatinine, Serum: 1.08 mg/dL   Glucose, Serum: 105 mg/dL   Calcium, Total Serum: 8.6 mg/dL   Protein Total, Serum: 5.8 g/dL   Albumin, Serum: 3.3 g/dL   Bilirubin Total, Serum: 1.0 mg/dL   Alkaline Phosphatase, Serum: 174 U/L   Aspartate Aminotransferase (AST/SGOT): 22 U/L   Alanine Aminotransferase (ALT/SGPT): 41 U/L   eGFR if Non : 46: Interpretative comment   The units for eGFR are mL/min/1.73M2 (normalized body surface area). The   eGFR is calculated from a serum creatinine using the CKD-EPI equation.   Other variables required for calculation are race, age and sex. Among   patients with chronic kidney disease (CKD), the eGFR is useful in   determining the stage of disease according to KDOQI CKD classification.   All eGFR results are reported numerically with the following   interpretation.         Reordered BMP, Phos, and Mg for this AM  c/w Digoxin  Pt still with labile BP to tolerate additional beta blocker while on diuretic gtt.   Will discuss for day provider during sign out.    Payton Blackwood, HEATH  x [phone]

## 2021-09-06 NOTE — PROGRESS NOTE ADULT - SUBJECTIVE AND OBJECTIVE BOX
DATE OF SERVICE:  09/06/2021  Patient was seen and examined on   09/06/2021  .Interim events noted.Consultant notes ,Labs,Telemetry reviewed by me    PRESENTING CC:ADHF    HPI and HOSPITAL COURSE:85F [place]an-speaking w/ hx of severe MV regurgitation, HFrEF s/p AICD, chronic afib, DM2, HTN, hyperthyroidism presented initially to [facility] with acute heart failure now transferred to [facility] for further management.       INTERIM EVENTS:Awake not in any distress leg edema has improved remains frail and c/o weakness      PMH -reviewed admission note, no change since admission  Heart Failure: Acute [ ] Chronic [x ] Acute on Chronic [x ] Diastolic [ ] Systolic [ ] Combined Systolic and Diastolic[ ]  JUANY[ ]  ATN[ ]  CKD I [ ] CKDII [ ] CKD III [ ] CKD IV [ ] CKD V [ ] ESRD[ ]  HTN[ ] CVA[ ] DM[ ] COPD[ ] COVID[ ] AF[ ]  PPM[ ] ICD[x ]    MEDICATIONS  (STANDING):  ascorbic acid 500 milliGRAM(s) Oral daily  digoxin  Injectable 125 MICROGram(s) IV Push daily  enoxaparin Injectable 50 milliGRAM(s) SubCutaneous daily  furosemide Infusion 20 mG/Hr (10 mL/Hr) IV Continuous <Continuous>  glucagon  Injectable 1 milliGRAM(s) IntraMuscular once  insulin lispro (ADMELOG) corrective regimen sliding scale   SubCutaneous three times a day before meals  insulin lispro (ADMELOG) corrective regimen sliding scale   SubCutaneous at bedtime  losartan 25 milliGRAM(s) Oral daily  pantoprazole    Tablet 40 milliGRAM(s) Oral before breakfast  polyethylene glycol 3350 17 Gram(s) Oral daily  senna 2 Tablet(s) Oral at bedtime  spironolactone 25 milliGRAM(s) Oral daily    MEDICATIONS  (PRN):  acetaminophen   Tablet .. 650 milliGRAM(s) Oral every 6 hours PRN Temp greater or equal to 38C (100.4F), Mild Pain (1 - 3)  benzocaine 15 mG/menthol 3.6 mG (Sugar-Free) Lozenge 1 Lozenge Oral every 2 hours PRN Sore Throat  hemorrhoidal Ointment 1 Application(s) Rectal four times a day PRN rectal irritation            REVIEW OF SYSTEMS:  Constitutional: [ ] fever, [ ]weight loss,  [x ]fatigue  Eyes: [ ] visual changes  Respiratory: [ ]shortness of breath;  [ ] cough, [ ]wheezing, [ ]chills, [ ]hemoptysis  Cardiovascular: [ ] chest pain, [ ]palpitations, [ ]dizziness,  [ ]leg swelling[ ]orthopnea[ ]PND  Gastrointestinal: [ ] abdominal pain, [ ]nausea, [ ]vomiting,  [ ]diarrhea [ ]Constipation [ ]Melena  Genitourinary: [ ] dysuria, [ ] hematuria [ ]Esquivel  Neurologic: [ ] headaches [ ] tremors[ ]weakness [ ]Paralysis Right[ ] Left[ ]  Skin: [ ] itching, [ ]burning, [ ] rashes  Endocrine: [ ] heat or cold intolerance  Musculoskeletal: [ ] joint pain or swelling; [ ] muscle, back, or extremity pain  Psychiatric: [ ] depression, [ ]anxiety, [ ]mood swings, or [ ]difficulty sleeping  Hematologic: [ ] easy bruising, [ ] bleeding gums    [x] All remaining systems negative except as per above.   [ ]Unable to obtain.    Vital Signs Last 24 Hrs  T(C): 36.4 (06 Sep 2021 04:22), Max: 36.4 (05 Sep 2021 08:11)  T(F): 97.6 (06 Sep 2021 04:22), Max: 97.6 (05 Sep 2021 16:44)  HR: 72 (06 Sep 2021 05:22) (58 - 106)  BP: 118/72 (06 Sep 2021 05:22) (93/66 - 118/72)  RR: 18 (06 Sep 2021 04:22) (18 - 18)  SpO2: 98% (06 Sep 2021 04:22) (98% - 100%)  I&O's Summary    04 Sep 2021 07:01  -  05 Sep 2021 07:00  --------------------------------------------------------  IN: 770 mL / OUT: 2600 mL / NET: -1830 mL    05 Sep 2021 07:01  -  06 Sep 2021 06:41  --------------------------------------------------------  IN: 660 mL / OUT: 700 mL / NET: -40 mL        PHYSICAL EXAM:  General: No acute distress BMI-21  HEENT: EOMI, PERRL  Neck: Supple, [x ] JVD  Lungs: Equal air entry bilaterally; [x ] rales [ ] wheezing [ ] rhonchi  Heart: Regular rate and rhythm; [x ] murmur  3 /6 [x ] systolic [ ] diastolic [ ] radiation[ ] rubs [ ]  gallops  Abdomen: Nontender, bowel sounds present  Extremities: No clubbing, cyanosis, [x ] edema [ ]Pulses  equal and intact  Nervous system:  Alert & Oriented X3, no focal deficits  Psychiatric: Normal affect  Skin: No rashes or lesions    LABS:  09-05    139  |  91<L>  |  73<H>  ----------------------------<  105<H>  4.1   |  36<H>  |  1.08    Ca    8.6      05 Sep 2021 18:15  Mg     2.1     09-05    TPro  5.8<L>  /  Alb  3.3  /  TBili  1.0  /  DBili  x   /  AST  22  /  ALT  41  /  AlkPhos  174<H>  09-05    Creatinine Trend: 1.08<--, 1.18<--, 1.23<--, 1.16<--, 0.80<--, 0.85<--                        9.3    8.91  )-----------( 144      ( 05 Sep 2021 06:49 )             32.7        TELEMETRY:Reviewed monitor tracings-AV paced  NSVT      IMPRESSION AND PLAN:  87 YO F with with a history of ACC/AHA Stage C NICM with improved LVEF (LV 4.9 cm, LVEF 40-45%) s/p CRT-D, severe mitral regurgitation being considered for MitraClip at [facility], chronic AF/AFl, and DM2 who was admitted to [facility] with ADHF and transferred to [facility] for consideration of MitraClip. She initially had JUANY and transaminitis and was initiated on dobutamine out of concern for decreased cardiac output. Dobutamine discontinued and now on Furosemide gtt is symptomatically better albeit remains fluid overloaded,discussed with Advanced HF team aree that her overall prognosis is poor .She is not a candidate for Structural heart intervention     Problem/Plan - 1:  ·  Problem: Acute decompensated heart failure.   ·  Plan: - GDMT: continue losartan 25 mg daily and spironolactone 25 mg daily. Will start BB when euvolemic and uptitrate losartan as tolerates   - Diuretics: continue lasix 20 mg/hr. Will defer on further doses of HST for now, last received 9/4 with appropriate response   - please check BID chemistry panels  - Device: s/p CRT with 92% BiV pacing  - palliative care consult pending.    Problem/Plan - 2:  ·  Problem: Severe mitral valve regurgitation.   ·  Plan: - Structural cardiology consulted for symptomatic severe MR  - Will need results of R/LHC and MILAGROS images from [facility]  - She remains decompensated at this time and concern to tolerate intervention to MR given significant frailty.    Problem/Plan - 3:  ·  Problem: Chronic atrial fibrillation.   ·  Plan: - continue digoxin and systemic a/c, on therapeutic lovenox per cardiology. digoxin level normal.    Problem/Plan - 4:  ·  Problem: Transaminitis.   ·  Plan: - may be due to congestion  - improving with diureses.    Problem/Plan - 5:  ·  Problem: JAUNY (acute kidney injury).   ·  Plan: - resolved with diuresis.

## 2021-09-06 NOTE — CONSULT NOTE ADULT - PROBLEM SELECTOR RECOMMENDATION 9
Patient currently not symptomatic   Tolerating lasix gtt well   continue per cardiology mx of optimization of HF meds  consider Dilaudid 0.2 mg IVP if symptomatic dyspnea occurs

## 2021-09-07 LAB
ALBUMIN SERPL ELPH-MCNC: 3.4 G/DL — SIGNIFICANT CHANGE UP (ref 3.3–5)
ALP SERPL-CCNC: 153 U/L — HIGH (ref 40–120)
ALT FLD-CCNC: 35 U/L — SIGNIFICANT CHANGE UP (ref 10–45)
ANION GAP SERPL CALC-SCNC: 10 MMOL/L — SIGNIFICANT CHANGE UP (ref 5–17)
APPEARANCE UR: ABNORMAL
AST SERPL-CCNC: 22 U/L — SIGNIFICANT CHANGE UP (ref 10–40)
BACTERIA # UR AUTO: ABNORMAL
BILIRUB SERPL-MCNC: 1 MG/DL — SIGNIFICANT CHANGE UP (ref 0.2–1.2)
BILIRUB UR-MCNC: NEGATIVE — SIGNIFICANT CHANGE UP
BUN SERPL-MCNC: 60 MG/DL — HIGH (ref 7–23)
CALCIUM SERPL-MCNC: 9 MG/DL — SIGNIFICANT CHANGE UP (ref 8.4–10.5)
CHLORIDE SERPL-SCNC: 92 MMOL/L — LOW (ref 96–108)
CO2 SERPL-SCNC: 39 MMOL/L — HIGH (ref 22–31)
COLOR SPEC: ABNORMAL
COMMENT - URINE: SIGNIFICANT CHANGE UP
CREAT SERPL-MCNC: 0.94 MG/DL — SIGNIFICANT CHANGE UP (ref 0.5–1.3)
DIFF PNL FLD: ABNORMAL
EPI CELLS # UR: 6 /HPF — HIGH
GLUCOSE BLDC GLUCOMTR-MCNC: 156 MG/DL — HIGH (ref 70–99)
GLUCOSE BLDC GLUCOMTR-MCNC: 179 MG/DL — HIGH (ref 70–99)
GLUCOSE BLDC GLUCOMTR-MCNC: 188 MG/DL — HIGH (ref 70–99)
GLUCOSE BLDC GLUCOMTR-MCNC: 98 MG/DL — SIGNIFICANT CHANGE UP (ref 70–99)
GLUCOSE SERPL-MCNC: 125 MG/DL — HIGH (ref 70–99)
GLUCOSE UR QL: NEGATIVE — SIGNIFICANT CHANGE UP
HYALINE CASTS # UR AUTO: 4 /LPF — HIGH (ref 0–2)
KETONES UR-MCNC: NEGATIVE — SIGNIFICANT CHANGE UP
LEUKOCYTE ESTERASE UR-ACNC: ABNORMAL
MAGNESIUM SERPL-MCNC: 2 MG/DL — SIGNIFICANT CHANGE UP (ref 1.6–2.6)
NITRITE UR-MCNC: NEGATIVE — SIGNIFICANT CHANGE UP
PH UR: 8 — SIGNIFICANT CHANGE UP (ref 5–8)
POTASSIUM SERPL-MCNC: 4.1 MMOL/L — SIGNIFICANT CHANGE UP (ref 3.5–5.3)
POTASSIUM SERPL-SCNC: 4.1 MMOL/L — SIGNIFICANT CHANGE UP (ref 3.5–5.3)
PROT SERPL-MCNC: 5.8 G/DL — LOW (ref 6–8.3)
PROT UR-MCNC: ABNORMAL
RBC CASTS # UR COMP ASSIST: 3 /HPF — SIGNIFICANT CHANGE UP (ref 0–4)
SODIUM SERPL-SCNC: 141 MMOL/L — SIGNIFICANT CHANGE UP (ref 135–145)
SP GR SPEC: 1.01 — SIGNIFICANT CHANGE UP (ref 1.01–1.02)
UROBILINOGEN FLD QL: ABNORMAL
WBC UR QL: 427 /HPF — HIGH (ref 0–5)

## 2021-09-07 PROCEDURE — 99233 SBSQ HOSP IP/OBS HIGH 50: CPT

## 2021-09-07 RX ORDER — NYSTATIN CREAM 100000 [USP'U]/G
1 CREAM TOPICAL
Refills: 0 | Status: DISCONTINUED | OUTPATIENT
Start: 2021-09-07 | End: 2021-09-20

## 2021-09-07 RX ORDER — FUROSEMIDE 40 MG
40 TABLET ORAL
Refills: 0 | Status: DISCONTINUED | OUTPATIENT
Start: 2021-09-07 | End: 2021-09-08

## 2021-09-07 RX ADMIN — Medication 650 MILLIGRAM(S): at 20:21

## 2021-09-07 RX ADMIN — PANTOPRAZOLE SODIUM 40 MILLIGRAM(S): 20 TABLET, DELAYED RELEASE ORAL at 05:25

## 2021-09-07 RX ADMIN — Medication 500 MILLIGRAM(S): at 11:37

## 2021-09-07 RX ADMIN — Medication 1: at 12:30

## 2021-09-07 RX ADMIN — Medication 40 MILLIGRAM(S): at 18:05

## 2021-09-07 RX ADMIN — SPIRONOLACTONE 25 MILLIGRAM(S): 25 TABLET, FILM COATED ORAL at 05:24

## 2021-09-07 RX ADMIN — SENNA PLUS 2 TABLET(S): 8.6 TABLET ORAL at 22:06

## 2021-09-07 RX ADMIN — Medication 650 MILLIGRAM(S): at 20:51

## 2021-09-07 RX ADMIN — ENOXAPARIN SODIUM 50 MILLIGRAM(S): 100 INJECTION SUBCUTANEOUS at 11:37

## 2021-09-07 RX ADMIN — Medication 650 MILLIGRAM(S): at 09:46

## 2021-09-07 RX ADMIN — Medication 650 MILLIGRAM(S): at 08:46

## 2021-09-07 RX ADMIN — POLYETHYLENE GLYCOL 3350 17 GRAM(S): 17 POWDER, FOR SOLUTION ORAL at 12:53

## 2021-09-07 RX ADMIN — Medication 125 MICROGRAM(S): at 11:37

## 2021-09-07 RX ADMIN — CHLORHEXIDINE GLUCONATE 1 APPLICATION(S): 213 SOLUTION TOPICAL at 12:50

## 2021-09-07 RX ADMIN — NYSTATIN CREAM 1 APPLICATION(S): 100000 CREAM TOPICAL at 18:05

## 2021-09-07 RX ADMIN — LOSARTAN POTASSIUM 25 MILLIGRAM(S): 100 TABLET, FILM COATED ORAL at 05:25

## 2021-09-07 RX ADMIN — Medication 1: at 08:15

## 2021-09-07 NOTE — PROGRESS NOTE ADULT - ASSESSMENT
85 YO F with with a history of ACC/AHA Stage C NICM with improved LVEF (LV 4.9 cm, LVEF 40-45%) s/p CRT-D, severe mitral regurgitation being considered for MitraClip at [facility], chronic AF/AFl, and DM2 who was admitted to [facility] with ADHF and transferred to [facility] for consideration of MitraClip. She initially had JUANY and transaminitis and was initiated on dobutamine out of concern for decreased cardiac output.     At this time she is volume overloaded (but improved significantly since admission) and her mitral regurgitation appears disproportionate to her LV dysfunction and likely driving her heart failure syndrome. While fluid balance is gradually improving with high dose lasix infusion and augmentation by hypertonic saline, symptomatically continues to feel very poorly. Plan was to diurese to euvolemia and evaluate for FIDELINA but likely not a candidate for MV intervention given significant frailty and anatomic limitations so palliative consulted to discuss GOC as transition to hospice would be reasonable if aligned with her and her family's wishes.        REVIEW OF STUDIES  TTE 8/27: LV 4.9 cm, LVEF 40-45%, severe eccentric posterior MR (appears to be due to immobile and tethered posterior mitral valve leaflet), severe biatrial enlargement, normal RV size/function, severe TR, estimated PASP 62 mmHg    EKG: underlying rhythm AF/AFl, BiV pacing  [facility] 2011: minimal CAD

## 2021-09-07 NOTE — PROGRESS NOTE ADULT - ATTENDING COMMENTS
Volume status improving significantly and she feels better. Will transition lasix drip to pushes and plan to switch to PO in next 1-3 days. Palliative care assistance with discussing hospice and goals of care.

## 2021-09-07 NOTE — PROGRESS NOTE ADULT - PROBLEM SELECTOR PLAN 4
- may be due to congestion  - improving with diureses - due to congestion and resolved with diuresis

## 2021-09-07 NOTE — PROGRESS NOTE ADULT - PROBLEM SELECTOR PLAN 2
- Structural cardiology consulted for symptomatic severe MR  - Will need results of R/LHC and MILAGROS images from [facility]  - She remains decompensated at this time and concern to tolerate intervention to MR given significant frailty  - Will discuss with structural definitive candidacy for intervention to MR to better guide GOC discussions with palliative - Structural cardiology consulted for symptomatic severe MR, though in discussions with the team - the anatomic limitations of her mitral valve plus significant frailty would make her a poor candidate for any intervention

## 2021-09-07 NOTE — PROGRESS NOTE ADULT - SUBJECTIVE AND OBJECTIVE BOX
Subjective:  - endorsing L heel tenderness and constipation  - 6 beats NSVT yesterday evening, asymptomatic  - 24hr UOP 2.5L on Lasix infusion       Medications:  acetaminophen   Tablet .. 650 milliGRAM(s) Oral every 6 hours PRN  ascorbic acid 500 milliGRAM(s) Oral daily  benzocaine 15 mG/menthol 3.6 mG (Sugar-Free) Lozenge 1 Lozenge Oral every 2 hours PRN  chlorhexidine 4% Liquid 1 Application(s) Topical daily  dextrose 40% Gel 15 Gram(s) Oral once  dextrose 5%. 1000 milliLiter(s) IV Continuous <Continuous>  dextrose 5%. 1000 milliLiter(s) IV Continuous <Continuous>  dextrose 50% Injectable 25 Gram(s) IV Push once  dextrose 50% Injectable 12.5 Gram(s) IV Push once  dextrose 50% Injectable 25 Gram(s) IV Push once  digoxin  Injectable 125 MICROGram(s) IV Push daily  enoxaparin Injectable 50 milliGRAM(s) SubCutaneous daily  furosemide   Injectable 40 milliGRAM(s) IV Push two times a day  glucagon  Injectable 1 milliGRAM(s) IntraMuscular once  hemorrhoidal Ointment 1 Application(s) Rectal four times a day PRN  insulin lispro (ADMELOG) corrective regimen sliding scale   SubCutaneous three times a day before meals  insulin lispro (ADMELOG) corrective regimen sliding scale   SubCutaneous at bedtime  losartan 25 milliGRAM(s) Oral daily  pantoprazole    Tablet 40 milliGRAM(s) Oral before breakfast  polyethylene glycol 3350 17 Gram(s) Oral daily  senna 2 Tablet(s) Oral at bedtime  spironolactone 25 milliGRAM(s) Oral daily      Physical Exam:    Vitals:  Vital Signs Last 24 Hours  T(C): 36.6 (09-07-21 @ 11:16), Max: 36.7 (09-06-21 @ 18:30)  HR: 85 (09-07-21 @ 11:16) (70 - 88)  BP: 99/59 (09-07-21 @ 11:16) (99/59 - 121/80)  RR: 18 (09-07-21 @ 11:16) (18 - 18)  SpO2: 95% (09-07-21 @ 11:16) (95% - 100%)    I&O's Summary    06 Sep 2021 07:01  -  07 Sep 2021 07:00  --------------------------------------------------------  IN: 450 mL / OUT: 2500 mL / NET: -2050 mL    Tele: Paced / AF 70-100s    Appearance: No Acute Distress, appears frail   HEENT: JVP mild-mod elevated; improving  Cardiovascular: irregularly irregular, 2/6 HSM at apex that radiates to back   Respiratory: Decreased BS at bases  Gastrointestinal: Soft, Non-tender, non-distended	  Skin: no skin lesions  Neurologic: Non-focal  Extremities: Warm peripherally. Resolved LE swelling  Psychiatry: A & O x 3, Mood & affect appropriate    Labs:                        9.4    10.19 )-----------( 164      ( 06 Sep 2021 07:14 )             32.4     09-07    141  |  92<L>  |  60<H>  ----------------------------<  125<H>  4.1   |  39<H>  |  0.94    Ca    9.0      07 Sep 2021 07:05  Phos  3.4     09-06  Mg     2.0     09-07    TPro  5.8<L>  /  Alb  3.4  /  TBili  1.0  /  DBili  x   /  AST  22  /  ALT  35  /  AlkPhos  153<H>  09-07                Lactate, Blood: 1.7 mmol/L (09-05 @ 06:49)

## 2021-09-08 LAB
ANION GAP SERPL CALC-SCNC: 16 MMOL/L — SIGNIFICANT CHANGE UP (ref 5–17)
BUN SERPL-MCNC: 56 MG/DL — HIGH (ref 7–23)
CALCIUM SERPL-MCNC: 9.6 MG/DL — SIGNIFICANT CHANGE UP (ref 8.4–10.5)
CHLORIDE SERPL-SCNC: 90 MMOL/L — LOW (ref 96–108)
CO2 SERPL-SCNC: 36 MMOL/L — HIGH (ref 22–31)
CREAT SERPL-MCNC: 0.91 MG/DL — SIGNIFICANT CHANGE UP (ref 0.5–1.3)
GLUCOSE BLDC GLUCOMTR-MCNC: 127 MG/DL — HIGH (ref 70–99)
GLUCOSE BLDC GLUCOMTR-MCNC: 135 MG/DL — HIGH (ref 70–99)
GLUCOSE BLDC GLUCOMTR-MCNC: 155 MG/DL — HIGH (ref 70–99)
GLUCOSE BLDC GLUCOMTR-MCNC: 168 MG/DL — HIGH (ref 70–99)
GLUCOSE SERPL-MCNC: 129 MG/DL — HIGH (ref 70–99)
MAGNESIUM SERPL-MCNC: 2 MG/DL — SIGNIFICANT CHANGE UP (ref 1.6–2.6)
POTASSIUM SERPL-MCNC: 4.2 MMOL/L — SIGNIFICANT CHANGE UP (ref 3.5–5.3)
POTASSIUM SERPL-SCNC: 4.2 MMOL/L — SIGNIFICANT CHANGE UP (ref 3.5–5.3)
SODIUM SERPL-SCNC: 142 MMOL/L — SIGNIFICANT CHANGE UP (ref 135–145)

## 2021-09-08 PROCEDURE — 99233 SBSQ HOSP IP/OBS HIGH 50: CPT

## 2021-09-08 PROCEDURE — 99231 SBSQ HOSP IP/OBS SF/LOW 25: CPT

## 2021-09-08 RX ORDER — CARVEDILOL PHOSPHATE 80 MG/1
3.12 CAPSULE, EXTENDED RELEASE ORAL EVERY 12 HOURS
Refills: 0 | Status: DISCONTINUED | OUTPATIENT
Start: 2021-09-08 | End: 2021-09-20

## 2021-09-08 RX ORDER — SACUBITRIL AND VALSARTAN 24; 26 MG/1; MG/1
1 TABLET, FILM COATED ORAL
Refills: 0 | Status: DISCONTINUED | OUTPATIENT
Start: 2021-09-08 | End: 2021-09-20

## 2021-09-08 RX ORDER — FUROSEMIDE 40 MG
40 TABLET ORAL DAILY
Refills: 0 | Status: DISCONTINUED | OUTPATIENT
Start: 2021-09-09 | End: 2021-09-20

## 2021-09-08 RX ORDER — DIGOXIN 250 MCG
125 TABLET ORAL DAILY
Refills: 0 | Status: DISCONTINUED | OUTPATIENT
Start: 2021-09-08 | End: 2021-09-20

## 2021-09-08 RX ORDER — APIXABAN 2.5 MG/1
2.5 TABLET, FILM COATED ORAL
Refills: 0 | Status: DISCONTINUED | OUTPATIENT
Start: 2021-09-08 | End: 2021-09-20

## 2021-09-08 RX ADMIN — NYSTATIN CREAM 1 APPLICATION(S): 100000 CREAM TOPICAL at 16:53

## 2021-09-08 RX ADMIN — POLYETHYLENE GLYCOL 3350 17 GRAM(S): 17 POWDER, FOR SOLUTION ORAL at 11:51

## 2021-09-08 RX ADMIN — SPIRONOLACTONE 25 MILLIGRAM(S): 25 TABLET, FILM COATED ORAL at 05:14

## 2021-09-08 RX ADMIN — CHLORHEXIDINE GLUCONATE 1 APPLICATION(S): 213 SOLUTION TOPICAL at 11:21

## 2021-09-08 RX ADMIN — NYSTATIN CREAM 1 APPLICATION(S): 100000 CREAM TOPICAL at 05:15

## 2021-09-08 RX ADMIN — SACUBITRIL AND VALSARTAN 1 TABLET(S): 24; 26 TABLET, FILM COATED ORAL at 16:53

## 2021-09-08 RX ADMIN — APIXABAN 2.5 MILLIGRAM(S): 2.5 TABLET, FILM COATED ORAL at 16:52

## 2021-09-08 RX ADMIN — Medication 125 MICROGRAM(S): at 13:12

## 2021-09-08 RX ADMIN — Medication 1: at 11:51

## 2021-09-08 RX ADMIN — Medication 650 MILLIGRAM(S): at 05:13

## 2021-09-08 RX ADMIN — Medication 500 MILLIGRAM(S): at 11:59

## 2021-09-08 RX ADMIN — Medication 1 TABLET(S): at 16:52

## 2021-09-08 RX ADMIN — LOSARTAN POTASSIUM 25 MILLIGRAM(S): 100 TABLET, FILM COATED ORAL at 05:14

## 2021-09-08 RX ADMIN — PANTOPRAZOLE SODIUM 40 MILLIGRAM(S): 20 TABLET, DELAYED RELEASE ORAL at 05:14

## 2021-09-08 RX ADMIN — SENNA PLUS 2 TABLET(S): 8.6 TABLET ORAL at 21:32

## 2021-09-08 NOTE — DIETITIAN NUTRITION RISK NOTIFICATION - TREATMENT: THE FOLLOWING DIET HAS BEEN RECOMMENDED
Diet, Dysphagia 2 Mechanical Soft-Thin Liquids:   Consistent Carbohydrate {Evening Snack} (CSTCHOSN)  Low Sodium  Don(7 Gm Arginine/7 Gm Glut/1.2 Gm HMB     Qty per Day:  2 (08-31-21 @ 16:18) [Active]

## 2021-09-08 NOTE — PROGRESS NOTE ADULT - SUBJECTIVE AND OBJECTIVE BOX
DATE OF SERVICE:  09/08/2021  Patient was seen and examined on  09/08/2021   .Interim events noted.Consultant notes ,Labs,Telemetry reviewed by me    PRESENTING CC:ADHF    HPI and HOSPITAL COURSE: HPI:  85F Occitan-speaking w/ hx of severe MV regurgitation, HFrEF s/p AICD, chronic afib, DM2, HTN, hyperthyroidism presented initially to [facility] with acute heart failure now transferred to [facility] for further management.     INTERIM EVENTS:Awake alert still refers to suprapubic discomfort-HF follow up noted,Foleys removed,discussed with Tanner Shaw concerning Ms Davis condition plan for Home with home care if adequate hours available.Off Furosemide gtt      PMH -reviewed admission note, no change since admission  Heart Failure: Acute [ ] Chronic [ ] Acute on Chronic [x ] Diastolic [x ] Systolic [ ] Combined Systolic and Diastolic[ ]  JUANY[ ]  ATN[ ]  CKD I [ ] CKDII [ ] CKD III [ ] CKD IV [ ] CKD V [ ] ESRD[ ]  HTN[x ] CVA[ ] DM[ ] COPD[ ] COVID[ ] AF[ ]  PPM[ ] ICD[x ]    MEDICATIONS  (STANDING):  ascorbic acid 500 milliGRAM(s) Oral daily  chlorhexidine 4% Liquid 1 Application(s) Topical daily  dextrose 50% Injectable 25 Gram(s) IV Push once  digoxin  Injectable 125 MICROGram(s) IV Push daily  enoxaparin Injectable 50 milliGRAM(s) SubCutaneous daily  furosemide   Injectable 40 milliGRAM(s) IV Push two times a day  glucagon  Injectable 1 milliGRAM(s) IntraMuscular once  insulin lispro (ADMELOG) corrective regimen sliding scale   SubCutaneous three times a day before meals  insulin lispro (ADMELOG) corrective regimen sliding scale   SubCutaneous at bedtime  losartan 25 milliGRAM(s) Oral daily  nystatin Powder 1 Application(s) Topical two times a day  pantoprazole    Tablet 40 milliGRAM(s) Oral before breakfast  polyethylene glycol 3350 17 Gram(s) Oral daily  senna 2 Tablet(s) Oral at bedtime  spironolactone 25 milliGRAM(s) Oral daily    MEDICATIONS  (PRN):  acetaminophen   Tablet .. 650 milliGRAM(s) Oral every 6 hours PRN Temp greater or equal to 38C (100.4F), Mild Pain (1 - 3)  benzocaine 15 mG/menthol 3.6 mG (Sugar-Free) Lozenge 1 Lozenge Oral every 2 hours PRN Sore Throat  hemorrhoidal Ointment 1 Application(s) Rectal four times a day PRN rectal irritation            REVIEW OF SYSTEMS:  Constitutional: [ ] fever, [ ]weight loss,  [x ]fatigue  Eyes: [ ] visual changes  Respiratory: [x ]shortness of breath;  [ ] cough, [ ]wheezing, [ ]chills, [ ]hemoptysis  Cardiovascular: [ ] chest pain, [ ]palpitations, [ ]dizziness,  [x ]leg swelling[ ]orthopnea[ ]PND  Gastrointestinal: [ ] abdominal pain, [ ]nausea, [ ]vomiting,  [ ]diarrhea [ ]Constipation [ ]Melena  Genitourinary: [ ] dysuria, [ ] hematuria [ ]Esquivel  Neurologic: [ ] headaches [ ] tremors[ ]weakness [ ]Paralysis Right[ ] Left[ ]  Skin: [ ] itching, [ ]burning, [ ] rashes  Endocrine: [ ] heat or cold intolerance  Musculoskeletal: [ ] joint pain or swelling; [ ] muscle, back, or extremity pain  Psychiatric: [ ] depression, [ ]anxiety, [ ]mood swings, or [ ]difficulty sleeping  Hematologic: [ ] easy bruising, [ ] bleeding gums    [x] All remaining systems negative except as per above.   [ ]Unable to obtain.    Vital Signs Last 24 Hrs  T(C): 36.4 (08 Sep 2021 04:12), Max: 36.7 (07 Sep 2021 15:59)  T(F): 97.5 (08 Sep 2021 04:12), Max: 98 (07 Sep 2021 15:59)  HR: 97 (08 Sep 2021 04:12) (78 - 97)  BP: 110/62 (08 Sep 2021 04:12) (99/59 - 117/79)  RR: 18 (08 Sep 2021 04:12) (17 - 18)  SpO2: 93% (08 Sep 2021 04:12) (93% - 96%)  I&O's Summary    06 Sep 2021 07:01  -  07 Sep 2021 07:00  --------------------------------------------------------  IN: 450 mL / OUT: 2500 mL / NET: -2050 mL    07 Sep 2021 07:01  -  08 Sep 2021 06:55  --------------------------------------------------------  IN: 600 mL / OUT: 1600 mL / NET: -1000 mL      PHYSICAL EXAM:  General: No acute distress BMI-24  HEENT: EOMI, PERRL  Neck: Supple, [x ] JVD  Lungs: Equal air entry bilaterally; [ ] rales [ ] wheezing [x ] rhonchi  Heart: Regular rate and rhythm; [x ] murmur   3/6 [x ] systolic [ ] diastolic [ ] radiation[ ] rubs [ ]  gallops  Abdomen: Nontender, bowel sounds present  Extremities: No clubbing, cyanosis, [v ] edema [ ]Pulses  equal and intact  Nervous system:  Alert & Oriented X3, no focal deficits  Psychiatric: Normal affect  Skin: No rashes or lesions    LABS:  09-07    141  |  92<L>  |  60<H>  ----------------------------<  125<H>  4.1   |  39<H>  |  0.94    Ca    9.0      07 Sep 2021 07:05  Phos  3.4     09-06  Mg     2.0     09-07    TPro  5.8<L>  /  Alb  3.4  /  TBili  1.0  /  DBili  x   /  AST  22  /  ALT  35  /  AlkPhos  153<H>  09-07    Creatinine Trend: 0.94<--, 0.98<--, 0.97<--, 1.08<--, 1.18<--, 1.23<--                        9.4    10.19 )-----------( 164      ( 06 Sep 2021 07:14 )             32.4         RADIOLOGY:  XR CHEST PORTABLE URGENT 1V        09/04/2021   FINDINGS/  IMPRESSION:  Left cardiac device again noted. right PIC line noted tip overlying junction of SVC/right atrial region. No pneumothorax.  Bilateral pleural effusion/opacities without significant change  Heart size cannot be accurately assessed in this projection, but appear enlarged.      TELEMETRY:Reviewed monitor tracings-BiV paced rhythm     ECHO:Study Date: 8/27/2021  CONCLUSIONS:  1. Moderate to severe, eccentric postetriorly directed mitral regurgitation.  2.  Mild aortic regurgitation.  3. Severely dilated left atrium.  LA volume index = 95 cc/m2.  4. Normal left ventricular internal dimensions and wall thicknesses.  5. Endocardium not well visualized; grossly low normal left ventricular systolic function.   Segmental wallmotion could not be assessed.EF 55%  6. Unable to asses diastolic function due to technical aspects of this study.  7. Normal right ventricular size and function.   A device lead is visualized in the right heart.  8. RV systolic pressure is 62 mm Hg. Severe pulmonary hypertension.  9. There is severe tricuspid regurgitation.      IMPRESSION AND PLAN:  87 YO F with with a history of ACC/AHA Stage C NICM with improved LVEF (LV 4.9 cm, LVEF 40-45%) s/p CRT-D, severe mitral regurgitation being considered for MitraClip at [facility], chronic AF/AFl, and DM2 who was admitted to [facility] with ADHF and transferred to [facility] for consideration of MitraClip    Problem/Plan - 1:  ·  Problem: Acute decompensated heart failure.   ·  Plan: - GDMT: continue losartan 25 mg daily and spironolactone 25 mg daily. Will start BB when euvolemic and uptitrate losartan as tolerates   - Device: s/p CRT with 92% BiV pacing  -Discussed GOC with tanner Shaw explained Ms Ryan overall frail condition,albeit improved LV EF her condition and prognosis is poor-considering discharge to Home with Home care.  -On Furosemide Bolus IVP.Andrew and Losartan  -Will initiate BBlocker therapy    Problem/Plan - 2:  ·  Problem: Severe mitral valve regurgitation.   ·  Plan: - Structural cardiology consulted for symptomatic severe MR, though in discussions with the team - the anatomic limitations of her mitral valve plus significant frailty would make her a poor candidate for any intervention.    Problem/Plan - 3:  ·  Problem: Chronic atrial fibrillation.   ·  Plan: - continue digoxin and systemic a/c, on therapeutic lovenox per cardiology. digoxin level normal.  -Resume Eliquis      Problem/Plan - 4:  ·  Problem: Transaminitis.   ·  Plan: - due to congestion and resolved with diuresis.    Problem/Plan - 5:  ·  Problem: JUANY (acute kidney injury).   ·  Plan: - resolved with diuresis.

## 2021-09-08 NOTE — PROGRESS NOTE ADULT - SUBJECTIVE AND OBJECTIVE BOX
Subjective:  - she was reporting dysuria/pain r/t chairez catheter, which was subsequently removed  - denies complaints this morning    Medications:  acetaminophen   Tablet .. 650 milliGRAM(s) Oral every 6 hours PRN  apixaban 2.5 milliGRAM(s) Oral two times a day  ascorbic acid 500 milliGRAM(s) Oral daily  benzocaine 15 mG/menthol 3.6 mG (Sugar-Free) Lozenge 1 Lozenge Oral every 2 hours PRN  carvedilol 3.125 milliGRAM(s) Oral every 12 hours  chlorhexidine 4% Liquid 1 Application(s) Topical daily  dextrose 40% Gel 15 Gram(s) Oral once  dextrose 5%. 1000 milliLiter(s) IV Continuous <Continuous>  dextrose 5%. 1000 milliLiter(s) IV Continuous <Continuous>  dextrose 50% Injectable 25 Gram(s) IV Push once  dextrose 50% Injectable 12.5 Gram(s) IV Push once  dextrose 50% Injectable 25 Gram(s) IV Push once  digoxin     Tablet 125 MICROGram(s) Oral daily  furosemide   Injectable 40 milliGRAM(s) IV Push two times a day  glucagon  Injectable 1 milliGRAM(s) IntraMuscular once  hemorrhoidal Ointment 1 Application(s) Rectal four times a day PRN  insulin lispro (ADMELOG) corrective regimen sliding scale   SubCutaneous three times a day before meals  insulin lispro (ADMELOG) corrective regimen sliding scale   SubCutaneous at bedtime  losartan 25 milliGRAM(s) Oral daily  nystatin Powder 1 Application(s) Topical two times a day  pantoprazole    Tablet 40 milliGRAM(s) Oral before breakfast  polyethylene glycol 3350 17 Gram(s) Oral daily  senna 2 Tablet(s) Oral at bedtime  spironolactone 25 milliGRAM(s) Oral daily      Physical Exam:    Vitals:  Vital Signs Last 24 Hours  T(C): 36.5 (21 @ 08:06), Max: 36.7 (21 @ 15:59)  HR: 92 (21 @ 08:06) (78 - 97)  BP: 122/73 (21 @ 08:06) (100/62 - 122/73)  RR: 18 (21 @ 08:06) (17 - 18)  SpO2: 95% (21 @ 08:06) (93% - 96%)    Weight in k ( @ 09:33)    I&O's Summary    07 Sep 2021 07:01  -  08 Sep 2021 07:00  --------------------------------------------------------  IN: 600 mL / OUT: 1600 mL / NET: -1000 mL    08 Sep 2021 07:  -  08 Sep 2021 14:11  --------------------------------------------------------  IN: 240 mL / OUT: 250 mL / NET: -10 mL    Tele: vpaced    General: Frail. No distress. Comfortable.  HEENT: EOM intact.  Neck: Neck supple. JVP 10cm H2O. No masses  Chest: Clear to auscultation bilaterally  CV: Regular, Normal S1 and S2. II/VI SM. Radial pulses normal. No LE edema  Abdomen: Soft, non-distended, non-tender  Skin: No rashes or skin breakdown  Neurology: Alert and oriented times three. Sensation intact  Psych: Affect normal    Labs:        142  |  90<L>  |  56<H>  ----------------------------<  129<H>  4.2   |  36<H>  |  0.91    Ca    9.6      08 Sep 2021 07:11  Mg     2.0         TPro  5.8<L>  /  Alb  3.4  /  TBili  1.0  /  DBili  x   /  AST  22  /  ALT  35  /  AlkPhos  153<H>

## 2021-09-08 NOTE — PROGRESS NOTE ADULT - ASSESSMENT
87 YO F with with a history of ACC/AHA Stage C NICM with improved LVEF (LV 4.9 cm, LVEF 40-45%) s/p CRT-D, severe mitral regurgitation being considered for MitraClip at [facility], chronic AF/AFl, and DM2 who was admitted to [facility] with ADHF and transferred to [facility] for consideration of MitraClip. She initially had JUANY and transaminitis and was initiated on dobutamine out of concern for decreased cardiac output.     She currently appears euvolemic on exam. She is tolerating low dose neurohromonal antagonists. Her mitral regurgitation appears disproportionate to her LV dysfunction and likely driving her heart failure syndrome. Plan was to diurese to euvolemia and evaluate for FIDELINA but likely not a candidate for MV intervention given significant frailty and anatomic limitations so palliative consulted to discuss GOC as transition to hospice would be reasonable if aligned with her and her family's wishes.      REVIEW OF STUDIES  TTE 8/27: LV 4.9 cm, LVEF 40-45%, severe eccentric posterior MR (appears to be due to immobile and tethered posterior mitral valve leaflet), severe biatrial enlargement, normal RV size/function, severe TR, estimated PASP 62 mmHg    EKG: underlying rhythm AF/AFl, BiV pacing  [facility] 2011: minimal CAD

## 2021-09-08 NOTE — PROGRESS NOTE ADULT - SUBJECTIVE AND OBJECTIVE BOX
*****Structural Heart Team*****    Subjective:  Patient in a chair, c/o pain at [facility]. Denies SOB or CP at this time.      PAST MEDICAL & SURGICAL HISTORY:  CHF (Congestive Heart Failure)    COPD (Chronic Obstructive Pulmonary Disease)    Asthma    DM (Diabetes Mellitus)    Artificial Cardiac Pacemaker    Arrhythmia    CVA (Cerebral Vascular Accident)    Presence of cardiac defibrillator          T(C): 36.5 (09-08-21 @ 08:06), Max: 36.7 (09-07-21 @ 15:59)  HR: 92 (09-08-21 @ 08:06) (78 - 97)  BP: 122/73 (09-08-21 @ 08:06) (100/62 - 122/73)  RR: 18 (09-08-21 @ 08:06) (17 - 18)  SpO2: 95% (09-08-21 @ 08:06) (93% - 96%)  Wt(kg): --  09-07 @ 07:01  -  09-08 @ 07:00  --------------------------------------------------------  IN: 600 mL / OUT: 1600 mL / NET: -1000 mL    09-08 @ 07:01  -  09-08 @ 11:26  --------------------------------------------------------  IN: 240 mL / OUT: 150 mL / NET: 90 mL      MEDICATIONS  (STANDING):  apixaban 2.5 milliGRAM(s) Oral two times a day  ascorbic acid 500 milliGRAM(s) Oral daily  carvedilol 3.125 milliGRAM(s) Oral every 12 hours  chlorhexidine 4% Liquid 1 Application(s) Topical daily  dextrose 40% Gel 15 Gram(s) Oral once  dextrose 5%. 1000 milliLiter(s) (50 mL/Hr) IV Continuous <Continuous>  dextrose 5%. 1000 milliLiter(s) (100 mL/Hr) IV Continuous <Continuous>  dextrose 50% Injectable 25 Gram(s) IV Push once  dextrose 50% Injectable 12.5 Gram(s) IV Push once  dextrose 50% Injectable 25 Gram(s) IV Push once  digoxin     Tablet 125 MICROGram(s) Oral daily  furosemide   Injectable 40 milliGRAM(s) IV Push two times a day  glucagon  Injectable 1 milliGRAM(s) IntraMuscular once  insulin lispro (ADMELOG) corrective regimen sliding scale   SubCutaneous three times a day before meals  insulin lispro (ADMELOG) corrective regimen sliding scale   SubCutaneous at bedtime  losartan 25 milliGRAM(s) Oral daily  nystatin Powder 1 Application(s) Topical two times a day  pantoprazole    Tablet 40 milliGRAM(s) Oral before breakfast  polyethylene glycol 3350 17 Gram(s) Oral daily  senna 2 Tablet(s) Oral at bedtime  spironolactone 25 milliGRAM(s) Oral daily    MEDICATIONS  (PRN):  acetaminophen   Tablet .. 650 milliGRAM(s) Oral every 6 hours PRN Temp greater or equal to 38C (100.4F), Mild Pain (1 - 3)  benzocaine 15 mG/menthol 3.6 mG (Sugar-Free) Lozenge 1 Lozenge Oral every 2 hours PRN Sore Throat  hemorrhoidal Ointment 1 Application(s) Rectal four times a day PRN rectal irritation      Review of Symptoms:  Constitutional: Awake, Alert, Follows commands  Respiratory: Intermittent  Cardiac: Denies CP, Denies Palpitations  Gastrointestinal: Denies Pain, Denies N/V, tolerating po intake  Vascular: Negative  Extremities: + Edema, No joint pain or swelling  Neurological: Negative  Endocrine: No heat or cold intolerance, No excessive thirst  Heme/Onc: Negative    Exam:  General: A/Ox3, Weak,  HEENT: Supple, No JVD, Trachea midline, no masses  Pulmonary: CTAB, = Chest Excursion, no accessory muscle use  Cor: S1S2, RRR, II/VI SARAVANAN  ECG: Paced  Gastrointestinal: Soft, NT/ND, + Bowel Sounds  Neuro: = motor and sensory B/L, No focal deficits  Vascular: 1+ pulses B/L  Extremities: No joint pain or swelling  Skin: Warm/Dry/Normal color, Normal turgor, no rashes    09-08    142  |  90<L>  |  56<H>  ----------------------------<  129<H>  4.2   |  36<H>  |  0.91    Ca    9.6      08 Sep 2021 07:11  Mg     2.0     09-08    TPro  5.8<L>  /  Alb  3.4  /  TBili  1.0  /  DBili  x   /  AST  22  /  ALT  35  /  AlkPhos  153<H>  09-07      Imaging Reviewed:    Post Op TTE:      Assesment/Plan:    86 Serbian speaking Female with Severe Mitral Regurgitation, Acute on Chronic Heart Failure    1.) Severe MR: We were asked to evaluate for possible Mitral FIDELINA. The patient appears frail and deconditioned, and at baseline is not a very active person. With her diminished functional status, she would likely derive no benefit from Mitral FIDELINA. Agree with Palliative/GOC consult. We will sign off for now, but will remain available should we be needed to re-evaluate the patient.      KAILA Cochran  [id number]

## 2021-09-08 NOTE — CHART NOTE - NSCHARTNOTEFT_GEN_A_CORE
Pt c/o increased catheter pain at the insertion site. No hematuria. No trauma. +cloudy, yellow urine in catheter. Esquivel placed prior to admission at [facility]. UA grossly positive. Esquivel catheter discontinued secondary to pain.  Will obtain urine culture from next void (Clean catch), if unable to void, will obtain via straight cath.     ~Haylie Gil, ANP-C

## 2021-09-08 NOTE — PROGRESS NOTE ADULT - ATTENDING COMMENTS
Much improved and nearing euvolemia. Will switch losartan to low dose entresto and transition IV to PO diuretics. Obtain TTE to obtain baseline optimized cardiac function and MR assessment though not a candidate for FIDELINA. Disposition planning in 1-2 days. Given age, sacral decubitus ulcers, recurrent admissions, and MR not amenable to intervention we have consulted palliative care.

## 2021-09-08 NOTE — CHART NOTE - NSCHARTNOTEFT_GEN_A_CORE
Nutrition Follow Up Note  Patient seen for: nutrition consult/follow up.    Chart reviewed, events noted. Pt is a "85 year old female Swedish-speaking PMH severe MV regurgitation, sev pulm HTN, HFrEF s/p AICD, chronic afib, DM2, HTN, hyperthyroidism presented initially to [facility] with acute heart failure now transferred to [facility] for further management."    Source: [x] Patient       [x] EMR        [] RN        [x] Family at bedside       [] Other:    -If unable to interview patient: [] Trach/Vent/BiPAP  [] Disoriented/confused/inappropriate to interview    Diet Order:   Diet, Dysphagia 2 Mechanical Soft-Thin Liquids:   Consistent Carbohydrate {Evening Snack} (CSTCHOSN)  Low Sodium  Don(7 Gm Arginine/7 Gm Glut/1.2 Gm HMB     Qty per Day:  2 (21)    - PO intake :   [] >75%  Adequate    [x] 50-75%  Fair       [x] <50%  Poor    - Nutrition-related concerns:    GI:  Last BM .   Bowel Regimen? [x] Yes   [] No    Weights:   Daily Weight in k (), Weight in k.1 (), Weight in k.1 (), Weight in k.2 (), Weight in k.3 (), Weight in k.6 ()  wt trending down, pt being diuresed   Recalculated BMI (based on lowest wt in house): 18.4    Nutritionally Pertinent MEDICATIONS  (STANDING):  ascorbic acid  carvedilol  dextrose 40% Gel  dextrose 5%.  dextrose 5%.  dextrose 50% Injectable  dextrose 50% Injectable  dextrose 50% Injectable  digoxin     Tablet  furosemide   Injectable  glucagon  Injectable  insulin lispro (ADMELOG) corrective regimen sliding scale  insulin lispro (ADMELOG) corrective regimen sliding scale  losartan  pantoprazole    Tablet  polyethylene glycol 3350  senna  spironolactone    Pertinent Labs:  @ 07:11: Na 142, BUN 56<H>, Cr 0.91, <H>, K+ 4.2, Phos --, Mg 2.0, Alk Phos --, ALT/SGPT --, AST/SGOT --, HbA1c --    A1C with Estimated Average Glucose Result: 7.4 % (21 @ 09:36)    Finger Sticks:  POCT Blood Glucose.: 135 mg/dL ( @ 07:52)  POCT Blood Glucose.: 156 mg/dL ( @ 21:04)  POCT Blood Glucose.: 98 mg/dL ( @ 16:13)  POCT Blood Glucose.: 179 mg/dL ( @ 11:31)    Skin per nursing documentation: suspected DTI to sacrum  Edema: 2+ bilateral arm    Estimated Needs:   [x] no change since previous assessment  [] recalculated:     Previous Nutrition Diagnosis: Increased Nutrient Needs  Nutrition Diagnosis is: [x] ongoing  [] resolved [] not applicable     New Nutrition Diagnosis: [] Not applicable    Nutrition Care Plan:  [x] In Progress  [] Achieved  [] Not applicable    Nutrition Interventions:     Education Provided:       [] Yes:  [] No:     Recommendations:          Monitoring and Evaluation:   Continue to monitor nutritional intake, tolerance to diet prescription, weights, labs, skin integrity    RD remains available upon request and will follow up per protocol    Shirley Bran MS, RD, CDN Pager# [phone] Nutrition Follow Up Note  Patient seen for: nutrition consult/follow up.    Chart reviewed, events noted. Pt is a "85 year old female Lithuanian-speaking PMH severe MV regurgitation, sev pulm HTN, HFrEF s/p AICD, chronic afib, DM2, HTN, hyperthyroidism presented initially to [facility] with acute heart failure now transferred to [facility] for further management."    Source: [x] Patient       [x] EMR        [x] RN        [] Family at bedside       [x] Other: Niece/caregiver via phone call    -If unable to interview patient: [] Trach/Vent/BiPAP  [x] Disoriented/confused/inappropriate to interview    Diet Order:   Diet, Dysphagia 2 Mechanical Soft-Thin Liquids:   Consistent Carbohydrate {Evening Snack} (CSTCHOSN)  Low Sodium  Don(7 Gm Arginine/7 Gm Glut/1.2 Gm HMB     Qty per Day:  2 (21)    - PO intake :   [] >75%  Adequate    [x] 50-75%  Fair       [x] <50%  Poor    - Nutrition-related concerns:    - Per niece, pt with decreased PO intake for past 1-2 months due to hospitalizations, prior to this had good PO intake. Niece reports weight loss over this time, states pt's -130lb. Noted most recent wt after diuresis of 105.8lb suggesting significant wt loss in recent months - likely a combination of true weight and fluid (16% wt loss x 1-2 months).    - Niece concerned regarding pt's PO intake this admission. Food preferences provided - RD to honor as feasible. Niece additionally interested in providing oral nutrition supplements to pt - per niece pt has had Ensure and Glucerna in the past with good acceptance.     GI:  Last BM .   Bowel Regimen? [x] Yes   [] No    Weights:   Daily Weight in k (), Weight in k.1 (), Weight in k.1 (), Weight in k.2 (), Weight in k.3 (), Weight in k.6 ()  wt trending down, pt being diuresed   Recalculated BMI (based on lowest wt in house): 18.4    Nutritionally Pertinent MEDICATIONS  (STANDING):  ascorbic acid  carvedilol  dextrose 40% Gel  dextrose 5%.  dextrose 5%.  dextrose 50% Injectable  dextrose 50% Injectable  dextrose 50% Injectable  digoxin     Tablet  furosemide   Injectable  glucagon  Injectable  insulin lispro (ADMELOG) corrective regimen sliding scale  insulin lispro (ADMELOG) corrective regimen sliding scale  losartan  pantoprazole    Tablet  polyethylene glycol 3350  senna  spironolactone    Pertinent Labs:  @ 07:11: Na 142, BUN 56<H>, Cr 0.91, <H>, K+ 4.2, Phos --, Mg 2.0, Alk Phos --, ALT/SGPT --, AST/SGOT --, HbA1c --    A1C with Estimated Average Glucose Result: 7.4 % (21 @ 09:36)    Finger Sticks:  POCT Blood Glucose.: 135 mg/dL ( @ 07:52)  POCT Blood Glucose.: 156 mg/dL ( @ 21:04)  POCT Blood Glucose.: 98 mg/dL ( @ 16:13)  POCT Blood Glucose.: 179 mg/dL ( @ 11:31)    Skin per nursing documentation: suspected DTI to sacrum  Edema: 2+ bilateral arm    Estimated Needs:   [x] no change since previous assessment  [] recalculated:     Previous Nutrition Diagnosis: Increased Nutrient Needs  Nutrition Diagnosis is: [x] ongoing  [] resolved [] not applicable     New Nutrition Diagnosis: [x] Severe chronic malnutrition related to inadequate protein energy intake in setting of increased physiological demand for nutrients 2/2 pressure wounds as evidenced by pt meeting <75% EER > 1 month, 16% wt loss x 1-2 months, BMI 18.4    Nutrition Care Plan:  [x] In Progress  [] Achieved  [] Not applicable    Nutrition Interventions:     Education Provided:       [x] Yes: Education provided to niece on importance of adequate protein intake to aid in wound healing, reviewed protein sources. Encouraged provision/encouragement of oral nutrition supplements in addition to meals to help pt meet estimated nutrient needs.    Recommendations:      1. Continue consistent carbohydrate, low sodium diet - defer diet textures/consistencies to team.  2. Glucerna Shake 240mls 3x daily (660kcals, 30g protein) to promote PO intake.  3. Continue Don BID and vitamin C to promote wound healing, recommend multivitamin supplementation if medically feasible.  4. Provide encouragement with PO intake, menu selections, and assistance with meals as needed.     Monitoring and Evaluation:   Continue to monitor nutritional intake, tolerance to diet prescription, weights, labs, skin integrity    RD remains available upon request and will follow up per protocol    Shirley Bran MS, RD, CDN Pager# [phone]

## 2021-09-08 NOTE — PROGRESS NOTE ADULT - PROBLEM SELECTOR PLAN 2
- Structural cardiology consulted for symptomatic severe MR, though in discussions with the team - the anatomic limitations of her mitral valve plus significant frailty would make her a poor candidate for any intervention  - repeat TTE  - afterload reduction with Entresto as above

## 2021-09-09 DIAGNOSIS — R06.00 DYSPNEA, UNSPECIFIED: ICD-10-CM

## 2021-09-09 DIAGNOSIS — Z51.5 ENCOUNTER FOR PALLIATIVE CARE: ICD-10-CM

## 2021-09-09 DIAGNOSIS — R53.81 OTHER MALAISE: ICD-10-CM

## 2021-09-09 LAB
ANION GAP SERPL CALC-SCNC: 14 MMOL/L — SIGNIFICANT CHANGE UP (ref 5–17)
BUN SERPL-MCNC: 55 MG/DL — HIGH (ref 7–23)
CALCIUM SERPL-MCNC: 9.3 MG/DL — SIGNIFICANT CHANGE UP (ref 8.4–10.5)
CHLORIDE SERPL-SCNC: 93 MMOL/L — LOW (ref 96–108)
CO2 SERPL-SCNC: 36 MMOL/L — HIGH (ref 22–31)
CREAT SERPL-MCNC: 0.89 MG/DL — SIGNIFICANT CHANGE UP (ref 0.5–1.3)
GLUCOSE BLDC GLUCOMTR-MCNC: 145 MG/DL — HIGH (ref 70–99)
GLUCOSE BLDC GLUCOMTR-MCNC: 145 MG/DL — HIGH (ref 70–99)
GLUCOSE BLDC GLUCOMTR-MCNC: 165 MG/DL — HIGH (ref 70–99)
GLUCOSE BLDC GLUCOMTR-MCNC: 198 MG/DL — HIGH (ref 70–99)
GLUCOSE SERPL-MCNC: 122 MG/DL — HIGH (ref 70–99)
HCT VFR BLD CALC: 32.6 % — LOW (ref 34.5–45)
HGB BLD-MCNC: 9.3 G/DL — LOW (ref 11.5–15.5)
MAGNESIUM SERPL-MCNC: 2 MG/DL — SIGNIFICANT CHANGE UP (ref 1.6–2.6)
MCHC RBC-ENTMCNC: 22.2 PG — LOW (ref 27–34)
MCHC RBC-ENTMCNC: 28.5 GM/DL — LOW (ref 32–36)
MCV RBC AUTO: 77.8 FL — LOW (ref 80–100)
NRBC # BLD: 0 /100 WBCS — SIGNIFICANT CHANGE UP (ref 0–0)
PLATELET # BLD AUTO: 191 K/UL — SIGNIFICANT CHANGE UP (ref 150–400)
POTASSIUM SERPL-MCNC: 3.9 MMOL/L — SIGNIFICANT CHANGE UP (ref 3.5–5.3)
POTASSIUM SERPL-SCNC: 3.9 MMOL/L — SIGNIFICANT CHANGE UP (ref 3.5–5.3)
RBC # BLD: 4.19 M/UL — SIGNIFICANT CHANGE UP (ref 3.8–5.2)
RBC # FLD: 17.9 % — HIGH (ref 10.3–14.5)
SODIUM SERPL-SCNC: 143 MMOL/L — SIGNIFICANT CHANGE UP (ref 135–145)
WBC # BLD: 9.3 K/UL — SIGNIFICANT CHANGE UP (ref 3.8–10.5)
WBC # FLD AUTO: 9.3 K/UL — SIGNIFICANT CHANGE UP (ref 3.8–10.5)

## 2021-09-09 PROCEDURE — 99232 SBSQ HOSP IP/OBS MODERATE 35: CPT

## 2021-09-09 PROCEDURE — 93306 TTE W/DOPPLER COMPLETE: CPT | Mod: 26

## 2021-09-09 PROCEDURE — 99233 SBSQ HOSP IP/OBS HIGH 50: CPT

## 2021-09-09 RX ORDER — HALOPERIDOL DECANOATE 100 MG/ML
0.25 INJECTION INTRAMUSCULAR EVERY 8 HOURS
Refills: 0 | Status: DISCONTINUED | OUTPATIENT
Start: 2021-09-09 | End: 2021-09-20

## 2021-09-09 RX ORDER — CEFTRIAXONE 500 MG/1
1000 INJECTION, POWDER, FOR SOLUTION INTRAMUSCULAR; INTRAVENOUS EVERY 24 HOURS
Refills: 0 | Status: DISCONTINUED | OUTPATIENT
Start: 2021-09-09 | End: 2021-09-10

## 2021-09-09 RX ADMIN — Medication 40 MILLIGRAM(S): at 06:39

## 2021-09-09 RX ADMIN — APIXABAN 2.5 MILLIGRAM(S): 2.5 TABLET, FILM COATED ORAL at 06:39

## 2021-09-09 RX ADMIN — SACUBITRIL AND VALSARTAN 1 TABLET(S): 24; 26 TABLET, FILM COATED ORAL at 06:40

## 2021-09-09 RX ADMIN — Medication 1 TABLET(S): at 11:34

## 2021-09-09 RX ADMIN — CARVEDILOL PHOSPHATE 3.12 MILLIGRAM(S): 80 CAPSULE, EXTENDED RELEASE ORAL at 17:22

## 2021-09-09 RX ADMIN — CARVEDILOL PHOSPHATE 3.12 MILLIGRAM(S): 80 CAPSULE, EXTENDED RELEASE ORAL at 06:39

## 2021-09-09 RX ADMIN — NYSTATIN CREAM 1 APPLICATION(S): 100000 CREAM TOPICAL at 06:39

## 2021-09-09 RX ADMIN — SENNA PLUS 2 TABLET(S): 8.6 TABLET ORAL at 22:00

## 2021-09-09 RX ADMIN — PANTOPRAZOLE SODIUM 40 MILLIGRAM(S): 20 TABLET, DELAYED RELEASE ORAL at 06:39

## 2021-09-09 RX ADMIN — CEFTRIAXONE 100 MILLIGRAM(S): 500 INJECTION, POWDER, FOR SOLUTION INTRAMUSCULAR; INTRAVENOUS at 17:20

## 2021-09-09 RX ADMIN — APIXABAN 2.5 MILLIGRAM(S): 2.5 TABLET, FILM COATED ORAL at 17:20

## 2021-09-09 RX ADMIN — Medication 500 MILLIGRAM(S): at 11:34

## 2021-09-09 RX ADMIN — POLYETHYLENE GLYCOL 3350 17 GRAM(S): 17 POWDER, FOR SOLUTION ORAL at 11:34

## 2021-09-09 RX ADMIN — SACUBITRIL AND VALSARTAN 1 TABLET(S): 24; 26 TABLET, FILM COATED ORAL at 17:22

## 2021-09-09 RX ADMIN — Medication 125 MICROGRAM(S): at 06:39

## 2021-09-09 RX ADMIN — Medication 1: at 16:36

## 2021-09-09 RX ADMIN — SPIRONOLACTONE 25 MILLIGRAM(S): 25 TABLET, FILM COATED ORAL at 06:40

## 2021-09-09 RX ADMIN — CHLORHEXIDINE GLUCONATE 1 APPLICATION(S): 213 SOLUTION TOPICAL at 11:11

## 2021-09-09 RX ADMIN — NYSTATIN CREAM 1 APPLICATION(S): 100000 CREAM TOPICAL at 17:21

## 2021-09-09 NOTE — PROGRESS NOTE ADULT - SUBJECTIVE AND OBJECTIVE BOX
HPI:  Note patient is A&Ox1 at time of admit to [facility] and therefore history is obtained is limited to previous charting. baseline mental status also suggested dementia reporting A&Ox2 baseline at [facility]    85F Haitian-speaking w/ hx of severe MV regurgitation, HFrEF s/p AICD, chronic afib, DM2, HTN, hyperthyroidism presented initially to [facility] with acute heart failure now transferred to [facility] for further management. The patient was admitted initially to [facility]  with symptoms of sob and lower extremity swelling in setting of delaying mitral clip at [facility] reportedly planned 1mo prior. She required dobutamine for IV furosemide diuresis, completed TTE demonstrating severe MR, TR, pulmHTN, and grossly low normal left ventricular systolic function. She additionally was identified to have dysphagia and completed speech and swallow which identify need for dysphagia diet. Wound care RN additionally identified and were treating decubitus wounds which were present prior to hospitalization. (29 Aug 2021 23:34)    PERTINENT PM/SXH:   CHF (Congestive Heart Failure)    COPD (Chronic Obstructive Pulmonary Disease)    Asthma    DM (Diabetes Mellitus)    Artificial Cardiac Pacemaker    Arrhythmia    CVA (Cerebral Vascular Accident)      Presence of cardiac defibrillator      FAMILY HISTORY:  No pertinent family history in first degree relatives      ITEMS NOT CHECKED ARE NOT PRESENT    SOCIAL HISTORY:   Significant other/partner[ ]  Children[  ]  Sikh/Spirituality:  Substance hx:  [ ]   Tobacco hx:  [ ]   Alcohol hx: [ ]   Home Opioid hx:  [ ] I-Stop Reference No:  Living Situation: [ x]Home  [ ]Long term care  [ ]Rehab [ ]Other    ADVANCE DIRECTIVES:    DNR  MOLST  [ ]  Living Will  [ ]   DECISION MAKER(s):  [ ] Health Care Proxy(s)  [x ] Surrogate(s)  [ ] Guardian           Name(s): Phone Number(s):    BASELINE (I)ADL(s) (prior to admission):  [place]: [ ]Total  [ ] Moderate [ ]Dependent    Allergies    No Known Allergies    Intolerances    MEDICATIONS  (STANDING):  apixaban 2.5 milliGRAM(s) Oral two times a day  ascorbic acid 500 milliGRAM(s) Oral daily  carvedilol 3.125 milliGRAM(s) Oral every 12 hours  chlorhexidine 4% Liquid 1 Application(s) Topical daily  dextrose 40% Gel 15 Gram(s) Oral once  dextrose 5%. 1000 milliLiter(s) (50 mL/Hr) IV Continuous <Continuous>  dextrose 5%. 1000 milliLiter(s) (100 mL/Hr) IV Continuous <Continuous>  dextrose 50% Injectable 25 Gram(s) IV Push once  dextrose 50% Injectable 12.5 Gram(s) IV Push once  dextrose 50% Injectable 25 Gram(s) IV Push once  digoxin     Tablet 125 MICROGram(s) Oral daily  furosemide    Tablet 40 milliGRAM(s) Oral daily  glucagon  Injectable 1 milliGRAM(s) IntraMuscular once  insulin lispro (ADMELOG) corrective regimen sliding scale   SubCutaneous three times a day before meals  insulin lispro (ADMELOG) corrective regimen sliding scale   SubCutaneous at bedtime  multivitamin 1 Tablet(s) Oral daily  nystatin Powder 1 Application(s) Topical two times a day  pantoprazole    Tablet 40 milliGRAM(s) Oral before breakfast  polyethylene glycol 3350 17 Gram(s) Oral daily  sacubitril 24 mG/valsartan 26 mG 1 Tablet(s) Oral two times a day  senna 2 Tablet(s) Oral at bedtime  spironolactone 25 milliGRAM(s) Oral daily    MEDICATIONS  (PRN):  acetaminophen   Tablet .. 650 milliGRAM(s) Oral every 6 hours PRN Temp greater or equal to 38C (100.4F), Mild Pain (1 - 3)  benzocaine 15 mG/menthol 3.6 mG (Sugar-Free) Lozenge 1 Lozenge Oral every 2 hours PRN Sore Throat  hemorrhoidal Ointment 1 Application(s) Rectal four times a day PRN rectal irritation    PRESENT SYMPTOMS: [ ]Unable to obtain due to poor mentation   Source if other than patient:  [ ]Family   [ ]Team     Pain: [ ]yes [x ]no  QOL impact -   Location -                    Aggravating factors -  Quality -  Radiation -  Timing-  Severity (0-10 scale):  Minimal acceptable level (0-10 scale):     PAIN AD Score:     http://geriatrictoolkit.[employer].edu/cog/painad.pdf (press ctrl +  left click to view)    Dyspnea:                           [ ]Mild [ ]Moderate [ ]Severe  Anxiety:                             [ ]Mild [ ]Moderate [ ]Severe  Fatigue:                             [ ]Mild [ ]Moderate [ ]Severe  Nausea:                             [ ]Mild [ ]Moderate [ ]Severe  Loss of appetite:              [ ]Mild [ ]Moderate [ ]Severe  Constipation:                    [ ]Mild [ ]Moderate [ ]Severe    Other Symptoms:  [ x]All other review of systems negative     Palliative Performance Status Version 2:      40   %    http://[facility].org/files/news/palliative_performance_scale_ppsv2.pdf  PHYSICAL EXAM:  Vital Signs Last 24 Hrs  T(C): 36.7 (09 Sep 2021 11:10), Max: 36.7 (09 Sep 2021 11:10)  T(F): 98.1 (09 Sep 2021 11:10), Max: 98.1 (09 Sep 2021 11:10)  HR: 72 (09 Sep 2021 11:10) (44 - 104)  BP: 106/75 (09 Sep 2021 11:10) (94/65 - 114/78)  BP(mean): --  RR: 18 (09 Sep 2021 11:10) (17 - 18)  SpO2: 95% (09 Sep 2021 11:10) (92% - 95%) I&O's Summary    08 Sep 2021 07:01  -  09 Sep 2021 07:00  --------------------------------------------------------  IN: 480 mL / OUT: 950 mL / NET: -470 mL    09 Sep 2021 07:01  -  09 Sep 2021 15:01  --------------------------------------------------------  IN: 600 mL / OUT: 400 mL / NET: 200 mL      GENERAL:  [  ]Alert  [  ]Oriented x  3  [ ]Lethargic  [ ]Cachexia  [ ]Unarousable  [x ]Verbal  [ ]Non-Verbal  Behavioral:   [ ] Anxiety  [ ] Delirium [ ] Agitation [x ] Other Calm  HEENT:  [x ]Normal   [ ]Dry mouth   [ ]ET Tube/Trach  [ ]Oral lesions  PULMONARY:   [  ]Clear [ ]Tachypnea  [ ]Audible excessive secretions   [ ]Rhonchi        [ ]Right [ ]Left [ ]Bilateral  [ ]Crackles        [ ]Right [ ]Left [ ]Bilateral  [ ]Wheezing     [ ]Right [ ]Left [ ]Bilatera  x[x ]Diminished breath sounds [ ]right [ ]left [ ]bilateral  CARDIOVASCULAR: AICD  [ x]Regular [ ]Irregular [ ]Tachy  [ ]Juan A [ ]Murmur [ ]Other  GASTROINTESTINAL:  [ x]Soft  [ ]Distended   [ ]+BS  [x ]Non tender [ ]Tender  [ ]PEG [ ]OGT/ NGT  Last BM:   GENITOURINARY:  [ x]Normal [ ] Incontinent   [ ]Oliguria/Anuria   [ ]Esquivel  MUSCULOSKELETAL:   [  ]Normal   [  ]Weakness  [ ]Bed/Wheelchair bound [ ]Edema  NEUROLOGIC:   [ x]No focal deficits  [ ]Cognitive impairment  [ ]Dysphagia [ ]Dysarthria [ ]Paresis [ ]Other   SKIN:   [x ]Normal    [ ]Rash  [ ]Pressure ulcer(s)       Present on admission [ ]y [ ]n    CRITICAL CARE:  [ ] Shock Present  [ ]Septic [ ]Cardiogenic [ ]Neurologic [ ]Hypovolemic  [ ]  Vasopressors [ ]  Inotropes   [ ]Respiratory failure present [ ]Mechanical ventilation [ ]Non-invasive ventilatory support [ ]High flow  [ ]Acute  [ ]Chronic [ ]Hypoxic  [ ]Hypercarbic [ ]Other  [ ]Other organ failure     LABS:                        9.3    9.30  )-----------( 191      ( 09 Sep 2021 06:10 )             32.6   -    143  |  93<L>  |  55<H>  ----------------------------<  122<H>  3.9   |  36<H>  |  0.89    Ca    9.3      09 Sep 2021 06:10  Mg     2.0             Urinalysis Basic - ( 07 Sep 2021 18:17 )    Color: Light Orange / Appearance: Turbid / S.012 / pH: x  Gluc: x / Ketone: Negative  / Bili: Negative / Urobili: 2 mg/dL   Blood: x / Protein: 30 mg/dL / Nitrite: Negative   Leuk Esterase: Large / RBC: 3 /hpf /  /HPF   Sq Epi: x / Non Sq Epi: 6 /hpf / Bacteria: Many      RADIOLOGY & ADDITIONAL STUDIES:    PROTEIN CALORIE MALNUTRITION PRESENT: [ ]mild [ ]moderate [ ]severe [ ]underweight [ ]morbid obesity  https://www.andeal.org/vault/6949/web/files/ONC/Table_Clinical%20Characteristics%20to%20Document%20Malnutrition-White%20JV%20et%20al%2020.pdf    Height (cm): 160 (21 @ 14:57)  Weight (kg): 52.8 (21 @ 20:15), 45.4 (21 @ 14:57)  BMI (kg/m2): 20.6 (21 @ 20:15), 17.7 (21 @ 14:57)    [ ]PPSV2 < or = to 30% [ ]significant weight loss  [ ]poor nutritional intake  [ ]anasarca      [ ]Artificial Nutrition      REFERRALS:   [ ]Chaplaincy  [ ]Hospice  [ ]Child Life  [ ]Social Work  [ ]Case management [ ]Holistic Therapy     Goals of Care Document:

## 2021-09-09 NOTE — PROGRESS NOTE ADULT - ATTENDING COMMENTS
TTE today when euvolemic and off inotropes reveals LVEF 60% with continued severe MR. She is tolerating higher doses of GDMT and has been diuresed 40 lbs this admission. Due to severe MR but deemed poor candidate for intervention as well as advanced age and other comorbidities her prognosis is poor and risk of readmission is high for which palliative care has been consulted with plans to institute home care and consider transition to hospice in the future. Stable for discharge from a HF perspective and she will follow continue to with Dr. Granados as an outpatient. TTE today when euvolemic and off inotropes reveals LVEF 40-45% (read as 60%) with continued severe eccentric MR. She is tolerating higher doses of GDMT and has been diuresed 40 lbs this admission. Due to severe MR but deemed poor candidate for intervention as well as advanced age and other comorbidities her prognosis is poor and risk of readmission is high for which palliative care has been consulted with plans to institute home care and consider transition to hospice in the future. Stable for discharge from a HF perspective and she will follow continue to with Dr. Granados as an outpatient.

## 2021-09-09 NOTE — DISCHARGE NOTE PROVIDER - NSDCCPCAREPLAN_GEN_ALL_CORE_FT
PRINCIPAL DISCHARGE DIAGNOSIS  Diagnosis: Acute on chronic systolic congestive heart failure  Assessment and Plan of Treatment: Weigh yourself daily.  If you gain 3lbs in 3 days, or 5lbs in a week call your Health Care Provider.  Do not eat or drink foods containing more than 2000mg of salt (sodium) in your diet every day.  Call your Health Care Provider if you have any swelling or increased swelling in your feet, ankles, and/or stomach.  Take all of your medication as directed.  If you become dizzy call your Health Care Provider.        SECONDARY DISCHARGE DIAGNOSES  Diagnosis: Chronic atrial fibrillation  Assessment and Plan of Treatment: Atrial fibrillation is the most common heart rhythm problem & has the risk of stroke & heart attack  It helps if you control your blood pressure, not drink more than 1-2 alcohol drinks per day, cut down on caffeine, getting treatment for over active thyroid gland, & getting exercise  Call your doctor if you feel your heart racing or beating unusually, chest tightness or pain, lightheaded, faint, shortness of breath especially with exercise  It is important to take your heart medication as prescribed  You may be on anticoagulation which is very important to take as directed - you may need blood work to monitor drug levels      Diagnosis: Diabetes  Assessment and Plan of Treatment: HgA1C this admission.  Make sure you get your HgA1c checked every three months.  If you take oral diabetes medications, check your blood glucose two times a day.  If you take insulin, check your blood glucose before meals and at bedtime.  It's important not to skip any meals.  Keep a log of your blood glucose results and always take it with you to your doctor appointments.  Keep a list of your current medications including injectables and over the counter medications and bring this medication list with you to all your doctor appointments.  If you have not seen your opthalmologist this year call for appointment.  Check your feet daily for redness, sores, or openings. Do not self treat. If no improvement in two days call your primary care physician for an appointment.  Low blood sugar (hypoglycemia) is a blood sugar below 70mg/dl. Check your blood sugar if you feel signs/symptoms of hypoglycemia. If your blood sugar is below 70 take 15 grams of carbohydrates (ex 4 oz of apple juice, 3-4 glucosr tablets, or 4-6 oz of regular soda) wait 15 minutes and repeat blood sugar to make sure it comes up above 70.  If your blood sugar is above 70 and you are due for a meal, have a meal.  If you are not due for a meal have a snack.  This snack helps keeps your blood sugar at a safe range.       PRINCIPAL DISCHARGE DIAGNOSIS  Diagnosis: Acute on chronic systolic congestive heart failure  Assessment and Plan of Treatment: Weigh yourself daily.  If you gain 3lbs in 3 days, or 5lbs in a week call your Health Care Provider.  Do not eat or drink foods containing more than 2000mg of salt (sodium) in your diet every day.  Call your Health Care Provider if you have any swelling or increased swelling in your feet, ankles, and/or stomach.  Take all of your medication as directed.  If you become dizzy call your Health Care Provider.  AICD interrogated with normal function  Continue lasix, coreg, entresto, aldactone as ordered  Follow up with Dr. Granados in 1 week        SECONDARY DISCHARGE DIAGNOSES  Diagnosis: Chronic atrial fibrillation  Assessment and Plan of Treatment: Atrial fibrillation is the most common heart rhythm problem & has the risk of stroke & heart attack  It helps if you control your blood pressure, not drink more than 1-2 alcohol drinks per day, cut down on caffeine, getting treatment for over active thyroid gland, & getting exercise  Call your doctor if you feel your heart racing or beating unusually, chest tightness or pain, lightheaded, faint, shortness of breath especially with exercise  It is important to take your heart medication as prescribed  You may be on anticoagulation which is very important to take as directed - you may need blood work to monitor drug levels  Continue eliquis ans digoxin as ordered      Diagnosis: Severe mitral valve regurgitation  Assessment and Plan of Treatment: poor candidate for intervention    Diagnosis: Diabetes  Assessment and Plan of Treatment: HgA1C this admission 7.4  Make sure you get your HgA1c checked every three months.  If you take oral diabetes medications, check your blood glucose two times a day.  If you take insulin, check your blood glucose before meals and at bedtime.  It's important not to skip any meals.  Keep a log of your blood glucose results and always take it with you to your doctor appointments.  Keep a list of your current medications including injectables and over the counter medications and bring this medication list with you to all your doctor appointments.  If you have not seen your opthalmologist this year call for appointment.  Check your feet daily for redness, sores, or openings. Do not self treat. If no improvement in two days call your primary care physician for an appointment.  Low blood sugar (hypoglycemia) is a blood sugar below 70mg/dl. Check your blood sugar if you feel signs/symptoms of hypoglycemia. If your blood sugar is below 70 take 15 grams of carbohydrates (ex 4 oz of apple juice, 3-4 glucosr tablets, or 4-6 oz of regular soda) wait 15 minutes and repeat blood sugar to make sure it comes up above 70.  If your blood sugar is above 70 and you are due for a meal, have a meal.  If you are not due for a meal have a snack.  This snack helps keeps your blood sugar at a safe range.      Diagnosis: Acute UTI  Assessment and Plan of Treatment: + for ESBL Ecoli UTI- per infectiosu disease please complete macrobid 9/17- 9/23

## 2021-09-09 NOTE — DISCHARGE NOTE PROVIDER - NSDCFUADDAPPT_GEN_ALL_CORE_FT
APPTS ARE READY TO BE MADE: [ x] YES    Best Family or Patient Contact (if needed):  Megan niece: [phone] - HCP  Additional Information about above appointments (if needed):    1:   2:   3:     Other comments or requests:    APPTS ARE READY TO BE MADE: [ x] YES    Best Family or Patient Contact (if needed):  Megan niece: [phone] - HCP  Additional Information about above appointments (if needed):    1:   2:   3:       Other comments or requests:     Patient was provided with Dr. Yari Granados's information and advised to call to schedule follow up within specified time frame.

## 2021-09-09 NOTE — PROGRESS NOTE ADULT - PROBLEM SELECTOR PLAN 2
Condition: MVR  Degree: Severe  Active treatment: medical management, no surgery being offered  Clinical impact on complexity: High

## 2021-09-09 NOTE — DISCHARGE NOTE PROVIDER - DETAILS OF MALNUTRITION DIAGNOSIS/DIAGNOSES
This patient has been assessed with a concern for Malnutrition and was treated during this hospitalization for the following Nutrition diagnosis/diagnoses:     -  09/08/2021: Severe protein-calorie malnutrition   -  09/08/2021: Underweight (BMI < 19)

## 2021-09-09 NOTE — DISCHARGE NOTE PROVIDER - CARE PROVIDER_API CALL
Haile Granados)  Cardiology  [address] [place] [address]  Phone: [phone]  Fax: [phone]  Follow Up Time: 1 week

## 2021-09-09 NOTE — PROGRESS NOTE ADULT - SUBJECTIVE AND OBJECTIVE BOX
Subjective:  - resting comfortably in bed  - denies pain and SOB    Medications:  acetaminophen   Tablet .. 650 milliGRAM(s) Oral every 6 hours PRN  apixaban 2.5 milliGRAM(s) Oral two times a day  ascorbic acid 500 milliGRAM(s) Oral daily  benzocaine 15 mG/menthol 3.6 mG (Sugar-Free) Lozenge 1 Lozenge Oral every 2 hours PRN  carvedilol 3.125 milliGRAM(s) Oral every 12 hours  chlorhexidine 4% Liquid 1 Application(s) Topical daily  dextrose 40% Gel 15 Gram(s) Oral once  dextrose 5%. 1000 milliLiter(s) IV Continuous <Continuous>  dextrose 5%. 1000 milliLiter(s) IV Continuous <Continuous>  dextrose 50% Injectable 25 Gram(s) IV Push once  dextrose 50% Injectable 12.5 Gram(s) IV Push once  dextrose 50% Injectable 25 Gram(s) IV Push once  digoxin     Tablet 125 MICROGram(s) Oral daily  furosemide    Tablet 40 milliGRAM(s) Oral daily  glucagon  Injectable 1 milliGRAM(s) IntraMuscular once  hemorrhoidal Ointment 1 Application(s) Rectal four times a day PRN  insulin lispro (ADMELOG) corrective regimen sliding scale   SubCutaneous three times a day before meals  insulin lispro (ADMELOG) corrective regimen sliding scale   SubCutaneous at bedtime  multivitamin 1 Tablet(s) Oral daily  nystatin Powder 1 Application(s) Topical two times a day  pantoprazole    Tablet 40 milliGRAM(s) Oral before breakfast  polyethylene glycol 3350 17 Gram(s) Oral daily  sacubitril 24 mG/valsartan 26 mG 1 Tablet(s) Oral two times a day  senna 2 Tablet(s) Oral at bedtime  spironolactone 25 milliGRAM(s) Oral daily      Physical Exam:    Vitals:  Vital Signs Last 24 Hours  T(C): 36.7 (21 @ 11:10), Max: 36.7 (21 @ 11:10)  HR: 72 (21 @ 11:10) (44 - 104)  BP: 106/75 (21 @ 11:10) (94/65 - 121/80)  RR: 18 (21 @ 11:10) (17 - 18)  SpO2: 95% (21 @ 11:10) (92% - 95%)    Weight in k.5 ( @ 08:00)    I&O's Summary    08 Sep 2021 07:01  -  09 Sep 2021 07:00  --------------------------------------------------------  IN: 480 mL / OUT: 950 mL / NET: -470 mL    Tele: vpaced HR 60-80    General: Frail. No distress. Comfortable.  HEENT: EOM intact.  Neck: Neck supple. JVP 8-10cm H2O. No masses  Chest: Clear to auscultation bilaterally  CV: Regular, Normal S1 and S2. II/VI SM. Radial pulses normal. No LE edema  Abdomen: Soft, non-distended, non-tender  Skin: No rashes or skin breakdown  Neurology: Alert and oriented times three. Sensation intact  Psych: Affect normal    Labs:                        9.3    9.30  )-----------( 191      ( 09 Sep 2021 06:10 )             32.6         143  |  93<L>  |  55<H>  ----------------------------<  122<H>  3.9   |  36<H>  |  0.89    Ca    9.3      09 Sep 2021 06:10  Mg     2.0

## 2021-09-09 NOTE — DISCHARGE NOTE PROVIDER - NSFOLLOWUPCLINICS_GEN_ALL_ED_FT
[facility] General Internal Medicine  General Internal Medicine  [address] [id number]  Phone: [phone]  Fax:   Follow Up Time: 1 week

## 2021-09-09 NOTE — DISCHARGE NOTE PROVIDER - HOSPITAL COURSE
87 YO F with with a history of ACC/AHA Stage C NICM with improved LVEF (LV 4.9 cm, LVEF 40-45%) s/p CRT-D, severe mitral regurgitation being considered for MitraClip at [facility], chronic AF/AFl, and DM2 who was admitted to [facility] with ADHF and transferred to [facility] for consideration of MitraClip. She initially had JUANY and transaminitis and was initiated on dobutamine out of concern for decreased cardiac output. Patient is nor s/p IV diuresis. TTE on 9/9 when euvolemic and off inotropes reveals LVEF 40-45% (read as 60%) with continued severe eccentric MR. She is tolerating higher doses of GDMT and has been diuresed 40 lbs this admission. Due to severe MR but deemed poor candidate for intervention as well as advanced age and other comorbidities her prognosis is poor and risk of readmission is high for which palliative care has been consulted with plans to institute home care and consider transition to hospice in the future. Stable for discharge from a HF perspective and she will follow continue to with Dr. Granados as an outpatient. 85 YO F with with a history of ACC/AHA Stage C NICM with improved LVEF (LV 4.9 cm, LVEF 40-45%) s/p CRT-D, severe mitral regurgitation being considered for MitraClip at [facility], chronic AF/AFl, and DM2 who was admitted to [facility] with ADHF and transferred to [facility] for consideration of MitraClip. She initially had JUANY and transaminitis and was initiated on dobutamine out of concern for decreased cardiac output. Patient is nor s/p IV diuresis. TTE on 9/9 when euvolemic and off inotropes reveals LVEF 40-45% (read as 60%) with continued severe eccentric MR. She is tolerating higher doses of GDMT and has been diuresed 40 lbs this admission. Due to severe MR but deemed poor candidate for intervention as well as advanced age and other comorbidities her prognosis is poor and risk of readmission is high for which palliative care has been consulted with plans to institute home care and consider transition to hospice in the future. Stable for discharge from a HF perspective, will continue entresto, lasix, aldactone, coreg and she will follow continue to with Dr. Granados as an outpatient. Pt found to have ESBl UTI for which ID recommending to complete 7 days of macrobid and ok for dispo from ID standpoint. Transaminitis during this admission has now resolved with diuresis. Pt will continue on eliquis for afib. JUANY has now resolved.  Pt now HDS and safe for discharge. Discussed with medicine attending. Med rec to be reviewed prior to discharge.     Pt will require 24 hours home care services for safe discharge.            87 YO F with with a history of ACC/AHA Stage C NICM with improved LVEF (LV 4.9 cm, LVEF 40-45%) s/p CRT-D, severe mitral regurgitation being considered for MitraClip at [facility], chronic AF/AFl, and DM2 who [facility] and transferred to [facility] for consideration of MitraClip. She initially had JUANY and transaminitis and was initiated on dobutamine out of concern for decreased cardiac output. Patient is nor s/p IV diuresis. TTE on 9/9 when euvolemic and off inotropes reveals LVEF 40-45% (read as 60%) with continued severe eccentric MR. She is tolerating higher doses of GDMT and has been diuresed 40 lbs this admission. Due to severe MR but deemed poor candidate for intervention as well as advanced age and other comorbidities her prognosis is poor and risk of readmission is high for which palliative care has been consulted with plans to institute home care and consider transition to hospice in the future. Stable for discharge from a HF perspective, will continue entresto, lasix, aldactone, coreg and she will follow continue to with Dr. Granados as an outpatient. Pt found to have ESBl UTI for which ID recommending to complete 7 days of macrobid and ok for dispo from ID standpoint. Transaminitis during this admission has now resolved with diuresis. Pt will continue on eliquis for afib. JUANY has now resolved.  Pt now HDS and safe for discharge. Discussed with medicine attending. Med rec to be reviewed prior to discharge.     Pt will require home care services for safe discharge.

## 2021-09-09 NOTE — PROGRESS NOTE ADULT - ASSESSMENT
87 YO F with with a history of ACC/AHA Stage C NICM with improved LVEF (LV 4.9 cm, LVEF 40-45%) s/p CRT-D, severe mitral regurgitation being considered for MitraClip at [facility], chronic AF/AFl, and DM2 who was admitted to [facility] with ADHF and transferred to [facility] for consideration of MitraClip. She initially had JUANY and transaminitis and was initiated on dobutamine out of concern for decreased cardiac output.     She currently appears close to euvolemic on exam and overall has symptomatically improved. She is tolerating low dose neurohromonal antagonists. Her mitral regurgitation appears disproportionate to her LV dysfunction and likely driving her heart failure syndrome. Plan was to diurese to euvolemia and evaluate for FIDELINA but likely not a candidate for MV intervention given significant frailty and anatomic limitations so palliative consulted to discuss GOC as transition to hospice would be reasonable if aligned with her and her family's wishes. She's stable for discharge from HF perspective with plan to follow up with Dr. Granados. We will sign off, please call with any additional questions.     REVIEW OF STUDIES  TTE 8/27: LV 4.9 cm, LVEF 40-45%, severe eccentric posterior MR (appears to be due to immobile and tethered posterior mitral valve leaflet), severe biatrial enlargement, normal RV size/function, severe TR, estimated PASP 62 mmHg    EKG: underlying rhythm AF/AFl, BiV pacing  [facility] 2011: minimal CAD

## 2021-09-09 NOTE — DISCHARGE NOTE PROVIDER - REASON FOR ADMISSION
85F transferred from [facility] to [facility] for acute heart failure w/ severe Mitral Regurgitation

## 2021-09-09 NOTE — DISCHARGE NOTE PROVIDER - NSDCMRMEDTOKEN_GEN_ALL_CORE_FT
acetaminophen 325 mg oral tablet: 2 tab(s) orally every 4 hours  benzocaine-menthol 15 mg-2.1 mg mucous membrane lozenge: 1 lozenge mucous membrane once a day  digoxin 100 mcg/mL (0.1 mg/mL) injectable solution: 100 milligram(s) injectable once a day  DOBUTamine:   folic acid 1 mg oral tablet: 1 tab(s) orally once a day  furosemide: 40 milligram(s) injectable every 12 hours  insulin lispro:   Lovenox: 50 milligram(s) injectable once a day  pantoprazole 40 mg oral delayed release tablet: 1 tab(s) orally once a day   acetaminophen 325 mg oral tablet: 2 tab(s) orally every 4 hours as need for pain of fever 100.4 or higher  folic acid 1 mg oral tablet: 1 tab(s) orally once a day  insulin lispro:   Multiple Vitamins oral tablet: 1 tab(s) orally once a day  nystatin 100,000 units/g topical powder: 1 application topically 2 times a day  pantoprazole 40 mg oral delayed release tablet: 1 tab(s) orally once a day  polyethylene glycol 3350 oral powder for reconstitution: 17 gram(s) orally once a day  senna oral tablet: 2 tab(s) orally once a day (at bedtime)   acetaminophen 325 mg oral tablet: 2 tab(s) orally every 4 hours as need for pain of fever 100.4 or higher  apixaban 2.5 mg oral tablet: 1 tab(s) orally 2 times a day  ascorbic acid 500 mg oral tablet: 1 tab(s) orally once a day  carvedilol 3.125 mg oral tablet: 1 tab(s) orally every 12 hours  digoxin 125 mcg (0.125 mg) oral tablet: 1 tab(s) orally once a day  folic acid 1 mg oral tablet: 1 tab(s) orally once a day  furosemide 40 mg oral tablet: 1 tab(s) orally once a day  insulin lispro:   Multiple Vitamins oral tablet: 1 tab(s) orally once a day  nitrofurantoin macrocrystals-monohydrate 100 mg oral capsule: 1 cap(s) orally 2 times a day from 9/17/21- 9/23/21  nystatin 100,000 units/g topical powder: 1 application topically 2 times a day  pantoprazole 40 mg oral delayed release tablet: 1 tab(s) orally once a day  polyethylene glycol 3350 oral powder for reconstitution: 17 gram(s) orally once a day  Preparation H 14%-74.9%-0.25% rectal ointment: 1 application rectally 4 times a day as neede for rectal irritation  sacubitril-valsartan 24 mg-26 mg oral tablet: 1 tab(s) orally 2 times a day  senna oral tablet: 2 tab(s) orally once a day (at bedtime)  spironolactone 25 mg oral tablet: 1 tab(s) orally once a day

## 2021-09-10 LAB
ANION GAP SERPL CALC-SCNC: 11 MMOL/L — SIGNIFICANT CHANGE UP (ref 5–17)
BUN SERPL-MCNC: 61 MG/DL — HIGH (ref 7–23)
CALCIUM SERPL-MCNC: 9 MG/DL — SIGNIFICANT CHANGE UP (ref 8.4–10.5)
CHLORIDE SERPL-SCNC: 94 MMOL/L — LOW (ref 96–108)
CO2 SERPL-SCNC: 36 MMOL/L — HIGH (ref 22–31)
CREAT SERPL-MCNC: 1.11 MG/DL — SIGNIFICANT CHANGE UP (ref 0.5–1.3)
CULTURE RESULTS: SIGNIFICANT CHANGE UP
GLUCOSE BLDC GLUCOMTR-MCNC: 132 MG/DL — HIGH (ref 70–99)
GLUCOSE BLDC GLUCOMTR-MCNC: 178 MG/DL — HIGH (ref 70–99)
GLUCOSE BLDC GLUCOMTR-MCNC: 203 MG/DL — HIGH (ref 70–99)
GLUCOSE BLDC GLUCOMTR-MCNC: 84 MG/DL — SIGNIFICANT CHANGE UP (ref 70–99)
GLUCOSE SERPL-MCNC: 196 MG/DL — HIGH (ref 70–99)
HCT VFR BLD CALC: 26.6 % — LOW (ref 34.5–45)
HGB BLD-MCNC: 7.4 G/DL — LOW (ref 11.5–15.5)
MCHC RBC-ENTMCNC: 22.2 PG — LOW (ref 27–34)
MCHC RBC-ENTMCNC: 27.8 GM/DL — LOW (ref 32–36)
MCV RBC AUTO: 79.9 FL — LOW (ref 80–100)
NRBC # BLD: 0 /100 WBCS — SIGNIFICANT CHANGE UP (ref 0–0)
PLATELET # BLD AUTO: 158 K/UL — SIGNIFICANT CHANGE UP (ref 150–400)
POTASSIUM SERPL-MCNC: 3.9 MMOL/L — SIGNIFICANT CHANGE UP (ref 3.5–5.3)
POTASSIUM SERPL-SCNC: 3.9 MMOL/L — SIGNIFICANT CHANGE UP (ref 3.5–5.3)
RBC # BLD: 3.33 M/UL — LOW (ref 3.8–5.2)
RBC # FLD: 17.8 % — HIGH (ref 10.3–14.5)
SODIUM SERPL-SCNC: 141 MMOL/L — SIGNIFICANT CHANGE UP (ref 135–145)
SPECIMEN SOURCE: SIGNIFICANT CHANGE UP
WBC # BLD: 8.13 K/UL — SIGNIFICANT CHANGE UP (ref 3.8–10.5)
WBC # FLD AUTO: 8.13 K/UL — SIGNIFICANT CHANGE UP (ref 3.8–10.5)

## 2021-09-10 PROCEDURE — 99497 ADVNCD CARE PLAN 30 MIN: CPT

## 2021-09-10 PROCEDURE — 99233 SBSQ HOSP IP/OBS HIGH 50: CPT

## 2021-09-10 RX ADMIN — APIXABAN 2.5 MILLIGRAM(S): 2.5 TABLET, FILM COATED ORAL at 05:59

## 2021-09-10 RX ADMIN — Medication 2: at 12:31

## 2021-09-10 RX ADMIN — CHLORHEXIDINE GLUCONATE 1 APPLICATION(S): 213 SOLUTION TOPICAL at 11:48

## 2021-09-10 RX ADMIN — SENNA PLUS 2 TABLET(S): 8.6 TABLET ORAL at 22:13

## 2021-09-10 RX ADMIN — NYSTATIN CREAM 1 APPLICATION(S): 100000 CREAM TOPICAL at 17:08

## 2021-09-10 RX ADMIN — Medication 125 MICROGRAM(S): at 05:59

## 2021-09-10 RX ADMIN — APIXABAN 2.5 MILLIGRAM(S): 2.5 TABLET, FILM COATED ORAL at 17:08

## 2021-09-10 RX ADMIN — SACUBITRIL AND VALSARTAN 1 TABLET(S): 24; 26 TABLET, FILM COATED ORAL at 17:08

## 2021-09-10 RX ADMIN — NYSTATIN CREAM 1 APPLICATION(S): 100000 CREAM TOPICAL at 05:58

## 2021-09-10 RX ADMIN — Medication 1 TABLET(S): at 12:33

## 2021-09-10 RX ADMIN — PANTOPRAZOLE SODIUM 40 MILLIGRAM(S): 20 TABLET, DELAYED RELEASE ORAL at 05:58

## 2021-09-10 RX ADMIN — Medication 500 MILLIGRAM(S): at 12:30

## 2021-09-10 NOTE — PROGRESS NOTE ADULT - SUBJECTIVE AND OBJECTIVE BOX
HPI:  Note patient is A&Ox1 at time of admit to [facility] and therefore history is obtained is limited to previous charting. baseline mental status also suggested dementia reporting A&Ox2 baseline at [facility]    85F Divehi-speaking w/ hx of severe MV regurgitation, HFrEF s/p AICD, chronic afib, DM2, HTN, hyperthyroidism presented initially to [facility] with acute heart failure now transferred to [facility] for further management. The patient was admitted initially to [facility]  with symptoms of sob and lower extremity swelling in setting of delaying mitral clip at [facility] reportedly planned 1mo prior. She required dobutamine for IV furosemide diuresis, completed TTE demonstrating severe MR, TR, pulmHTN, and grossly low normal left ventricular systolic function. She additionally was identified to have dysphagia and completed speech and swallow which identify need for dysphagia diet. Wound care RN additionally identified and were treating decubitus wounds which were present prior to hospitalization. (29 Aug 2021 23:34)            Overnight events: No major events  Staff reports: n/a  Patient: Only complains of being cold, link to [facility] note  PRN use: n/a        RECENT VITALS/LABS/MEDICATIONS/ASSAYS     09-10-21 @ 14:01    T(C): 36.2 (09-10-21 @ 12:17), Max: 36.6 (21 @ 16:59)  HR: 57 (09-10-21 @ 12:17) (57 - 104)  BP: 96/58 (09-10-21 @ 12:17) (96/58 - 119/69)  RR: 18 (09-10-21 @ 12:17) (17 - 18)  SpO2: 94% (09-10-21 @ 12:17) (90% - 94%)  Wt(kg): --      09 Sep 2021 07:  -  10 Sep 2021 07:00  --------------------------------------------------------  IN:    Oral Fluid: 600 mL  Total IN: 600 mL    OUT:    Voided (mL): 700 mL  Total OUT: 700 mL    Total NET: -100 mL           @ 07:01  -  09-10 @ 07:00  --------------------------------------------------------  IN: 600 mL / OUT: 700 mL / NET: -100 mL      CAPILLARY BLOOD GLUCOSE      POCT Blood Glucose.: 203 mg/dL (10 Sep 2021 12:05)  POCT Blood Glucose.: 178 mg/dL (10 Sep 2021 05:58)  POCT Blood Glucose.: 198 mg/dL (09 Sep 2021 21:12)  POCT Blood Glucose.: 165 mg/dL (09 Sep 2021 16:27)        acetaminophen   Tablet .. 650 milliGRAM(s) Oral every 6 hours PRN  apixaban 2.5 milliGRAM(s) Oral two times a day  ascorbic acid 500 milliGRAM(s) Oral daily  benzocaine 15 mG/menthol 3.6 mG (Sugar-Free) Lozenge 1 Lozenge Oral every 2 hours PRN  carvedilol 3.125 milliGRAM(s) Oral every 12 hours  chlorhexidine 4% Liquid 1 Application(s) Topical daily  dextrose 40% Gel 15 Gram(s) Oral once  dextrose 5%. 1000 milliLiter(s) IV Continuous <Continuous>  dextrose 5%. 1000 milliLiter(s) IV Continuous <Continuous>  dextrose 50% Injectable 25 Gram(s) IV Push once  dextrose 50% Injectable 12.5 Gram(s) IV Push once  dextrose 50% Injectable 25 Gram(s) IV Push once  digoxin     Tablet 125 MICROGram(s) Oral daily  furosemide    Tablet 40 milliGRAM(s) Oral daily  glucagon  Injectable 1 milliGRAM(s) IntraMuscular once  haloperidol    Injectable 0.25 milliGRAM(s) IV Push every 8 hours PRN  hemorrhoidal Ointment 1 Application(s) Rectal four times a day PRN  insulin lispro (ADMELOG) corrective regimen sliding scale   SubCutaneous three times a day before meals  insulin lispro (ADMELOG) corrective regimen sliding scale   SubCutaneous at bedtime  multivitamin 1 Tablet(s) Oral daily  nystatin Powder 1 Application(s) Topical two times a day  pantoprazole    Tablet 40 milliGRAM(s) Oral before breakfast  polyethylene glycol 3350 17 Gram(s) Oral daily  sacubitril 24 mG/valsartan 26 mG 1 Tablet(s) Oral two times a day  senna 2 Tablet(s) Oral at bedtime  spironolactone 25 milliGRAM(s) Oral daily          09-10    141  |  94<L>  |  61<H>  ----------------------------<  196<H>  3.9   |  36<H>  |  1.11    Ca    9.0      10 Sep 2021 06:30  Mg     2.0             Procalc  BNP  ABG                          7.4    8.13  )-----------( 158      ( 10 Sep 2021 06:31 )             26.6           blood and urine cultures          PERTINENT PM/SXH:   CHF (Congestive Heart Failure)    COPD (Chronic Obstructive Pulmonary Disease)    Asthma    DM (Diabetes Mellitus)    Artificial Cardiac Pacemaker    Arrhythmia    CVA (Cerebral Vascular Accident)      Presence of cardiac defibrillator      FAMILY HISTORY:  No pertinent family history in first degree relatives      ITEMS NOT CHECKED ARE NOT PRESENT    SOCIAL HISTORY:   Significant other/partner[ ]  Children[  ]  Zoroastrian/Spirituality:  Substance hx:  [ ]   Tobacco hx:  [ ]   Alcohol hx: [ ]   Home Opioid hx:  [ ] I-Stop Reference No:  Living Situation: [ x]Home  [ ]Long term care  [ ]Rehab [ ]Other    ADVANCE DIRECTIVES:    DNR  MOLST  [ ]  Living Will  [ ]   DECISION MAKER(s):  [ ] Health Care Proxy(s)  [x ] Surrogate(s)  [ ] Guardian           Name(s): Phone Number(s):    BASELINE (I)ADL(s) (prior to admission):  [facility]: [ ]Total  [ ] Moderate [ ]Dependent    Allergies    No Known Allergies    Intolerances     PRESENT SYMPTOMS: [ ]Unable to obtain due to poor mentation   Source if other than patient:  [ ]Family   [ ]Team     Pain: [ ]yes [x ]no  QOL impact -   Location -                    Aggravating factors -  Quality -  Radiation -  Timing-  Severity (0-10 scale):  Minimal acceptable level (0-10 scale):     PAIN AD Score:     [email or web address] (press ctrl +  left click to view)    Dyspnea:                           [ ]Mild [ ]Moderate [ ]Severe  Anxiety:                             [ ]Mild [ ]Moderate [ ]Severe  Fatigue:                             [ ]Mild [ ]Moderate [ ]Severe  Nausea:                             [ ]Mild [ ]Moderate [ ]Severe  Loss of appetite:              [ ]Mild [ ]Moderate [ ]Severe  Constipation:                    [ ]Mild [ ]Moderate [ ]Severe    Other Symptoms:  [ x]All other review of systems negative     Palliative Performance Status Version 2:      40   %    [email or web address]_[email or web address]  PHYSICAL EXAM:         GENERAL:  [  ]Alert  [  ]Oriented x  3  [ ]Lethargic  [ ]Cachexia  [ ]Unarousable  [x ]Verbal  [ ]Non-Verbal  Behavioral:   [ ] Anxiety  [ ] Delirium [ ] Agitation [x ] Other Calm  HEENT:  [x ]Normal   [ ]Dry mouth   [ ]ET Tube/Trach  [ ]Oral lesions  PULMONARY:   [  ]Clear [ ]Tachypnea  [ ]Audible excessive secretions   [ ]Rhonchi        [ ]Right [ ]Left [ ]Bilateral  [ ]Crackles        [ ]Right [ ]Left [ ]Bilateral  [ ]Wheezing     [ ]Right [ ]Left [ ]Bilatera  x[x ]Diminished breath sounds [ ]right [ ]left [ ]bilateral  CARDIOVASCULAR: AICD  [ x]Regular [ ]Irregular [ ]Tachy  [ ]Juan A [ ]Murmur [ ]Other  GASTROINTESTINAL:  [ x]Soft  [ ]Distended   [ ]+BS  [x ]Non tender [ ]Tender  [ ]PEG [ ]OGT/ NGT  Last BM:   GENITOURINARY:  [ x]Normal [ ] Incontinent   [ ]Oliguria/Anuria   [ ]Esquivel  MUSCULOSKELETAL:   [  ]Normal   [  ]Weakness  [ ]Bed/Wheelchair bound [ ]Edema  NEUROLOGIC:   [ x]No focal deficits  [ ]Cognitive impairment  [ ]Dysphagia [ ]Dysarthria [ ]Paresis [ ]Other   SKIN:   [x ]Normal    [ ]Rash  [ ]Pressure ulcer(s)       Present on admission [ ]y [ ]n    CRITICAL CARE:  [ ] Shock Present  [ ]Septic [ ]Cardiogenic [ ]Neurologic [ ]Hypovolemic  [ ]  Vasopressors [ ]  Inotropes   [ ]Respiratory failure present [ ]Mechanical ventilation [ ]Non-invasive ventilatory support [ ]High flow  [ ]Acute  [ ]Chronic [ ]Hypoxic  [ ]Hypercarbic [ ]Other  [ ]Other organ failure     LABS:                        9.3    9.30  )-----------( 191      ( 09 Sep 2021 06:10 )             32.6   09-09    143  |  93<L>  |  55<H>  ----------------------------<  122<H>  3.9   |  36<H>  |  0.89    Ca    9.3      09 Sep 2021 06:10  Mg     2.0             Urinalysis Basic - ( 07 Sep 2021 18:17 )    Color: Light Orange / Appearance: Turbid / S.012 / pH: x  Gluc: x / Ketone: Negative  / Bili: Negative / Urobili: 2 mg/dL   Blood: x / Protein: 30 mg/dL / Nitrite: Negative   Leuk Esterase: Large / RBC: 3 /hpf /  /HPF   Sq Epi: x / Non Sq Epi: 6 /hpf / Bacteria: Many      RADIOLOGY & ADDITIONAL STUDIES:    PROTEIN CALORIE MALNUTRITION PRESENT: [ ]mild [ ]moderate [ ]severe [ ]underweight [ ]morbid obesity  https://www.andeal.org/vault/2440/web/files/ONC/Table_Clinical%20Characteristics%20to%20Document%20Malnutrition-White%20JV%20et%20al%2020.pdf    Height (cm): 160 (21 @ 14:57)  Weight (kg): 52.8 (21 @ 20:15), 45.4 (21 @ 14:57)  BMI (kg/m2): 20.6 (21 @ 20:15), 17.7 (21 @ 14:57)    [ ]PPSV2 < or = to 30% [ ]significant weight loss  [ ]poor nutritional intake  [ ]anasarca      [ ]Artificial Nutrition      REFERRALS:   [ ]Chaplaincy  [ ]Hospice  [ ]Child Life  [ ]Social Work  [ ]Case management [ ]Holistic Therapy     Goals of Care Document:

## 2021-09-10 NOTE — PROGRESS NOTE ADULT - SUBJECTIVE AND OBJECTIVE BOX
DATE OF SERVICE:  09/10/2021  Patient was seen and examined on 09/10/2021    .Interim events noted.Consultant notes ,Labs,Telemetry reviewed by me    PRESENTING CC:Dyspnea    HPI and HOSPITAL COURSE: 85F Brazilian-speaking w/ hx of severe MV regurgitation, HFrEF s/p AICD, chronic afib, DM2, HTN, hyperthyroidism presented initially to [facility] with acute heart failure now transferred to [facility] for further management. The patient was admitted initially to [facility] 8/26 with symptoms of sob and lower extremity swelling in setting of delaying mitral clip at [facility] reportedly planned 1mo prior. She required dobutamine for IV furosemide diuresis, completed TTE demonstrating severe MR, TR, pulmHTN, and grossly low normal left ventricular systolic function. She additionally was identified to have dysphagia and completed speech and swallow which identify need for dysphagia diet. Wound care RN additionally identified and were treating decubitus wounds which were present prior to hospitalization. Was treated with Dobutrex assisted diuresis     INTERIM EVENTS:Awake is not dyspneac       PMH -reviewed admission note, no change since admission  Heart Failure: Acute [ ] Chronic [x ] Acute on Chronic [x] Diastolic [ ] Systolic [ ] Combined Systolic and Diastolic[ ]  JUANY[ ]  ATN[ ]  CKD I [ ] CKDII [ ] CKD III [ ] CKD IV [ ] CKD V [ ] ESRD[ ]  HTN[ ] CVA[ ] DM[ ] COPD[ ] COVID[ ] AF[x ]  PPM[ ] ICD[x ]    MEDICATIONS  (STANDING):  apixaban 2.5 milliGRAM(s) Oral two times a day  ascorbic acid 500 milliGRAM(s) Oral daily  carvedilol 3.125 milliGRAM(s) Oral every 12 hours  cefTRIAXone   IVPB 1000 milliGRAM(s) IV Intermittent every 24 hours  chlorhexidine 4% Liquid 1 Application(s) Topical daily  digoxin     Tablet 125 MICROGram(s) Oral daily  furosemide    Tablet 40 milliGRAM(s) Oral daily  glucagon  Injectable 1 milliGRAM(s) IntraMuscular once  insulin lispro (ADMELOG) corrective regimen sliding scale   SubCutaneous three times a day before meals  insulin lispro (ADMELOG) corrective regimen sliding scale   SubCutaneous at bedtime  multivitamin 1 Tablet(s) Oral daily  nystatin Powder 1 Application(s) Topical two times a day  pantoprazole    Tablet 40 milliGRAM(s) Oral before breakfast  polyethylene glycol 3350 17 Gram(s) Oral daily  sacubitril 24 mG/valsartan 26 mG 1 Tablet(s) Oral two times a day  senna 2 Tablet(s) Oral at bedtime  spironolactone 25 milliGRAM(s) Oral daily    MEDICATIONS  (PRN):  acetaminophen   Tablet .. 650 milliGRAM(s) Oral every 6 hours PRN Temp greater or equal to 38C (100.4F), Mild Pain (1 - 3)  benzocaine 15 mG/menthol 3.6 mG (Sugar-Free) Lozenge 1 Lozenge Oral every 2 hours PRN Sore Throat  haloperidol    Injectable 0.25 milliGRAM(s) IV Push every 8 hours PRN Agitation  hemorrhoidal Ointment 1 Application(s) Rectal four times a day PRN rectal irritation            REVIEW OF SYSTEMS:  Constitutional: [ ] fever, [ ]weight loss,  [x ]fatigue  Eyes: [ ] visual changes  Respiratory: [ ]shortness of breath;  [ ] cough, [ ]wheezing, [ ]chills, [ ]hemoptysis  Cardiovascular: [ ] chest pain, [ ]palpitations, [ ]dizziness,  [ ]leg swelling[ ]orthopnea[ ]PND  Gastrointestinal: [ ] abdominal pain, [ ]nausea, [ ]vomiting,  [ ]diarrhea [ ]Constipation [ ]Melena  Genitourinary: [ ] dysuria, [ ] hematuria [ ]Esquivel  Neurologic: [ ] headaches [ ] tremors[ ]weakness [ ]Paralysis Right[ ] Left[ ]  Skin: [ ] itching, [ ]burning, [ ] rashes  Endocrine: [ ] heat or cold intolerance  Musculoskeletal: [ ] joint pain or swelling; [ ] muscle, back, or extremity pain  Psychiatric: [ ] depression, [ ]anxiety, [ ]mood swings, or [ ]difficulty sleeping  Hematologic: [ ] easy bruising, [ ] bleeding gums    [x] All remaining systems negative except as per above.   [ ]Unable to obtain.    Vital Signs Last 24 Hrs  T(C): 36.3 (10 Sep 2021 08:01), Max: 36.7 (09 Sep 2021 11:10)  T(F): 97.4 (10 Sep 2021 08:01), Max: 98.1 (09 Sep 2021 11:10)  HR: 104 (10 Sep 2021 08:01) (70 - 104)  BP: 102/67 (10 Sep 2021 08:01) (96/60 - 119/69)  RR: 18 (10 Sep 2021 08:01) (18 - 18)  SpO2: 92% (10 Sep 2021 08:01) (90% - 95%)  I&O's Summary    09 Sep 2021 07:01  -  10 Sep 2021 07:00  --------------------------------------------------------  IN: 600 mL / OUT: 700 mL / NET: -100 mL        PHYSICAL EXAM:  General: No acute distress BMI-24  HEENT: EOMI, PERRL  Neck: Supple, [ ] JVD  Lungs: Equal air entry bilaterally; [ ] rales [ ] wheezing [ ] rhonchi  Heart: Irregular rate and rhythm; [x] murmur   3/6 [x ] systolic [ ] diastolic [ ] radiation[ ] rubs [ ]  gallops  Abdomen: Nontender, bowel sounds present  Extremities: No clubbing, cyanosis, [ ] edema [ ]Pulses  equal and intact  Nervous system:  Alert & Oriented X3, no focal deficits  Psychiatric: Normal affect  Skin: No rashes or lesions    LABS:  09-10    141  |  94<L>  |  61<H>  ----------------------------<  196<H>  3.9   |  36<H>  |  1.11    Ca    9.0      10 Sep 2021 06:30  Mg     2.0     09-09      Creatinine Trend: 1.11<--, 0.89<--, 0.91<--, 0.94<--, 0.98<--, 0.97<--                        7.4    8.13  )-----------( 158      ( 10 Sep 2021 06:31 )             26.6       Culture - Urine (09.08.21 @ 13:24)   Specimen Source: Clean Catch Clean Catch (Midstream)   Culture Results: >=3 organisms. Probable collection contamination.     TELEMETRY:Reviewed monitor tracings-BiV paced rhythm     ECHO:Study Date: 8/27/2021  CONCLUSIONS:  1. Moderate to severe, eccentric postetriorly directed mitral regurgitation.  2.  Mild aortic regurgitation.  3. Severely dilated left atrium.  LA volume index = 95 cc/m2.  4. Normal left ventricular internal dimensions and wall thicknesses.  5. Endocardium not well visualized; grossly low normal left ventricular systolic function.   Segmental wallmotion could not be assessed.EF 55%  6. Unable to asses diastolic function due to technical aspects of this study.  7. Normal right ventricular size and function.   A device lead is visualized in the right heart.  8. RV systolic pressure is 62 mm Hg. Severe pulmonary hypertension.  9. There is severe tricuspid regurgitation.    IMPRESSION AND PLAN:  87 YO F with with a history of ACC/AHA Stage C NICM with improved LVEF (LV 4.9 cm, LVEF 40-45%) s/p CRT-D, severe mitral regurgitation being considered for MitraClip at [facility], chronic AF/AFl, and DM2 who was admitted to [facility] with ADHF and transferred to [facility] for consideration of MitraClip    Problem/Plan - 1:  ·  Problem: Acute decompensated heart failure.   ·  Plan: - GDMT: continue losartan 25 mg daily and spironolactone 25 mg daily. Will start BB when euvolemic and uptitrate losartan as tolerates   - Device: s/p CRT with 92% BiV pacing  -Discussed GOC with tanner Shaw explained Ms Ryan overall frail condition,albeit improved LV EF her condition and prognosis is poor-considering discharge to Home with Home care.  -On Furosemide PO  .[place] and Losartan  -Tolerating Coreg    Problem/Plan - 2:  ·  Problem: Severe mitral valve regurgitation.   ·  Plan: - Structural cardiology consulted for symptomatic severe MR, though in discussions with the team - the anatomic limitations of her mitral valve plus significant frailty would make her a poor candidate for any intervention.    Problem/Plan - 3:  ·  Problem: Chronic atrial fibrillation.   ·  Plan: - continue digoxin and systemic a/c, on therapeutic lovenox per cardiology. digoxin level normal.  -Resumed Eliquis      Problem/Plan - 4:  ·  Problem: Transaminitis.   ·  Plan: - due to congestion and resolved with diuresis.    Problem/Plan - 5:  ·  Problem: JUANY (acute kidney injury).   ·  Plan: - resolved with diuresis.       DATE OF SERVICE:  09/10/2021  Patient was seen and examined on 09/10/2021    .Interim events noted.Consultant notes ,Labs,Telemetry reviewed by me    PRESENTING CC:Dyspnea    HPI and HOSPITAL COURSE: 85F Puerto Rican-speaking w/ hx of severe MV regurgitation, HFrEF s/p AICD, chronic afib, DM2, HTN, hyperthyroidism presented initially to [facility] with acute heart failure now transferred to [facility] for further management. The patient was admitted initially to [facility] 8/26 with symptoms of sob and lower extremity swelling in setting of delaying mitral clip at [facility] reportedly planned 1mo prior. She required dobutamine for IV furosemide diuresis, completed TTE demonstrating severe MR, TR, pulmHTN, and grossly low normal left ventricular systolic function. She additionally was identified to have dysphagia and completed speech and swallow which identify need for dysphagia diet. Wound care RN additionally identified and were treating decubitus wounds which were present prior to hospitalization. Was treated with Dobutrex assisted diuresis     INTERIM EVENTS:Awake is not dyspneac       PMH -reviewed admission note, no change since admission  Heart Failure: Acute [ ] Chronic [x ] Acute on Chronic [x] Diastolic [ ] Systolic [ ] Combined Systolic and Diastolic[ ]  JUANY[ ]  ATN[ ]  CKD I [ ] CKDII [ ] CKD III [ ] CKD IV [ ] CKD V [ ] ESRD[ ]  HTN[ ] CVA[ ] DM[ ] COPD[ ] COVID[ ] AF[x ]  PPM[ ] ICD[x ]    MEDICATIONS  (STANDING):  apixaban 2.5 milliGRAM(s) Oral two times a day  ascorbic acid 500 milliGRAM(s) Oral daily  carvedilol 3.125 milliGRAM(s) Oral every 12 hours  cefTRIAXone   IVPB 1000 milliGRAM(s) IV Intermittent every 24 hours  chlorhexidine 4% Liquid 1 Application(s) Topical daily  digoxin     Tablet 125 MICROGram(s) Oral daily  furosemide    Tablet 40 milliGRAM(s) Oral daily  glucagon  Injectable 1 milliGRAM(s) IntraMuscular once  insulin lispro (ADMELOG) corrective regimen sliding scale   SubCutaneous three times a day before meals  insulin lispro (ADMELOG) corrective regimen sliding scale   SubCutaneous at bedtime  multivitamin 1 Tablet(s) Oral daily  nystatin Powder 1 Application(s) Topical two times a day  pantoprazole    Tablet 40 milliGRAM(s) Oral before breakfast  polyethylene glycol 3350 17 Gram(s) Oral daily  sacubitril 24 mG/valsartan 26 mG 1 Tablet(s) Oral two times a day  senna 2 Tablet(s) Oral at bedtime  spironolactone 25 milliGRAM(s) Oral daily    MEDICATIONS  (PRN):  acetaminophen   Tablet .. 650 milliGRAM(s) Oral every 6 hours PRN Temp greater or equal to 38C (100.4F), Mild Pain (1 - 3)  benzocaine 15 mG/menthol 3.6 mG (Sugar-Free) Lozenge 1 Lozenge Oral every 2 hours PRN Sore Throat  haloperidol    Injectable 0.25 milliGRAM(s) IV Push every 8 hours PRN Agitation  hemorrhoidal Ointment 1 Application(s) Rectal four times a day PRN rectal irritation            REVIEW OF SYSTEMS:  Constitutional: [ ] fever, [ ]weight loss,  [x ]fatigue  Eyes: [ ] visual changes  Respiratory: [ ]shortness of breath;  [ ] cough, [ ]wheezing, [ ]chills, [ ]hemoptysis  Cardiovascular: [ ] chest pain, [ ]palpitations, [ ]dizziness,  [ ]leg swelling[ ]orthopnea[ ]PND  Gastrointestinal: [ ] abdominal pain, [ ]nausea, [ ]vomiting,  [ ]diarrhea [ ]Constipation [ ]Melena  Genitourinary: [ ] dysuria, [ ] hematuria [ ]Esquivel  Neurologic: [ ] headaches [ ] tremors[ ]weakness [ ]Paralysis Right[ ] Left[ ]  Skin: [ ] itching, [ ]burning, [ ] rashes  Endocrine: [ ] heat or cold intolerance  Musculoskeletal: [ ] joint pain or swelling; [ ] muscle, back, or extremity pain  Psychiatric: [ ] depression, [ ]anxiety, [ ]mood swings, or [ ]difficulty sleeping  Hematologic: [ ] easy bruising, [ ] bleeding gums    [x] All remaining systems negative except as per above.   [ ]Unable to obtain.    Vital Signs Last 24 Hrs  T(C): 36.3 (10 Sep 2021 08:01), Max: 36.7 (09 Sep 2021 11:10)  T(F): 97.4 (10 Sep 2021 08:01), Max: 98.1 (09 Sep 2021 11:10)  HR: 104 (10 Sep 2021 08:01) (70 - 104)  BP: 102/67 (10 Sep 2021 08:01) (96/60 - 119/69)  RR: 18 (10 Sep 2021 08:01) (18 - 18)  SpO2: 92% (10 Sep 2021 08:01) (90% - 95%)  I&O's Summary    09 Sep 2021 07:01  -  10 Sep 2021 07:00  --------------------------------------------------------  IN: 600 mL / OUT: 700 mL / NET: -100 mL        PHYSICAL EXAM:  General: No acute distress BMI-24  HEENT: EOMI, PERRL  Neck: Supple, [ ] JVD  Lungs: Equal air entry bilaterally; [ ] rales [ ] wheezing [ ] rhonchi  Heart: Irregular rate and rhythm; [x] murmur   3/6 [x ] systolic [ ] diastolic [ ] radiation[ ] rubs [ ]  gallops  Abdomen: Nontender, bowel sounds present  Extremities: No clubbing, cyanosis, [ ] edema [ ]Pulses  equal and intact  Nervous system:  Alert & Oriented X3, no focal deficits  Psychiatric: Normal affect  Skin: No rashes or lesions    LABS:  09-10    141  |  94<L>  |  61<H>  ----------------------------<  196<H>  3.9   |  36<H>  |  1.11    Ca    9.0      10 Sep 2021 06:30  Mg     2.0     09-09      Creatinine Trend: 1.11<--, 0.89<--, 0.91<--, 0.94<--, 0.98<--, 0.97<--                        7.4    8.13  )-----------( 158      ( 10 Sep 2021 06:31 )             26.6       Culture - Urine (09.08.21 @ 13:24)   Specimen Source: Clean Catch Clean Catch (Midstream)   Culture Results: >=3 organisms. Probable collection contamination.     TELEMETRY:Reviewed monitor tracings-BiV paced rhythm     ECHO:Study Date: 8/27/2021  CONCLUSIONS:  1. Moderate to severe, eccentric postetriorly directed mitral regurgitation.  2.  Mild aortic regurgitation.  3. Severely dilated left atrium.  LA volume index = 95 cc/m2.  4. Normal left ventricular internal dimensions and wall thicknesses.  5. Endocardium not well visualized; grossly low normal left ventricular systolic function.   Segmental wallmotion could not be assessed.EF 55%  6. Unable to asses diastolic function due to technical aspects of this study.  7. Normal right ventricular size and function.   A device lead is visualized in the right heart.  8. RV systolic pressure is 62 mm Hg. Severe pulmonary hypertension.  9. There is severe tricuspid regurgitation.    IMPRESSION AND PLAN:  87 YO F with with a history of ACC/AHA Stage C NICM with improved LVEF (LV 4.9 cm, LVEF 40-45%) s/p CRT-D, severe mitral regurgitation being considered for MitraClip at [facility], chronic AF/AFl, and DM2 who was admitted to [facility] with ADHF and transferred to [facility] for consideration of MitraClip    Problem/Plan - 1:  ·  Problem: Acute decompensated heart failure.   ·  Plan: - GDMT: continue losartan 25 mg daily and spironolactone 25 mg daily. Will start BB when euvolemic and uptitrate losartan as tolerates   - Device: s/p CRT with 92% BiV pacing  -Discussed GOC with tanner Shaw explained Ms Ryan overall frail condition,albeit improved LV EF her condition and prognosis is poor-considering discharge to Home with Home care.  -On Furosemide PO  .[place] and Entresto  -Tolerating Coreg    Problem/Plan - 2:  ·  Problem: Severe mitral valve regurgitation.   ·  Plan: - Structural cardiology consulted for symptomatic severe MR, though in discussions with the team - the anatomic limitations of her mitral valve plus significant frailty would make her a poor candidate for any intervention.    Problem/Plan - 3:  ·  Problem: Chronic atrial fibrillation.   ·  Plan: - continue digoxin and systemic a/c, on therapeutic lovenox per cardiology. digoxin level normal.  -Resumed Eliquis      Problem/Plan - 4:  ·  Problem: Transaminitis.   ·  Plan: - due to congestion and resolved with diuresis.    Problem/Plan - 5:  ·  Problem: JUANY (acute kidney injury).   ·  Plan: - resolved with diuresis.

## 2021-09-11 LAB
ANION GAP SERPL CALC-SCNC: 13 MMOL/L — SIGNIFICANT CHANGE UP (ref 5–17)
BUN SERPL-MCNC: 48 MG/DL — HIGH (ref 7–23)
CALCIUM SERPL-MCNC: 9 MG/DL — SIGNIFICANT CHANGE UP (ref 8.4–10.5)
CHLORIDE SERPL-SCNC: 94 MMOL/L — LOW (ref 96–108)
CO2 SERPL-SCNC: 35 MMOL/L — HIGH (ref 22–31)
CREAT SERPL-MCNC: 0.82 MG/DL — SIGNIFICANT CHANGE UP (ref 0.5–1.3)
GLUCOSE BLDC GLUCOMTR-MCNC: 142 MG/DL — HIGH (ref 70–99)
GLUCOSE BLDC GLUCOMTR-MCNC: 145 MG/DL — HIGH (ref 70–99)
GLUCOSE BLDC GLUCOMTR-MCNC: 176 MG/DL — HIGH (ref 70–99)
GLUCOSE BLDC GLUCOMTR-MCNC: 210 MG/DL — HIGH (ref 70–99)
GLUCOSE SERPL-MCNC: 134 MG/DL — HIGH (ref 70–99)
HCT VFR BLD CALC: 33.9 % — LOW (ref 34.5–45)
HGB BLD-MCNC: 9.7 G/DL — LOW (ref 11.5–15.5)
MCHC RBC-ENTMCNC: 22.5 PG — LOW (ref 27–34)
MCHC RBC-ENTMCNC: 28.6 GM/DL — LOW (ref 32–36)
MCV RBC AUTO: 78.5 FL — LOW (ref 80–100)
NRBC # BLD: 0 /100 WBCS — SIGNIFICANT CHANGE UP (ref 0–0)
PLATELET # BLD AUTO: 201 K/UL — SIGNIFICANT CHANGE UP (ref 150–400)
POTASSIUM SERPL-MCNC: 3.8 MMOL/L — SIGNIFICANT CHANGE UP (ref 3.5–5.3)
POTASSIUM SERPL-SCNC: 3.8 MMOL/L — SIGNIFICANT CHANGE UP (ref 3.5–5.3)
RBC # BLD: 4.32 M/UL — SIGNIFICANT CHANGE UP (ref 3.8–5.2)
RBC # FLD: 18 % — HIGH (ref 10.3–14.5)
SODIUM SERPL-SCNC: 142 MMOL/L — SIGNIFICANT CHANGE UP (ref 135–145)
WBC # BLD: 10.45 K/UL — SIGNIFICANT CHANGE UP (ref 3.8–10.5)
WBC # FLD AUTO: 10.45 K/UL — SIGNIFICANT CHANGE UP (ref 3.8–10.5)

## 2021-09-11 RX ADMIN — APIXABAN 2.5 MILLIGRAM(S): 2.5 TABLET, FILM COATED ORAL at 16:34

## 2021-09-11 RX ADMIN — Medication 500 MILLIGRAM(S): at 11:38

## 2021-09-11 RX ADMIN — NYSTATIN CREAM 1 APPLICATION(S): 100000 CREAM TOPICAL at 16:34

## 2021-09-11 RX ADMIN — SACUBITRIL AND VALSARTAN 1 TABLET(S): 24; 26 TABLET, FILM COATED ORAL at 16:34

## 2021-09-11 RX ADMIN — APIXABAN 2.5 MILLIGRAM(S): 2.5 TABLET, FILM COATED ORAL at 06:24

## 2021-09-11 RX ADMIN — Medication 1 TABLET(S): at 11:38

## 2021-09-11 RX ADMIN — SPIRONOLACTONE 25 MILLIGRAM(S): 25 TABLET, FILM COATED ORAL at 07:42

## 2021-09-11 RX ADMIN — SACUBITRIL AND VALSARTAN 1 TABLET(S): 24; 26 TABLET, FILM COATED ORAL at 06:24

## 2021-09-11 RX ADMIN — PANTOPRAZOLE SODIUM 40 MILLIGRAM(S): 20 TABLET, DELAYED RELEASE ORAL at 06:24

## 2021-09-11 RX ADMIN — CHLORHEXIDINE GLUCONATE 1 APPLICATION(S): 213 SOLUTION TOPICAL at 11:52

## 2021-09-11 RX ADMIN — NYSTATIN CREAM 1 APPLICATION(S): 100000 CREAM TOPICAL at 06:24

## 2021-09-11 RX ADMIN — Medication 1: at 11:52

## 2021-09-11 RX ADMIN — Medication 125 MICROGRAM(S): at 06:24

## 2021-09-11 RX ADMIN — CARVEDILOL PHOSPHATE 3.12 MILLIGRAM(S): 80 CAPSULE, EXTENDED RELEASE ORAL at 16:34

## 2021-09-11 RX ADMIN — POLYETHYLENE GLYCOL 3350 17 GRAM(S): 17 POWDER, FOR SOLUTION ORAL at 11:39

## 2021-09-11 RX ADMIN — Medication 40 MILLIGRAM(S): at 07:42

## 2021-09-11 RX ADMIN — SENNA PLUS 2 TABLET(S): 8.6 TABLET ORAL at 22:03

## 2021-09-11 NOTE — PROGRESS NOTE ADULT - SUBJECTIVE AND OBJECTIVE BOX
DATE OF SERVICE:  09/11/2021  Patient was seen and examined on 09/11/2021    .Interim events noted.Consultant notes ,Labs,Telemetry reviewed by me    PRESENTING CC:Dyspnea    HPI and HOSPITAL COURSE:  85F Iraqi-speaking w/ hx of severe MV regurgitation, HFrEF s/p AICD, chronic afib, DM2, HTN, hyperthyroidism presented initially to [facility] with acute heart failure now transferred to [facility] for further management. The patient was admitted initially to [facility] 8/26 with symptoms of sob and lower extremity swelling in setting of delaying mitral clip at [facility] reportedly planned 1mo prior. She required dobutamine for IV furosemide diuresis, completed TTE demonstrating severe MR, TR, pulmHTN, and grossly low normal left ventricular systolic function. She additionally was identified to have dysphagia and completed speech and swallow which identify need for dysphagia diet. Wound care RN additionally identified and were treating decubitus wounds which were present prior to hospitalization. Was treated with Dobutrex assisted diuresis       INTERIM EVENTS:Awake no overnight events not dyspneac      PMH -reviewed admission note, no change since admission  Heart Failure: Acute [ ] Chronic [ ] Acute on Chronic [x ] Diastolic [ ] Systolic [ x] Combined Systolic and Diastolic[ ]  JUANY[ ]  ATN[ ]  CKD I [ ] CKDII [ ] CKD III [ ] CKD IV [ ] CKD V [ ] ESRD[ ]  HTN[ ] CVA[ ] DM[ ] COPD[ ] COVID[ ] AF[x ]  PPM[ ] ICD[x ]    MEDICATIONS  (STANDING):  apixaban 2.5 milliGRAM(s) Oral two times a day  ascorbic acid 500 milliGRAM(s) Oral daily  carvedilol 3.125 milliGRAM(s) Oral every 12 hours  chlorhexidine 4% Liquid 1 Application(s) Topical daily  furosemide    Tablet 40 milliGRAM(s) Oral daily  glucagon  Injectable 1 milliGRAM(s) IntraMuscular once  insulin lispro (ADMELOG) corrective regimen sliding scale   SubCutaneous three times a day before meals  insulin lispro (ADMELOG) corrective regimen sliding scale   SubCutaneous at bedtime  multivitamin 1 Tablet(s) Oral daily  nystatin Powder 1 Application(s) Topical two times a day  pantoprazole    Tablet 40 milliGRAM(s) Oral before breakfast  polyethylene glycol 3350 17 Gram(s) Oral daily  sacubitril 24 mG/valsartan 26 mG 1 Tablet(s) Oral two times a day  senna 2 Tablet(s) Oral at bedtime  spironolactone 25 milliGRAM(s) Oral daily    MEDICATIONS  (PRN):  acetaminophen   Tablet .. 650 milliGRAM(s) Oral every 6 hours PRN Temp greater or equal to 38C (100.4F), Mild Pain (1 - 3)  benzocaine 15 mG/menthol 3.6 mG (Sugar-Free) Lozenge 1 Lozenge Oral every 2 hours PRN Sore Throat  haloperidol    Injectable 0.25 milliGRAM(s) IV Push every 8 hours PRN Agitation  hemorrhoidal Ointment 1 Application(s) Rectal four times a day PRN rectal irritation            REVIEW OF SYSTEMS:  Constitutional: [ ] fever, [ ]weight loss,  [x ]fatigue  Eyes: [ ] visual changes  Respiratory: [ ]shortness of breath;  [ ] cough, [ ]wheezing, [ ]chills, [ ]hemoptysis  Cardiovascular: [ ] chest pain, [ ]palpitations, [ ]dizziness,  [ ]leg swelling[ ]orthopnea[ ]PND  Gastrointestinal: [ ] abdominal pain, [ ]nausea, [ ]vomiting,  [ ]diarrhea [ ]Constipation [ ]Melena  Genitourinary: [ ] dysuria, [ ] hematuria [ ]Esquivel  Neurologic: [ ] headaches [ ] tremors[ ]weakness [ ]Paralysis Right[ ] Left[ ]  Skin: [ ] itching, [ ]burning, [ ] rashes  Endocrine: [ ] heat or cold intolerance  Musculoskeletal: [ ] joint pain or swelling; [ ] muscle, back, or extremity pain  Psychiatric: [ ] depression, [ ]anxiety, [ ]mood swings, or [ ]difficulty sleeping  Hematologic: [ ] easy bruising, [ ] bleeding gums    [x] All remaining systems negative except as per above.   [ ]Unable to obtain.          PHYSICAL EXAM:  General: No acute distress BMI-26  HEENT: EOMI, PERRL  Neck: Supple, [ ] JVD  Lungs: Equal air entry bilaterally; [ ] rales [ ] wheezing [ ] rhonchi  Heart: Regular rate and rhythm; [x ] murmur   2/6 [x ] systolic [ ] diastolic [ ] radiation[ ] rubs [ ]  gallops  Abdomen: Nontender, bowel sounds present  Extremities: No clubbing, cyanosis, [ x] edema [ ]Pulses  equal and intact  Nervous system:  Alert & Oriented X3, no focal deficits  Psychiatric: Normal affect  Skin: No rashes or lesions    LABS:      Creatinine Trend:  0.82<--, 1.11<--, 0.89<--, 0.91<--, 0.94<--                        9.7    10.45 )-----------( 201      ( 11 Sep 2021 06:46 )             33.9           IMPRESSION AND PLAN:  85 YO F with with a history of ACC/AHA Stage C NICM with improved LVEF (LV 4.9 cm, LVEF 40-45%) s/p CRT-D, severe mitral regurgitation being considered for MitraClip at [facility], chronic AF/AFl, and DM2 who was admitted to [facility] with ADHF and transferred to [facility] for consideration of MitraClip    Problem/Plan - 1:  ·  Problem: Acute decompensated heart failure.   ·  Plan: - GDMT: continue losartan 25 mg daily and spironolactone 25 mg daily. Will start BB when euvolemic and uptitrate losartan as tolerates   - Device: s/p CRT with 92% BiV pacing  -Discussed GOC with tanner Shaw explained Ms Ryan overall frail condition,albeit improved LV EF her condition and prognosis is poor-considering discharge to Home with Home care.  -On Furosemide PO  .[place] and Entresto  -Tolerating Coreg  -Awaiting discharge to SNF    Problem/Plan - 2:  ·  Problem: Severe mitral valve regurgitation.   ·  Plan: - Structural cardiology consulted for symptomatic severe MR, though in discussions with the team - the anatomic limitations of her mitral valve plus significant frailty would make her a poor candidate for any intervention.    Problem/Plan - 3:  ·  Problem: Chronic atrial fibrillation.   ·  Plan: - continue digoxin and systemic a/c, on therapeutic lovenox per cardiology. digoxin level normal.  -Resumed Eliquis      Problem/Plan - 4:  ·  Problem: Transaminitis.   ·  Plan: - due to congestion and resolved with diuresis.    Problem/Plan - 5:  ·  Problem: JUANY (acute kidney injury).   ·  Plan: - resolved with diuresis.

## 2021-09-12 LAB
ANION GAP SERPL CALC-SCNC: 12 MMOL/L — SIGNIFICANT CHANGE UP (ref 5–17)
BUN SERPL-MCNC: 45 MG/DL — HIGH (ref 7–23)
CALCIUM SERPL-MCNC: 8.8 MG/DL — SIGNIFICANT CHANGE UP (ref 8.4–10.5)
CHLORIDE SERPL-SCNC: 96 MMOL/L — SIGNIFICANT CHANGE UP (ref 96–108)
CO2 SERPL-SCNC: 36 MMOL/L — HIGH (ref 22–31)
CREAT SERPL-MCNC: 0.81 MG/DL — SIGNIFICANT CHANGE UP (ref 0.5–1.3)
GLUCOSE BLDC GLUCOMTR-MCNC: 132 MG/DL — HIGH (ref 70–99)
GLUCOSE BLDC GLUCOMTR-MCNC: 153 MG/DL — HIGH (ref 70–99)
GLUCOSE BLDC GLUCOMTR-MCNC: 160 MG/DL — HIGH (ref 70–99)
GLUCOSE BLDC GLUCOMTR-MCNC: 167 MG/DL — HIGH (ref 70–99)
GLUCOSE SERPL-MCNC: 112 MG/DL — HIGH (ref 70–99)
MAGNESIUM SERPL-MCNC: 2 MG/DL — SIGNIFICANT CHANGE UP (ref 1.6–2.6)
PHOSPHATE SERPL-MCNC: 3 MG/DL — SIGNIFICANT CHANGE UP (ref 2.5–4.5)
POTASSIUM SERPL-MCNC: 3.8 MMOL/L — SIGNIFICANT CHANGE UP (ref 3.5–5.3)
POTASSIUM SERPL-SCNC: 3.8 MMOL/L — SIGNIFICANT CHANGE UP (ref 3.5–5.3)
SODIUM SERPL-SCNC: 144 MMOL/L — SIGNIFICANT CHANGE UP (ref 135–145)

## 2021-09-12 RX ADMIN — APIXABAN 2.5 MILLIGRAM(S): 2.5 TABLET, FILM COATED ORAL at 05:43

## 2021-09-12 RX ADMIN — Medication 40 MILLIGRAM(S): at 05:43

## 2021-09-12 RX ADMIN — NYSTATIN CREAM 1 APPLICATION(S): 100000 CREAM TOPICAL at 18:08

## 2021-09-12 RX ADMIN — CHLORHEXIDINE GLUCONATE 1 APPLICATION(S): 213 SOLUTION TOPICAL at 12:26

## 2021-09-12 RX ADMIN — APIXABAN 2.5 MILLIGRAM(S): 2.5 TABLET, FILM COATED ORAL at 18:07

## 2021-09-12 RX ADMIN — Medication 125 MICROGRAM(S): at 05:43

## 2021-09-12 RX ADMIN — SACUBITRIL AND VALSARTAN 1 TABLET(S): 24; 26 TABLET, FILM COATED ORAL at 05:43

## 2021-09-12 RX ADMIN — SACUBITRIL AND VALSARTAN 1 TABLET(S): 24; 26 TABLET, FILM COATED ORAL at 18:08

## 2021-09-12 RX ADMIN — POLYETHYLENE GLYCOL 3350 17 GRAM(S): 17 POWDER, FOR SOLUTION ORAL at 12:23

## 2021-09-12 RX ADMIN — Medication 1: at 17:07

## 2021-09-12 RX ADMIN — CARVEDILOL PHOSPHATE 3.12 MILLIGRAM(S): 80 CAPSULE, EXTENDED RELEASE ORAL at 18:07

## 2021-09-12 RX ADMIN — PANTOPRAZOLE SODIUM 40 MILLIGRAM(S): 20 TABLET, DELAYED RELEASE ORAL at 05:43

## 2021-09-12 RX ADMIN — SPIRONOLACTONE 25 MILLIGRAM(S): 25 TABLET, FILM COATED ORAL at 05:43

## 2021-09-12 RX ADMIN — NYSTATIN CREAM 1 APPLICATION(S): 100000 CREAM TOPICAL at 05:43

## 2021-09-12 RX ADMIN — SENNA PLUS 2 TABLET(S): 8.6 TABLET ORAL at 22:11

## 2021-09-12 RX ADMIN — Medication 1 TABLET(S): at 12:22

## 2021-09-12 RX ADMIN — Medication 500 MILLIGRAM(S): at 12:22

## 2021-09-12 RX ADMIN — Medication 1: at 12:22

## 2021-09-12 NOTE — PROGRESS NOTE ADULT - SUBJECTIVE AND OBJECTIVE BOX
DATE OF SERVICE:  09/12/2021  Patient was seen and examined on 09/12/2021    .Interim events noted.Consultant notes ,Labs,Telemetry reviewed by me    PRESENTING CC:Dyspnea    HPI and HOSPITAL COURSE: 85F [place]er-speaking w/ hx of severe MV regurgitation, HFrEF s/p AICD, chronic afib, DM2, HTN, hyperthyroidism presented initially to [facility] with acute heart failure now transferred to [facility] for further management. The patient was admitted initially to [facility] 8/26 with symptoms of sob and lower extremity swelling in setting of delaying mitral clip at [facility] reportedly planned 1mo prior. She required dobutamine for IV furosemide diuresis, completed TTE demonstrating severe MR, TR, pulmHTN, and grossly low normal left ventricular systolic function. She additionally was identified to have dysphagia and completed speech and swallow which identify need for dysphagia diet. Wound care RN additionally identified and were treating decubitus wounds which were present prior to hospitalization. Was treated with Dobutrex assisted diuresis       INTERIM EVENTS:Awake no complaints       PMH -reviewed admission note, no change since admission  Heart Failure: Acute [ ] Chronic [ ] Acute on Chronic [x ] Diastolic [ x] Systolic [x ] Combined Systolic and Diastolic[ ]  JUANY[ ]  ATN[ ]  CKD I [ ] CKDII [ ] CKD III [ ] CKD IV [ ] CKD V [ ] ESRD[ ]  HTN[x ] CVA[ ] DM[ ] COPD[ ] COVID[ ] AF[ ]  PPM[ ] ICD[x ]    MEDICATIONS  (STANDING):  apixaban 2.5 milliGRAM(s) Oral two times a day  ascorbic acid 500 milliGRAM(s) Oral daily  carvedilol 3.125 milliGRAM(s) Oral every 12 hours  chlorhexidine 4% Liquid 1 Application(s) Topical daily  digoxin     Tablet 125 MICROGram(s) Oral daily  furosemide    Tablet 40 milliGRAM(s) Oral daily  glucagon  Injectable 1 milliGRAM(s) IntraMuscular once  insulin lispro (ADMELOG) corrective regimen sliding scale   SubCutaneous three times a day before meals  insulin lispro (ADMELOG) corrective regimen sliding scale   SubCutaneous at bedtime  multivitamin 1 Tablet(s) Oral daily  nystatin Powder 1 Application(s) Topical two times a day  pantoprazole    Tablet 40 milliGRAM(s) Oral before breakfast  polyethylene glycol 3350 17 Gram(s) Oral daily  sacubitril 24 mG/valsartan 26 mG 1 Tablet(s) Oral two times a day  senna 2 Tablet(s) Oral at bedtime  spironolactone 25 milliGRAM(s) Oral daily    MEDICATIONS  (PRN):  acetaminophen   Tablet .. 650 milliGRAM(s) Oral every 6 hours PRN Temp greater or equal to 38C (100.4F), Mild Pain (1 - 3)  benzocaine 15 mG/menthol 3.6 mG (Sugar-Free) Lozenge 1 Lozenge Oral every 2 hours PRN Sore Throat  haloperidol    Injectable 0.25 milliGRAM(s) IV Push every 8 hours PRN Agitation  hemorrhoidal Ointment 1 Application(s) Rectal four times a day PRN rectal irritation            REVIEW OF SYSTEMS:  Constitutional: [ ] fever, [ ]weight loss,  [x ]fatigue  Eyes: [ ] visual changes  Respiratory: [ ]shortness of breath;  [ ] cough, [ ]wheezing, [ ]chills, [ ]hemoptysis  Cardiovascular: [ ] chest pain, [ ]palpitations, [ ]dizziness,  [ ]leg swelling[ ]orthopnea[ ]PND  Gastrointestinal: [ ] abdominal pain, [ ]nausea, [ ]vomiting,  [ ]diarrhea [ ]Constipation [ ]Melena  Genitourinary: [ ] dysuria, [ ] hematuria [ ]Esquivel  Neurologic: [ ] headaches [ ] tremors[ ]weakness [ ]Paralysis Right[ ] Left[ ]  Skin: [ ] itching, [ ]burning, [ ] rashes  Endocrine: [ ] heat or cold intolerance  Musculoskeletal: [ ] joint pain or swelling; [ ] muscle, back, or extremity pain  Psychiatric: [ ] depression, [ ]anxiety, [ ]mood swings, or [ ]difficulty sleeping  Hematologic: [ ] easy bruising, [ ] bleeding gums    [x] All remaining systems negative except as per above.   [ ]Unable to obtain.          PHYSICAL EXAM:  General: No acute distress BMI-26  HEENT: EOMI, PERRL  Neck: Supple, [ ] JVD  Lungs: Equal air entry bilaterally; [ ] rales [ ] wheezing [ ] rhonchi  Heart: Regular rate and rhythm; [x ] murmur   2/6 [x ] systolic [ ] diastolic [ ] radiation[ ] rubs [ ]  gallops  Abdomen: Nontender, bowel sounds present  Extremities: No clubbing, cyanosis, [v ] edema [ ]Pulses  equal and intact  Nervous system:  Alert & Oriented X3, no focal deficits  Psychiatric: Normal affect  Skin: No rashes or lesions    LABS:  09-12    144  |  96  |  45<H>  ----------------------------<  112<H>  3.8   |  36<H>  |  0.81    Ca    8.8      12 Sep 2021 06:03  Phos  3.0     09-12  Mg     2.0     09-12      Creatinine Trend: 0.81<--, 0.82<--, 1.11<--, 0.89<--, 0.91<--, 0.94<--            IMPRESSION AND PLAN:  87 YO F with with a history of ACC/AHA Stage C NICM with improved LVEF (LV 4.9 cm, LVEF 40-45%) s/p CRT-D, severe mitral regurgitation being considered for MitraClip at [facility], chronic AF/AFl, and DM2 who was admitted to [facility] with ADHF and transferred to [facility] for consideration of MitraClip    Problem/Plan - 1:  ·  Problem: Acute decompensated heart failure.   ·  Plan: - GDMT: continue losartan 25 mg daily and spironolactone 25 mg daily. Will start BB when euvolemic and uptitrate losartan as tolerates   - Device: s/p CRT with 92% BiV pacing  -Discussed GOC with tanner Shaw explained Ms Ryan overall frail condition,albeit improved LV EF her condition and prognosis is poor-considering discharge to Home with Home care.  -On Furosemide PO  .[place] and Entresto  -Tolerating Coreg  -Awaiting discharge to SNF    Problem/Plan - 2:  ·  Problem: Severe mitral valve regurgitation.   ·  Plan: - Structural cardiology consulted for symptomatic severe MR, though in discussions with the team - the anatomic limitations of her mitral valve plus significant frailty would make her a poor candidate for any intervention.    Problem/Plan - 3:  ·  Problem: Chronic atrial fibrillation.   ·  Plan: - continue digoxin and systemic a/c, on therapeutic lovenox per cardiology. digoxin level normal.  -Resumed Eliquis      Problem/Plan - 4:  ·  Problem: Transaminitis.   ·  Plan: - due to congestion and resolved with diuresis.    Problem/Plan - 5:  ·  Problem: JUANY (acute kidney injury).

## 2021-09-13 LAB
ALBUMIN SERPL ELPH-MCNC: 3.3 G/DL — SIGNIFICANT CHANGE UP (ref 3.3–5)
ALP SERPL-CCNC: 108 U/L — SIGNIFICANT CHANGE UP (ref 40–120)
ALT FLD-CCNC: 23 U/L — SIGNIFICANT CHANGE UP (ref 10–45)
ANION GAP SERPL CALC-SCNC: 13 MMOL/L — SIGNIFICANT CHANGE UP (ref 5–17)
AST SERPL-CCNC: 22 U/L — SIGNIFICANT CHANGE UP (ref 10–40)
BASOPHILS # BLD AUTO: 0.01 K/UL — SIGNIFICANT CHANGE UP (ref 0–0.2)
BASOPHILS NFR BLD AUTO: 0.1 % — SIGNIFICANT CHANGE UP (ref 0–2)
BILIRUB SERPL-MCNC: 1 MG/DL — SIGNIFICANT CHANGE UP (ref 0.2–1.2)
BUN SERPL-MCNC: 43 MG/DL — HIGH (ref 7–23)
CALCIUM SERPL-MCNC: 9.3 MG/DL — SIGNIFICANT CHANGE UP (ref 8.4–10.5)
CHLORIDE SERPL-SCNC: 97 MMOL/L — SIGNIFICANT CHANGE UP (ref 96–108)
CO2 SERPL-SCNC: 35 MMOL/L — HIGH (ref 22–31)
CREAT SERPL-MCNC: 0.82 MG/DL — SIGNIFICANT CHANGE UP (ref 0.5–1.3)
EOSINOPHIL # BLD AUTO: 0.02 K/UL — SIGNIFICANT CHANGE UP (ref 0–0.5)
EOSINOPHIL NFR BLD AUTO: 0.2 % — SIGNIFICANT CHANGE UP (ref 0–6)
GLUCOSE BLDC GLUCOMTR-MCNC: 151 MG/DL — HIGH (ref 70–99)
GLUCOSE BLDC GLUCOMTR-MCNC: 155 MG/DL — HIGH (ref 70–99)
GLUCOSE BLDC GLUCOMTR-MCNC: 174 MG/DL — HIGH (ref 70–99)
GLUCOSE BLDC GLUCOMTR-MCNC: 178 MG/DL — HIGH (ref 70–99)
GLUCOSE SERPL-MCNC: 124 MG/DL — HIGH (ref 70–99)
HCT VFR BLD CALC: 33.7 % — LOW (ref 34.5–45)
HGB BLD-MCNC: 9.5 G/DL — LOW (ref 11.5–15.5)
IMM GRANULOCYTES NFR BLD AUTO: 0.5 % — SIGNIFICANT CHANGE UP (ref 0–1.5)
LYMPHOCYTES # BLD AUTO: 1.1 K/UL — SIGNIFICANT CHANGE UP (ref 1–3.3)
LYMPHOCYTES # BLD AUTO: 11.4 % — LOW (ref 13–44)
MAGNESIUM SERPL-MCNC: 2 MG/DL — SIGNIFICANT CHANGE UP (ref 1.6–2.6)
MCHC RBC-ENTMCNC: 22.6 PG — LOW (ref 27–34)
MCHC RBC-ENTMCNC: 28.2 GM/DL — LOW (ref 32–36)
MCV RBC AUTO: 80.2 FL — SIGNIFICANT CHANGE UP (ref 80–100)
MONOCYTES # BLD AUTO: 0.87 K/UL — SIGNIFICANT CHANGE UP (ref 0–0.9)
MONOCYTES NFR BLD AUTO: 9 % — SIGNIFICANT CHANGE UP (ref 2–14)
NEUTROPHILS # BLD AUTO: 7.61 K/UL — HIGH (ref 1.8–7.4)
NEUTROPHILS NFR BLD AUTO: 78.8 % — HIGH (ref 43–77)
NRBC # BLD: 0 /100 WBCS — SIGNIFICANT CHANGE UP (ref 0–0)
PLATELET # BLD AUTO: 214 K/UL — SIGNIFICANT CHANGE UP (ref 150–400)
POTASSIUM SERPL-MCNC: 3.5 MMOL/L — SIGNIFICANT CHANGE UP (ref 3.5–5.3)
POTASSIUM SERPL-SCNC: 3.5 MMOL/L — SIGNIFICANT CHANGE UP (ref 3.5–5.3)
PROT SERPL-MCNC: 5.6 G/DL — LOW (ref 6–8.3)
RBC # BLD: 4.2 M/UL — SIGNIFICANT CHANGE UP (ref 3.8–5.2)
RBC # FLD: 18.4 % — HIGH (ref 10.3–14.5)
SODIUM SERPL-SCNC: 145 MMOL/L — SIGNIFICANT CHANGE UP (ref 135–145)
WBC # BLD: 9.66 K/UL — SIGNIFICANT CHANGE UP (ref 3.8–10.5)
WBC # FLD AUTO: 9.66 K/UL — SIGNIFICANT CHANGE UP (ref 3.8–10.5)

## 2021-09-13 RX ADMIN — Medication 1 TABLET(S): at 11:41

## 2021-09-13 RX ADMIN — SACUBITRIL AND VALSARTAN 1 TABLET(S): 24; 26 TABLET, FILM COATED ORAL at 05:54

## 2021-09-13 RX ADMIN — CHLORHEXIDINE GLUCONATE 1 APPLICATION(S): 213 SOLUTION TOPICAL at 11:24

## 2021-09-13 RX ADMIN — APIXABAN 2.5 MILLIGRAM(S): 2.5 TABLET, FILM COATED ORAL at 05:54

## 2021-09-13 RX ADMIN — SENNA PLUS 2 TABLET(S): 8.6 TABLET ORAL at 23:33

## 2021-09-13 RX ADMIN — SACUBITRIL AND VALSARTAN 1 TABLET(S): 24; 26 TABLET, FILM COATED ORAL at 20:30

## 2021-09-13 RX ADMIN — NYSTATIN CREAM 1 APPLICATION(S): 100000 CREAM TOPICAL at 17:06

## 2021-09-13 RX ADMIN — Medication 40 MILLIGRAM(S): at 05:54

## 2021-09-13 RX ADMIN — Medication 1: at 17:11

## 2021-09-13 RX ADMIN — NYSTATIN CREAM 1 APPLICATION(S): 100000 CREAM TOPICAL at 05:54

## 2021-09-13 RX ADMIN — PANTOPRAZOLE SODIUM 40 MILLIGRAM(S): 20 TABLET, DELAYED RELEASE ORAL at 05:54

## 2021-09-13 RX ADMIN — Medication 1: at 11:41

## 2021-09-13 RX ADMIN — Medication 1: at 08:02

## 2021-09-13 RX ADMIN — SPIRONOLACTONE 25 MILLIGRAM(S): 25 TABLET, FILM COATED ORAL at 05:54

## 2021-09-13 RX ADMIN — Medication 500 MILLIGRAM(S): at 11:40

## 2021-09-13 RX ADMIN — APIXABAN 2.5 MILLIGRAM(S): 2.5 TABLET, FILM COATED ORAL at 17:11

## 2021-09-13 RX ADMIN — Medication 125 MICROGRAM(S): at 05:54

## 2021-09-13 NOTE — PROGRESS NOTE ADULT - SUBJECTIVE AND OBJECTIVE BOX
DATE OF SERVICE:  09/12/2021    INTERIM EVENTS:Awake no complaints       PMH -reviewed admission note, no change since admission  Heart Failure: Acute [ ] Chronic [ ] Acute on Chronic [x ] Diastolic [ x] Systolic [x ] Combined Systolic and Diastolic[ ]  JUANY[ ]  ATN[ ]  CKD I [ ] CKDII [ ] CKD III [ ] CKD IV [ ] CKD V [ ] ESRD[ ]  HTN[x ] CVA[ ] DM[ ] COPD[ ] COVID[ ] AF[ ]  PPM[ ] ICD[x ]    MEDICATIONS  (STANDING):  apixaban 2.5 milliGRAM(s) Oral two times a day  ascorbic acid 500 milliGRAM(s) Oral daily  carvedilol 3.125 milliGRAM(s) Oral every 12 hours  chlorhexidine 4% Liquid 1 Application(s) Topical daily  digoxin     Tablet 125 MICROGram(s) Oral daily  furosemide    Tablet 40 milliGRAM(s) Oral daily  glucagon  Injectable 1 milliGRAM(s) IntraMuscular once  insulin lispro (ADMELOG) corrective regimen sliding scale   SubCutaneous three times a day before meals  insulin lispro (ADMELOG) corrective regimen sliding scale   SubCutaneous at bedtime  multivitamin 1 Tablet(s) Oral daily  nystatin Powder 1 Application(s) Topical two times a day  pantoprazole    Tablet 40 milliGRAM(s) Oral before breakfast  polyethylene glycol 3350 17 Gram(s) Oral daily  sacubitril 24 mG/valsartan 26 mG 1 Tablet(s) Oral two times a day  senna 2 Tablet(s) Oral at bedtime  spironolactone 25 milliGRAM(s) Oral daily    MEDICATIONS  (PRN):  acetaminophen   Tablet .. 650 milliGRAM(s) Oral every 6 hours PRN Temp greater or equal to 38C (100.4F), Mild Pain (1 - 3)  benzocaine 15 mG/menthol 3.6 mG (Sugar-Free) Lozenge 1 Lozenge Oral every 2 hours PRN Sore Throat  haloperidol    Injectable 0.25 milliGRAM(s) IV Push every 8 hours PRN Agitation  hemorrhoidal Ointment 1 Application(s) Rectal four times a day PRN rectal irritation            Lungs: dec breath sounds at bases  Heart: Regular rate and rhythm;  Abdomen: Nontender, bowel sounds present  Extremities: edema+  Nervous system:  Alert awake  Psychiatric: Normal affect  Skin: No rashes or lesions    LABS:  09-12    144  |  96  |  45<H>  ----------------------------<  112<H>  3.8   |  36<H>  |  0.81    Ca    8.8      12 Sep 2021 06:03  Phos  3.0     09-12  Mg     2.0     09-12      Creatinine Trend: 0.81<--, 0.82<--, 1.11<--, 0.89<--, 0.91<--, 0.94<--            IMPRESSION AND PLAN:  85 YO F with with a history of ACC/AHA Stage C NICM with improved LVEF (LV 4.9 cm, LVEF 40-45%) s/p CRT-D, severe mitral regurgitation being considered for MitraClip at [facility], chronic AF/AFl, and DM2 who was admitted to [facility] with ADHF and transferred to [facility] for consideration of MitraClip    Problem/Plan - 1:  ·  Problem: Acute decompensated heart failure.   ·  Plan: - GDMT: continue losartan 25 mg daily and spironolactone 25 mg daily. Will start BB when euvolemic and uptitrate losartan as tolerates   - Device: s/p CRT with 92% BiV pacing  -Discussed GOC with tanner Shaw explained Ms Ryan overall frail condition,albeit improved LV EF her condition and prognosis is poor-considering discharge to Home with Home care.  -On Furosemide PO  .[place] and Entresto  -Tolerating Coreg  -Awaiting discharge to SNF    Problem/Plan - 2:  ·  Problem: Severe mitral valve regurgitation.   ·  Plan: - Structural cardiology consulted for symptomatic severe MR, though in discussions with the team - the anatomic limitations of her mitral valve plus significant frailty would make her a poor candidate for any intervention.    Problem/Plan - 3:  ·  Problem: Chronic atrial fibrillation.   ·  Plan: - continue digoxin and systemic a/c, on therapeutic lovenox per cardiology. digoxin level normal.  -Resumed Eliquis      Problem/Plan - 4:  ·  Problem: Transaminitis.   ·  Plan: - due to congestion and resolved with diuresis.    Problem/Plan - 5:  ·  Problem: JUANY (acute kidney injury).

## 2021-09-13 NOTE — CHART NOTE - NSCHARTNOTEFT_GEN_A_CORE
Pt seen and assessed  RDSO Score 0  PAINAD 0  Will speak with the HCP to determine code status tomorrow

## 2021-09-14 LAB
AMMONIA BLD-MCNC: 20 UMOL/L — SIGNIFICANT CHANGE UP (ref 11–55)
APPEARANCE UR: ABNORMAL
BACTERIA # UR AUTO: ABNORMAL
BILIRUB UR-MCNC: NEGATIVE — SIGNIFICANT CHANGE UP
COLOR SPEC: YELLOW — SIGNIFICANT CHANGE UP
DIFF PNL FLD: ABNORMAL
EPI CELLS # UR: 3 /HPF — SIGNIFICANT CHANGE UP
GLUCOSE BLDC GLUCOMTR-MCNC: 142 MG/DL — HIGH (ref 70–99)
GLUCOSE BLDC GLUCOMTR-MCNC: 146 MG/DL — HIGH (ref 70–99)
GLUCOSE BLDC GLUCOMTR-MCNC: 168 MG/DL — HIGH (ref 70–99)
GLUCOSE BLDC GLUCOMTR-MCNC: 168 MG/DL — HIGH (ref 70–99)
GLUCOSE UR QL: NEGATIVE — SIGNIFICANT CHANGE UP
HCT VFR BLD CALC: 32.9 % — LOW (ref 34.5–45)
HGB BLD-MCNC: 9.2 G/DL — LOW (ref 11.5–15.5)
HYALINE CASTS # UR AUTO: 1 /LPF — SIGNIFICANT CHANGE UP (ref 0–2)
KETONES UR-MCNC: NEGATIVE — SIGNIFICANT CHANGE UP
LACTATE SERPL-SCNC: 1.3 MMOL/L — SIGNIFICANT CHANGE UP (ref 0.7–2)
LEUKOCYTE ESTERASE UR-ACNC: ABNORMAL
MCHC RBC-ENTMCNC: 22.2 PG — LOW (ref 27–34)
MCHC RBC-ENTMCNC: 28 GM/DL — LOW (ref 32–36)
MCV RBC AUTO: 79.3 FL — LOW (ref 80–100)
NITRITE UR-MCNC: NEGATIVE — SIGNIFICANT CHANGE UP
NRBC # BLD: 0 /100 WBCS — SIGNIFICANT CHANGE UP (ref 0–0)
PH UR: 6 — SIGNIFICANT CHANGE UP (ref 5–8)
PLATELET # BLD AUTO: 211 K/UL — SIGNIFICANT CHANGE UP (ref 150–400)
PROT UR-MCNC: ABNORMAL
RBC # BLD: 4.15 M/UL — SIGNIFICANT CHANGE UP (ref 3.8–5.2)
RBC # FLD: 18.2 % — HIGH (ref 10.3–14.5)
RBC CASTS # UR COMP ASSIST: 6 /HPF — HIGH (ref 0–4)
SP GR SPEC: 1.01 — SIGNIFICANT CHANGE UP (ref 1.01–1.02)
UROBILINOGEN FLD QL: ABNORMAL
WBC # BLD: 11.52 K/UL — HIGH (ref 3.8–10.5)
WBC # FLD AUTO: 11.52 K/UL — HIGH (ref 3.8–10.5)
WBC UR QL: 779 /HPF — HIGH (ref 0–5)

## 2021-09-14 RX ADMIN — Medication 1 TABLET(S): at 12:06

## 2021-09-14 RX ADMIN — SPIRONOLACTONE 25 MILLIGRAM(S): 25 TABLET, FILM COATED ORAL at 06:16

## 2021-09-14 RX ADMIN — Medication 40 MILLIGRAM(S): at 06:16

## 2021-09-14 RX ADMIN — Medication 125 MICROGRAM(S): at 06:16

## 2021-09-14 RX ADMIN — SACUBITRIL AND VALSARTAN 1 TABLET(S): 24; 26 TABLET, FILM COATED ORAL at 06:16

## 2021-09-14 RX ADMIN — SENNA PLUS 2 TABLET(S): 8.6 TABLET ORAL at 21:37

## 2021-09-14 RX ADMIN — CARVEDILOL PHOSPHATE 3.12 MILLIGRAM(S): 80 CAPSULE, EXTENDED RELEASE ORAL at 18:05

## 2021-09-14 RX ADMIN — POLYETHYLENE GLYCOL 3350 17 GRAM(S): 17 POWDER, FOR SOLUTION ORAL at 12:08

## 2021-09-14 RX ADMIN — APIXABAN 2.5 MILLIGRAM(S): 2.5 TABLET, FILM COATED ORAL at 06:17

## 2021-09-14 RX ADMIN — APIXABAN 2.5 MILLIGRAM(S): 2.5 TABLET, FILM COATED ORAL at 18:05

## 2021-09-14 RX ADMIN — SACUBITRIL AND VALSARTAN 1 TABLET(S): 24; 26 TABLET, FILM COATED ORAL at 18:05

## 2021-09-14 RX ADMIN — CHLORHEXIDINE GLUCONATE 1 APPLICATION(S): 213 SOLUTION TOPICAL at 12:06

## 2021-09-14 RX ADMIN — Medication 500 MILLIGRAM(S): at 12:07

## 2021-09-14 RX ADMIN — Medication 1: at 12:07

## 2021-09-14 RX ADMIN — NYSTATIN CREAM 1 APPLICATION(S): 100000 CREAM TOPICAL at 06:30

## 2021-09-14 RX ADMIN — PANTOPRAZOLE SODIUM 40 MILLIGRAM(S): 20 TABLET, DELAYED RELEASE ORAL at 06:16

## 2021-09-14 RX ADMIN — CARVEDILOL PHOSPHATE 3.12 MILLIGRAM(S): 80 CAPSULE, EXTENDED RELEASE ORAL at 06:16

## 2021-09-14 RX ADMIN — NYSTATIN CREAM 1 APPLICATION(S): 100000 CREAM TOPICAL at 18:05

## 2021-09-14 NOTE — PROGRESS NOTE ADULT - SUBJECTIVE AND OBJECTIVE BOX
DATE OF SERVICE:  09/14/2021  Patient was seen and examined on   09/14/2021  .Interim events noted.Consultant notes ,Labs,Telemetry reviewed by me    PRESENTING CC:Dyspnea    HPI and HOSPITAL COURSE: 85F [place]ian-speaking w/ hx of severe MV regurgitation, HFrEF s/p AICD, chronic afib, DM2, HTN, hyperthyroidism presented initially to [facility] with acute heart failure now transferred to [facility] for further management. The patient was admitted initially to [facility] 8/26 with symptoms of sob and lower extremity swelling in setting of delaying mitral clip at [facility] reportedly planned 1mo prior. She required dobutamine for IV furosemide diuresis, completed TTE demonstrating severe MR, TR, pulmHTN, and grossly low normal left ventricular systolic function. She additionally was identified to have dysphagia and completed speech and swallow which identify need for dysphagia diet. Wound care RN additionally identified and were treating decubitus wounds which were present prior to hospitalization. Was treated with Dobutrex assisted diuresis   Is off IV diuretics     INTERIM EVENTS:Awake verbalizes A&O x2 is at her baseline mental status not dyspneac       [facility] -reviewed admission note, no change since admission  Heart Failure: Acute [ ] Chronic [ ] Acute on Chronic [x ] Diastolic [ ] Systolic [x ] Combined Systolic and Diastolic[ ]  JUANY[ ]  ATN[ ]  CKD I [ ] CKDII [ ] CKD III [ ] CKD IV [ ] CKD V [ ] ESRD[ ]  HTN[x ] CVA[ ] DM[ ] COPD[ ] COVID[ ] AF[x ]  PPM[ ] ICD[x ]    MEDICATIONS  (STANDING):  apixaban 2.5 milliGRAM(s) Oral two times a day  ascorbic acid 500 milliGRAM(s) Oral daily  carvedilol 3.125 milliGRAM(s) Oral every 12 hours  chlorhexidine 4% Liquid 1 Application(s) Topical daily  dextrose 40% Gel 15 Gram(s) Oral once  digoxin     Tablet 125 MICROGram(s) Oral daily  furosemide    Tablet 40 milliGRAM(s) Oral daily  glucagon  Injectable 1 milliGRAM(s) IntraMuscular once  insulin lispro (ADMELOG) corrective regimen sliding scale   SubCutaneous three times a day before meals  insulin lispro (ADMELOG) corrective regimen sliding scale   SubCutaneous at bedtime  multivitamin 1 Tablet(s) Oral daily  nystatin Powder 1 Application(s) Topical two times a day  pantoprazole    Tablet 40 milliGRAM(s) Oral before breakfast  polyethylene glycol 3350 17 Gram(s) Oral daily  sacubitril 24 mG/valsartan 26 mG 1 Tablet(s) Oral two times a day  senna 2 Tablet(s) Oral at bedtime  spironolactone 25 milliGRAM(s) Oral daily    MEDICATIONS  (PRN):  acetaminophen   Tablet .. 650 milliGRAM(s) Oral every 6 hours PRN Temp greater or equal to 38C (100.4F), Mild Pain (1 - 3)  benzocaine 15 mG/menthol 3.6 mG (Sugar-Free) Lozenge 1 Lozenge Oral every 2 hours PRN Sore Throat  haloperidol    Injectable 0.25 milliGRAM(s) IV Push every 8 hours PRN Agitation  hemorrhoidal Ointment 1 Application(s) Rectal four times a day PRN rectal irritation            REVIEW OF SYSTEMS:  Constitutional: [ ] fever, [ ]weight loss,  [x ]fatigue  Eyes: [ ] visual changes  Respiratory: [ ]shortness of breath;  [ ] cough, [ ]wheezing, [ ]chills, [ ]hemoptysis  Cardiovascular: [ ] chest pain, [ ]palpitations, [ ]dizziness,  [ ]leg swelling[ ]orthopnea[ ]PND  Gastrointestinal: [ ] abdominal pain, [ ]nausea, [ ]vomiting,  [ ]diarrhea [ ]Constipation [ ]Melena  Genitourinary: [ ] dysuria, [ ] hematuria [ ]Esquivel  Neurologic: [ ] headaches [ ] tremors[ ]weakness [ ]Paralysis Right[ ] Left[ ]  Skin: [ ] itching, [ ]burning, [ ] rashes  Endocrine: [ ] heat or cold intolerance  Musculoskeletal: [ ] joint pain or swelling; [ ] muscle, back, or extremity pain  Psychiatric: [ ] depression, [ ]anxiety, [ ]mood swings, or [ ]difficulty sleeping  Hematologic: [ ] easy bruising, [ ] bleeding gums    [x] All remaining systems negative except as per above.   [ ]Unable to obtain.    Vital Signs Last 24 Hrs  T(C): 36.4 (14 Sep 2021 04:27), Max: 36.8 (13 Sep 2021 12:01)  T(F): 97.5 (14 Sep 2021 04:27), Max: 98.2 (13 Sep 2021 12:01)  HR: 86 (14 Sep 2021 04:27) (68 - 86)  BP: 128/73 (14 Sep 2021 04:27) (97/63 - 128/73)  RR: 17 (14 Sep 2021 04:27) (17 - 18)  SpO2: 94% (14 Sep 2021 04:27) (93% - 95%)  I&O's Summary    13 Sep 2021 07:01  -  14 Sep 2021 07:00  --------------------------------------------------------  IN: 680 mL / OUT: 700 mL / NET: -20 mL        PHYSICAL EXAM:  General: No acute distress BMI-  HEENT: EOMI, PERRL  Neck: Supple, [ ] JVD  Lungs: Equal air entry bilaterally; [ ] rales [ ] wheezing [ ] rhonchi  Heart: Regular rate and rhythm; [ ] murmur   /6 [ ] systolic [ ] diastolic [ ] radiation[ ] rubs [ ]  gallops  Abdomen: Nontender, bowel sounds present  Extremities: No clubbing, cyanosis, [ ] edema [ ]Pulses  equal and intact  Nervous system:  Alert & Oriented X3, no focal deficits  Psychiatric: Normal affect  Skin: No rashes or lesions    LABS:  09-13    145  |  97  |  43<H>  ----------------------------<  124<H>  3.5   |  35<H>  |  0.82    Ca    9.3      13 Sep 2021 08:04  Mg     2.0     09-13    TPro  5.6<L>  /  Alb  3.3  /  TBili  1.0  /  DBili  x   /  AST  22  /  ALT  23  /  AlkPhos  108  09-13    Creatinine Trend: 0.82<--, 0.81<--, 0.82<--, 1.11<--, 0.89<--, 0.91<--                        9.2    11.52 )-----------( 211      ( 14 Sep 2021 02:27 )             32.9         Cardiac Enzymes:           RADIOLOGY:    ECG [my interpretation]:    TELEMETRY:Reviewed monitor tracings-    ECHO:    STRESS TEST:    CATHETERIZATION:      IMPRESSION AND PLAN:

## 2021-09-14 NOTE — PROGRESS NOTE ADULT - SUBJECTIVE AND OBJECTIVE BOX
DATE OF SERVICE:  2021    pt seen and examined    MEDICATIONS  (STANDING):  apixaban 2.5 milliGRAM(s) Oral two times a day  ascorbic acid 500 milliGRAM(s) Oral daily  carvedilol 3.125 milliGRAM(s) Oral every 12 hours  chlorhexidine 4% Liquid 1 Application(s) Topical daily  dextrose 40% Gel 15 Gram(s) Oral once  dextrose 5%. 1000 milliLiter(s) (50 mL/Hr) IV Continuous <Continuous>  dextrose 5%. 1000 milliLiter(s) (100 mL/Hr) IV Continuous <Continuous>  dextrose 50% Injectable 25 Gram(s) IV Push once  dextrose 50% Injectable 12.5 Gram(s) IV Push once  dextrose 50% Injectable 25 Gram(s) IV Push once  digoxin     Tablet 125 MICROGram(s) Oral daily  furosemide    Tablet 40 milliGRAM(s) Oral daily  glucagon  Injectable 1 milliGRAM(s) IntraMuscular once  insulin lispro (ADMELOG) corrective regimen sliding scale   SubCutaneous three times a day before meals  insulin lispro (ADMELOG) corrective regimen sliding scale   SubCutaneous at bedtime  multivitamin 1 Tablet(s) Oral daily  nystatin Powder 1 Application(s) Topical two times a day  pantoprazole    Tablet 40 milliGRAM(s) Oral before breakfast  polyethylene glycol 3350 17 Gram(s) Oral daily  sacubitril 24 mG/valsartan 26 mG 1 Tablet(s) Oral two times a day  senna 2 Tablet(s) Oral at bedtime  spironolactone 25 milliGRAM(s) Oral daily    MEDICATIONS  (PRN):  acetaminophen   Tablet .. 650 milliGRAM(s) Oral every 6 hours PRN Temp greater or equal to 38C (100.4F), Mild Pain (1 - 3)  benzocaine 15 mG/menthol 3.6 mG (Sugar-Free) Lozenge 1 Lozenge Oral every 2 hours PRN Sore Throat  haloperidol    Injectable 0.25 milliGRAM(s) IV Push every 8 hours PRN Agitation  hemorrhoidal Ointment 1 Application(s) Rectal four times a day PRN rectal irritation    Vital Signs Last 24 Hrs  T(C): 36.6 (14 Sep 2021 19:59), Max: 36.8 (14 Sep 2021 11:37)  T(F): 97.9 (14 Sep 2021 19:59), Max: 98.3 (14 Sep 2021 11:37)  HR: 92 (14 Sep 2021 19:59) (66 - 92)  BP: 109/75 (14 Sep 2021 19:59) (103/67 - 128/73)  BP(mean): --  RR: 17 (14 Sep 2021 19:59) (17 - 18)  SpO2: 93% (14 Sep 2021 19:59) (93% - 94%)      Lungs: dec breath sounds at bases  Heart: Regular rate and rhythm;  Abdomen: soft , bs+  Extremities: edema+  Nervous system:  Alert awake  Psychiatric: Normal affect  Skin: No rashes or lesions    LABS:      145  |  97  |  43<H>  ----------------------------<  124<H>  3.5   |  35<H>  |  0.82    Ca    9.3      13 Sep 2021 08:04  Mg     2.0         TPro  5.6<L>  /  Alb  3.3  /  TBili  1.0  /  DBili      /  AST  22  /  ALT  23  /  AlkPhos  108      Creatinine Trend: 0.82 <--, 0.81 <--, 0.82 <--, 1.11 <--, 0.89 <--, 0.91 <--                        9.2    11.52 )-----------( 211      ( 14 Sep 2021 02:27 )             32.9     Urine Studies:  Urinalysis Basic - ( 14 Sep 2021 03:59 )    Color: Yellow / Appearance: Turbid / S.015 / pH:   Gluc:  / Ketone: Negative  / Bili: Negative / Urobili: 2 mg/dL   Blood:  / Protein: 30 mg/dL / Nitrite: Negative   Leuk Esterase: Large / RBC: 6 /hpf /  /HPF   Sq Epi:  / Non Sq Epi: 3 /hpf / Bacteria: Many              LIVER FUNCTIONS - ( 13 Sep 2021 08:04 )  Alb: 3.3 g/dL / Pro: 5.6 g/dL / ALK PHOS: 108 U/L / ALT: 23 U/L / AST: 22 U/L / GGT: x                 IMPRESSION AND PLAN:  85 YO F with with a history of ACC/AHA Stage C NICM with improved LVEF (LV 4.9 cm, LVEF 40-45%) s/p CRT-D, severe mitral regurgitation being considered for MitraClip at [facility], chronic AF/AFl, and DM2 who was admitted to [facility] with ADHF and transferred to [facility] for consideration of MitraClip    Problem/Plan - 1:  ·  Problem: Acute decompensated heart failure.   ·  Plan: - GDMT: continue losartan 25 mg daily and spironolactone 25 mg daily. Will start BB when euvolemic and uptitrate losartan as tolerates   - Device: s/p CRT with 92% BiV pacing  -Discussed GOC with tanner Shaw explained Ms Ryan overall frail condition,albeit improved LV EF her condition and prognosis is poor-considering discharge to Home with Home care.  -On Furosemide PO  .[place] and Entresto  -Tolerating Coreg  -Awaiting discharge to SNF    Problem/Plan - 2:  ·  Problem: Severe mitral valve regurgitation.   ·  Plan: - Structural cardiology consulted for symptomatic severe MR, though in discussions with the team - the anatomic limitations of her mitral valve plus significant frailty would make her a poor candidate for any intervention.    Problem/Plan - 3:  ·  Problem: Chronic atrial fibrillation.   ·  Plan: - continue digoxin and systemic a/c, on therapeutic lovenox per cardiology. digoxin level normal.  -Resumed Eliquis      Problem/Plan - 4:  ·  Problem: Transaminitis.   ·  Plan: - due to congestion and resolved with diuresis.    Problem/Plan - 5:  ·  Problem: JUANY (acute kidney injury).

## 2021-09-14 NOTE — CHART NOTE - NSCHARTNOTEFT_GEN_A_CORE
Called by RN, pt noted more cognitively impaired by pt's tanner. Patient more declined is conversation and noted dazed. Pt w/ history of dementia and confusion at baseline but noted to be more quiet. Pt seen at bedside, able to answer questions, denies any pain, CP, SOB, numbness or tingling, neuro deficits, dysuria, urinary frequency.   Pt is a 86y old  Female transferred from [facility] to [facility] for acute heart failure w/ severe Mitral Regurgitation (13 Sep 2021 10:31) Pt s/p I.V. diuresis now continued on p.o. Lasix daily, pt also not a candidate for mitral clip for d/t frailty. Palliative has been following, as per their recent note, informs to review code status. Pt noted by tanner and RN who has had pt before to be cognitively more impaired. As per previous notes, patient is A&Ox1 at time of admit to [facility] and baseline mental status also suggested dementia reporting A&Ox2 baseline at [facility].    Vital Signs Last 24 Hrs  T(C): 36.4 (14 Sep 2021 04:27), Max: 36.8 (13 Sep 2021 12:01)  T(F): 97.5 (14 Sep 2021 04:27), Max: 98.2 (13 Sep 2021 12:01)  HR: 86 (14 Sep 2021 04:27) (68 - 86)  BP: 128/73 (14 Sep 2021 04:27) (97/63 - 128/73)  BP(mean): --  RR: 17 (14 Sep 2021 04:27) (17 - 18)  SpO2: 94% (14 Sep 2021 04:27) (93% - 95%)                        9.2    11.52 )-----------( 211      ( 14 Sep 2021 02:27 )             32.9     09-13    145  |  97  |  43<H>  ----------------------------<  124<H>  3.5   |  35<H>  |  0.82    Ca    9.3      13 Sep 2021 08:04  Phos  3.0     09-12  Mg     2.0     09-13    TPro  5.6<L>  /  Alb  3.3  /  TBili  1.0  /  DBili  x   /  AST  22  /  ALT  23  /  AlkPhos  108  09-13    Urinalysis (09.14.21 @ 03:59)   Glucose Qualitative, Urine: Negative   Blood, Urine: Small   pH Urine: 6.0   Color: Yellow   Urine Appearance: Turbid   Bilirubin: Negative   Ketone - Urine: Negative   Specific Gravity: 1.015   Protein, Urine: 30 mg/dL   Urobilinogen: 2 mg/dL   Nitrite: Negative   Leukocyte Esterase Concentration: Large Ammonia, Serum (09.14.21 @ 02:27)     Ammonia, Serum: 20 umol/L Lactate, Blood (09.14.21 @ 02:27)   Lactate, Blood: 1.3 mmol/L     General: WN/WD NAD  Neurology: A&Ox1 nonfocal, STOCK x 4  Head:  Normocephalic, atraumatic  Respiratory: CTA B/L  CV: RRR, S1S2, no murmur  Abdominal: Soft, NT, ND no palpable mass  MSK: No edema, + peripheral pulses, FROM all 4 extremity    A/P: 86y old  Female transferred from [facility] to [facility] for acute heart failure w/ severe Mitral Regurgitation (13 Sep 2021 10:31) Pt s/p I.V. diuresis now continued on p.o. Lasix daily, pt also not a candidate for mitral clip due to frailty. Pt noted by tanner and RN who has had pt before to be cognitively more impaired. As per previous notes, patient was A&Ox1 at time of admission to [facility] and baseline mental status also suggested dementia reporting A&Ox2 baseline at [facility]. Palliative also following pt, discusses code status to be reviewed.  - Will rule out infectious cause UA sent looks +, Urine Cx sent, CBC with noted increase in WBC, but remains afebrile, Lactate WNL  -On admission pt noted with transaminitis- ? Congestive Hepatopathy - ammonia level sent returned WNL  - r/o Neuro deficit - pt answered questions appropriately, able to follow commands and move all extremities, not noted neuro deficits.   will hold off [facility].  - Discussed with RN  - Will endorse concerns to day team during sign out for further eval.    Payton Blackwood, HEATH  x [phone]

## 2021-09-15 LAB
ANION GAP SERPL CALC-SCNC: 11 MMOL/L — SIGNIFICANT CHANGE UP (ref 5–17)
BUN SERPL-MCNC: 48 MG/DL — HIGH (ref 7–23)
CALCIUM SERPL-MCNC: 9.3 MG/DL — SIGNIFICANT CHANGE UP (ref 8.4–10.5)
CHLORIDE SERPL-SCNC: 98 MMOL/L — SIGNIFICANT CHANGE UP (ref 96–108)
CO2 SERPL-SCNC: 35 MMOL/L — HIGH (ref 22–31)
CREAT SERPL-MCNC: 0.95 MG/DL — SIGNIFICANT CHANGE UP (ref 0.5–1.3)
GLUCOSE BLDC GLUCOMTR-MCNC: 135 MG/DL — HIGH (ref 70–99)
GLUCOSE BLDC GLUCOMTR-MCNC: 165 MG/DL — HIGH (ref 70–99)
GLUCOSE BLDC GLUCOMTR-MCNC: 169 MG/DL — HIGH (ref 70–99)
GLUCOSE BLDC GLUCOMTR-MCNC: 185 MG/DL — HIGH (ref 70–99)
GLUCOSE SERPL-MCNC: 128 MG/DL — HIGH (ref 70–99)
HCT VFR BLD CALC: 32.7 % — LOW (ref 34.5–45)
HGB BLD-MCNC: 9.3 G/DL — LOW (ref 11.5–15.5)
MCHC RBC-ENTMCNC: 22.7 PG — LOW (ref 27–34)
MCHC RBC-ENTMCNC: 28.4 GM/DL — LOW (ref 32–36)
MCV RBC AUTO: 80 FL — SIGNIFICANT CHANGE UP (ref 80–100)
NRBC # BLD: 0 /100 WBCS — SIGNIFICANT CHANGE UP (ref 0–0)
PLATELET # BLD AUTO: 204 K/UL — SIGNIFICANT CHANGE UP (ref 150–400)
POTASSIUM SERPL-MCNC: 4 MMOL/L — SIGNIFICANT CHANGE UP (ref 3.5–5.3)
POTASSIUM SERPL-SCNC: 4 MMOL/L — SIGNIFICANT CHANGE UP (ref 3.5–5.3)
RBC # BLD: 4.09 M/UL — SIGNIFICANT CHANGE UP (ref 3.8–5.2)
RBC # FLD: 18.3 % — HIGH (ref 10.3–14.5)
SODIUM SERPL-SCNC: 144 MMOL/L — SIGNIFICANT CHANGE UP (ref 135–145)
WBC # BLD: 10.5 K/UL — SIGNIFICANT CHANGE UP (ref 3.8–10.5)
WBC # FLD AUTO: 10.5 K/UL — SIGNIFICANT CHANGE UP (ref 3.8–10.5)

## 2021-09-15 PROCEDURE — 99233 SBSQ HOSP IP/OBS HIGH 50: CPT

## 2021-09-15 RX ORDER — IPRATROPIUM/ALBUTEROL SULFATE 18-103MCG
3 AEROSOL WITH ADAPTER (GRAM) INHALATION ONCE
Refills: 0 | Status: COMPLETED | OUTPATIENT
Start: 2021-09-15 | End: 2021-09-15

## 2021-09-15 RX ORDER — CEFTRIAXONE 500 MG/1
1000 INJECTION, POWDER, FOR SOLUTION INTRAMUSCULAR; INTRAVENOUS EVERY 24 HOURS
Refills: 0 | Status: DISCONTINUED | OUTPATIENT
Start: 2021-09-15 | End: 2021-09-17

## 2021-09-15 RX ADMIN — SPIRONOLACTONE 25 MILLIGRAM(S): 25 TABLET, FILM COATED ORAL at 09:25

## 2021-09-15 RX ADMIN — NYSTATIN CREAM 1 APPLICATION(S): 100000 CREAM TOPICAL at 06:51

## 2021-09-15 RX ADMIN — Medication 125 MICROGRAM(S): at 09:20

## 2021-09-15 RX ADMIN — POLYETHYLENE GLYCOL 3350 17 GRAM(S): 17 POWDER, FOR SOLUTION ORAL at 11:55

## 2021-09-15 RX ADMIN — Medication 40 MILLIGRAM(S): at 06:42

## 2021-09-15 RX ADMIN — PANTOPRAZOLE SODIUM 40 MILLIGRAM(S): 20 TABLET, DELAYED RELEASE ORAL at 06:41

## 2021-09-15 RX ADMIN — APIXABAN 2.5 MILLIGRAM(S): 2.5 TABLET, FILM COATED ORAL at 16:36

## 2021-09-15 RX ADMIN — CEFTRIAXONE 100 MILLIGRAM(S): 500 INJECTION, POWDER, FOR SOLUTION INTRAMUSCULAR; INTRAVENOUS at 19:49

## 2021-09-15 RX ADMIN — SACUBITRIL AND VALSARTAN 1 TABLET(S): 24; 26 TABLET, FILM COATED ORAL at 12:54

## 2021-09-15 RX ADMIN — NYSTATIN CREAM 1 APPLICATION(S): 100000 CREAM TOPICAL at 16:33

## 2021-09-15 RX ADMIN — Medication 1: at 16:33

## 2021-09-15 RX ADMIN — Medication 1: at 11:53

## 2021-09-15 RX ADMIN — APIXABAN 2.5 MILLIGRAM(S): 2.5 TABLET, FILM COATED ORAL at 06:42

## 2021-09-15 RX ADMIN — SACUBITRIL AND VALSARTAN 1 TABLET(S): 24; 26 TABLET, FILM COATED ORAL at 22:21

## 2021-09-15 RX ADMIN — SENNA PLUS 2 TABLET(S): 8.6 TABLET ORAL at 22:18

## 2021-09-15 RX ADMIN — Medication 500 MILLIGRAM(S): at 11:55

## 2021-09-15 RX ADMIN — Medication 3 MILLILITER(S): at 06:48

## 2021-09-15 RX ADMIN — CARVEDILOL PHOSPHATE 3.12 MILLIGRAM(S): 80 CAPSULE, EXTENDED RELEASE ORAL at 06:42

## 2021-09-15 RX ADMIN — CHLORHEXIDINE GLUCONATE 1 APPLICATION(S): 213 SOLUTION TOPICAL at 11:44

## 2021-09-15 RX ADMIN — Medication 1 TABLET(S): at 11:55

## 2021-09-15 NOTE — PROVIDER CONTACT NOTE (OTHER) - SITUATION
6 beats of WCT on tele
As per tele, patient had possible PMT vs AIVR, RN and tele tech unsure of accurate rhythm at the time.
Pt withy 26 beats WCT
pt complains of pain at chairez site.
Patient C/O mild SOB
20 beats of WCT on tele, hx of 28 beats
Patient lethargic with minimal blank stare
pt had 28beats f WCT on tele

## 2021-09-15 NOTE — PROVIDER CONTACT NOTE (OTHER) - ASSESSMENT
vitals noted denies any chest pain, dizziness at htistime
vss on RA. pt aox2, at baseline, staff RN translating. no other complaints of pain or discomfort.
vital noted , pt AOx 2-3, denies any chest pain or palpitaion at this time
Patient A&Ox4, VSS, was resting comfortably during tele event, denies chest pain, SOB, or headache.
Pt A&Ox3, Uzbek speaking, VSS, denies CP/SOB/dizziness
vital noted , pt AOx 2, denies any chest pain or palpitaion at this time
Patient A&O 1-2. Patient able to make needs known. Denies pain, discomfort, CP, SOB. VSS.
Patient A&O2. Patient able to make needs known. VSS. Patient SpO2 94 - 96% on RA. /71 HR: 87.

## 2021-09-15 NOTE — PROGRESS NOTE ADULT - PROBLEM SELECTOR PLAN 5
- improving with diuresis  - would check bladder US to ensure chairez isn't obstructed since there was not much UOP documented overnight
- resolved with diuresis
Actions:  [x] Rapport building     [x] Symptom assessment    [x] Eliciting preferences of goals   [] Prognostic understanding    [] Emotional Support  [] Coping skill development  []  Other  Interdisciplinary Referrals: None at this time  Communication: d/w niece  No children  No spouse  Pt has aides  Niece is hoping to bring the patient home and reinstate aides, minimal family support due to age  Documentation Review: [x] Primary Team [] Consultants [] Interdisciplinary team  Total time spent including the following  []chart review  []care coordination  []discussion with the primary team  [x]discussion with the patient, surrogate decision maker, or family  Time spent: > 20 min
- resolved with diuresis
- resolved with diuresis
Link to GOC note  Pt defers to HCP Valeria  Valeria would like the patient to be full code and be intubated.  She is unclear about tracheostomy at this time
Actions:  [ ] Rapport building     [x] Symptom assessment    [x] Eliciting preferences of goals   [] Prognostic understanding    [] Emotional Support  [] Coping skill development  []  Other  Interdisciplinary Referrals: None at this time    ACP Time > 16 minutes
- resolved with diuresis

## 2021-09-15 NOTE — PROVIDER CONTACT NOTE (OTHER) - ACTION/TREATMENT ORDERED:
Provider notified, EKG obtained, stat BMP and mg collected, will continue to monitor.
HEATH Cain made aware. Pt with 28 beats earlier today. Electrolytes stable, repeat labs this AM. Pt safety maintained, will continue to monitor.
HEATH Cain notified and evaluated the patient at the bedside. Duoneb inhaler. Furosemide 40 mg PO and Coreg 3.125 PO now. Reschedule rest of cardiac meds to 8:00 AM. Handoff report given to day nurse.
HEATH Cain notified and evaluated patient at bedside. STAT Lactate, ammonia, CBC, UA to be sent as ordered. Will continue to monitor.
NP aware , no new orders at this time , Will conitnue to monitor
STAT EKG ordered , and is done , no new orders received'
awaiting BMP and Mg result , no new orders at this time , Will conitnue to monitor
d/angela rios.

## 2021-09-15 NOTE — PROGRESS NOTE ADULT - PROBLEM SELECTOR PROBLEM 1
Acute decompensated heart failure
Acute decompensated heart failure
Dyspnea
Dyspnea
Acute decompensated heart failure
Severe mitral valve regurgitation
Acute decompensated heart failure
Dyspnea
Acute decompensated heart failure

## 2021-09-15 NOTE — PROGRESS NOTE ADULT - PROBLEM SELECTOR PROBLEM 2
Severe mitral valve regurgitation
Tricuspid regurgitation

## 2021-09-15 NOTE — PROGRESS NOTE ADULT - PROBLEM SELECTOR PLAN 3
Current functional PPSv2: 50  Nursing care required: Needs assistance with ADLs  Code status documented: Full code  A review of the paper chart has been conducted  HCP form present:               Yes and valid [x]               Yes but invalid []                No [ ]   MOLST present:                   Yes and valid []               Yes but invalid []                No [x]  Incapacity form present:   Yes and valid []                Yes but invalid []                No []                 N/A [x]  Living Will:                            Yes and valid []                Yes but invalid []                No [x]        Family Health Care Decision Act (FHCDA) Surrogate Decision Maker Hierarchy in the absence of a health care agent  [facility] Article 81 Guardian-->Spouse or domestic partner--> Adult child-->Parent-->Sibling--> Close friend
- continue digoxin and apixaban 2.5mg BID. digoxin level normal
Current functional PPSv2: 50  Nursing care required: Needs assistance with ADLs  Code status documented: Full code  Link to [facility] note        Family Health Care Decision Act (FHCDA) Surrogate Decision Maker Hierarchy in the absence of a health care agent  [facility] Article 81 Guardian-->Spouse or domestic partner--> Adult child-->Parent-->Sibling--> Close friend
- continue digoxin and apixaban 2.5mg BID. digoxin level normal
- continue digoxin and systemic a/c, on therapeutic lovenox per cardiology. digoxin level normal
Current functional PPSv2: 50  Nursing care required: Needs assistance with ADLs  Code status documented: Full code  A review of the paper chart has been conducted  HCP form present:               Yes and valid []               Yes but invalid []                No [x]   MOLST present:                   Yes and valid []               Yes but invalid []                No [x]  Incapacity form present:   Yes and valid []                Yes but invalid []                No []                 N/A [x]  Living Will:                            Yes and valid []                Yes but invalid []                No [x]    Will clarify decision making preferences    Family Health Care Decision Act (FHCDA) Surrogate Decision Maker Hierarchy in the absence of a health care agent  [facility] Article 81 Guardian-->Spouse or domestic partner--> Adult child-->Parent-->Sibling--> Close friend
- continue digoxin and systemic a/c, on therapeutic lovenox per cardiology. digoxin level normal
- continue digoxin and systemic a/c, on therapeutic lovenox per cardiology. digoxin level normal

## 2021-09-15 NOTE — PROGRESS NOTE ADULT - PROBLEM SELECTOR PROBLEM 4
Acute on chronic systolic congestive heart failure
Transaminitis
Acute on chronic systolic congestive heart failure
Transaminitis
Transaminitis
Acute on chronic systolic congestive heart failure
Transaminitis
Transaminitis

## 2021-09-15 NOTE — PROVIDER CONTACT NOTE (OTHER) - NAME OF MD/NP/PA/DO NOTIFIED:
HEATH Caldera
HEATH Blackwood
HEATH Blackwood
Sammie li PA
michael read NP
HEATH Blackwood
HEATH Guillen
KAILA Stallings

## 2021-09-15 NOTE — PROVIDER CONTACT NOTE (OTHER) - BACKGROUND
Admit diagnosis: heart failure
Patient admitted with heart failure
Pt admitted for CHF exacerbation on lasix gtt. Pt with hx of COPD, asthma, DM, CVA
pt admitted to the hospital for HF , on lasix drip
pt admitted to the hospital for HF on  lasix infusion ,
pt admitted to the hospital for HF on PO lasix  ,
pt is 86 y/o F admitted for heart failure. pmh cva, dm, chf, copd.
Patient admitted with heart failure, with PPM, underlying rhythm aflutter with SVR. PMH severe MV regurgitation and severe pulmonary HTN. On Bumex gtt at 5mL/hr and dobutamine gtt at 3.4mL/hr.

## 2021-09-15 NOTE — PROGRESS NOTE ADULT - PROBLEM SELECTOR PROBLEM 5
Palliative care encounter
JUANY (acute kidney injury)
JUANY (acute kidney injury)
Palliative care encounter
JUANY (acute kidney injury)
Palliative care encounter

## 2021-09-15 NOTE — PROGRESS NOTE ADULT - PROBLEM SELECTOR PROBLEM 3
Chronic atrial fibrillation
Chronic atrial fibrillation
Debility
Chronic atrial fibrillation
Debility
Chronic atrial fibrillation
Chronic atrial fibrillation
Debility

## 2021-09-15 NOTE — PROVIDER CONTACT NOTE (OTHER) - REASON
pain at chairez
Patient C/O mild SOB
Patient lethargic with minimal blank stare
20 beats of WCT on tele, hx of 28 beats
6 beats of WCT on tele
pt had 28beats f WCT on tele
Pt with 26 beats WCT
Tele event - Possible PMT vs AIVR?

## 2021-09-15 NOTE — PROGRESS NOTE ADULT - SUBJECTIVE AND OBJECTIVE BOX
DATE OF SERVICE:  09/15/2021    pt seen and examined    MEDICATIONS  (STANDING):  apixaban 2.5 milliGRAM(s) Oral two times a day  ascorbic acid 500 milliGRAM(s) Oral daily  carvedilol 3.125 milliGRAM(s) Oral every 12 hours  cefTRIAXone   IVPB 1000 milliGRAM(s) IV Intermittent every 24 hours  chlorhexidine 4% Liquid 1 Application(s) Topical daily  dextrose 40% Gel 15 Gram(s) Oral once  dextrose 5%. 1000 milliLiter(s) (50 mL/Hr) IV Continuous <Continuous>  dextrose 5%. 1000 milliLiter(s) (100 mL/Hr) IV Continuous <Continuous>  dextrose 50% Injectable 12.5 Gram(s) IV Push once  dextrose 50% Injectable 25 Gram(s) IV Push once  dextrose 50% Injectable 25 Gram(s) IV Push once  digoxin     Tablet 125 MICROGram(s) Oral daily  furosemide    Tablet 40 milliGRAM(s) Oral daily  glucagon  Injectable 1 milliGRAM(s) IntraMuscular once  insulin lispro (ADMELOG) corrective regimen sliding scale   SubCutaneous three times a day before meals  insulin lispro (ADMELOG) corrective regimen sliding scale   SubCutaneous at bedtime  multivitamin 1 Tablet(s) Oral daily  nystatin Powder 1 Application(s) Topical two times a day  pantoprazole    Tablet 40 milliGRAM(s) Oral before breakfast  polyethylene glycol 3350 17 Gram(s) Oral daily  sacubitril 24 mG/valsartan 26 mG 1 Tablet(s) Oral two times a day  senna 2 Tablet(s) Oral at bedtime  spironolactone 25 milliGRAM(s) Oral daily    MEDICATIONS  (PRN):  acetaminophen   Tablet .. 650 milliGRAM(s) Oral every 6 hours PRN Temp greater or equal to 38C (100.4F), Mild Pain (1 - 3)  benzocaine 15 mG/menthol 3.6 mG (Sugar-Free) Lozenge 1 Lozenge Oral every 2 hours PRN Sore Throat  haloperidol    Injectable 0.25 milliGRAM(s) IV Push every 8 hours PRN Agitation  hemorrhoidal Ointment 1 Application(s) Rectal four times a day PRN rectal irritation    Vital Signs Last 24 Hrs  T(C): 36.4 (09-15-21 @ 17:08), Max: 36.7 (09-15-21 @ 04:06)  T(F): 97.6 (09-15-21 @ 17:08), Max: 98.1 (09-15-21 @ 04:06)  HR: 87 (09-15-21 @ 17:08) (68 - 87)  BP: 95/63 (09-15-21 @ 17:08) (92/62 - 121/74)  BP(mean): --  RR: 18 (09-15-21 @ 17:08) (18 - 18)  SpO2: 95% (09-15-21 @ 17:08) (94% - 97%)      Lungs: dec breath sounds at bases  Heart: Regular rate and rhythm;  Abdomen: soft , bs+  Extremities: edema+  Nervous system:  Alert awake  Psychiatric: Normal affect  Skin: No rashes or lesions    LABS:  09-15    144  |  98  |  48<H>  ----------------------------<  128<H>  4.0   |  35<H>  |  0.95    Ca    9.3      15 Sep 2021 06:40      Creatinine Trend: 0.95 <--, 0.82 <--, 0.81 <--, 0.82 <--, 1.11 <--, 0.89 <--                        9.3    10.50 )-----------( 204      ( 15 Sep 2021 06:40 )             32.7     Urine Studies:  Urinalysis Basic - ( 14 Sep 2021 03:59 )    Color: Yellow / Appearance: Turbid / S.015 / pH:   Gluc:  / Ketone: Negative  / Bili: Negative / Urobili: 2 mg/dL   Blood:  / Protein: 30 mg/dL / Nitrite: Negative   Leuk Esterase: Large / RBC: 6 /hpf /  /HPF   Sq Epi:  / Non Sq Epi: 3 /hpf / Bacteria: Many                            LIVER FUNCTIONS - ( 13 Sep 2021 08:04 )  Alb: 3.3 g/dL / Pro: 5.6 g/dL / ALK PHOS: 108 U/L / ALT: 23 U/L / AST: 22 U/L / GGT: x                 IMPRESSION AND PLAN:  85 YO F with with a history of ACC/AHA Stage C NICM with improved LVEF (LV 4.9 cm, LVEF 40-45%) s/p CRT-D, severe mitral regurgitation being considered for MitraClip at [facility], chronic AF/AFl, and DM2 who was admitted to [facility] with ADHF and transferred to [facility] for consideration of MitraClip    Problem/Plan - 1:  ·  Problem: Acute decompensated heart failure.   ·  Plan: - GDMT: continue losartan 25 mg daily and spironolactone 25 mg daily. Will start BB when euvolemic and uptitrate losartan as tolerates   - Device: s/p CRT with 92% BiV pacing  -Discussed GOC with tanner Shaw explained Ms Ryan overall frail condition,albeit improved LV EF her condition and prognosis is poor-considering discharge to Home with Home care.  -On Furosemide PO  .Pinola and Entresto  -Tolerating Coreg  -Awaiting discharge to SNF    Problem/Plan - 2:  ·  Problem: Severe mitral valve regurgitation.   ·  Plan: - Structural cardiology consulted for symptomatic severe MR, though in discussions with the team - the anatomic limitations of her mitral valve plus significant frailty would make her a poor candidate for any intervention.    Problem/Plan - 3:  ·  Problem: Chronic atrial fibrillation.   ·  Plan: - continue digoxin and systemic a/c, on therapeutic lovenox per cardiology. digoxin level normal.  -Resumed Eliquis      Problem/Plan - 4:  ·  Problem: Transaminitis.   ·  Plan: - due to congestion and resolved with diuresis.    Problem/Plan - 5:  ·  Problem: JUANY (acute kidney injury).

## 2021-09-15 NOTE — PROVIDER CONTACT NOTE (OTHER) - RECOMMENDATIONS
HEATH Cain made aware.
Provider notified, EKG obtained, stat BMP and mg collected, will continue to monitor.
provider made aware.
HEATH Cain to be notified.
HEATH Cain to be notified.
PA made aware
NP made aware
NP made aware

## 2021-09-15 NOTE — PROVIDER CONTACT NOTE (OTHER) - DATE AND TIME:
01-Sep-2021 23:20
13-Sep-2021 11:30
15-Sep-2021 06:39
05-Sep-2021 13:25
06-Sep-2021 18:31
10-Sep-2021 09:15
08-Sep-2021 02:50
06-Sep-2021 01:49

## 2021-09-16 LAB
-  AMIKACIN: SIGNIFICANT CHANGE UP
-  AMOXICILLIN/CLAVULANIC ACID: SIGNIFICANT CHANGE UP
-  AMPICILLIN/SULBACTAM: SIGNIFICANT CHANGE UP
-  AMPICILLIN: SIGNIFICANT CHANGE UP
-  AZTREONAM: SIGNIFICANT CHANGE UP
-  CEFAZOLIN: SIGNIFICANT CHANGE UP
-  CEFEPIME: SIGNIFICANT CHANGE UP
-  CEFOXITIN: SIGNIFICANT CHANGE UP
-  CEFTRIAXONE: SIGNIFICANT CHANGE UP
-  CIPROFLOXACIN: SIGNIFICANT CHANGE UP
-  ERTAPENEM: SIGNIFICANT CHANGE UP
-  GENTAMICIN: SIGNIFICANT CHANGE UP
-  IMIPENEM: SIGNIFICANT CHANGE UP
-  LEVOFLOXACIN: SIGNIFICANT CHANGE UP
-  MEROPENEM: SIGNIFICANT CHANGE UP
-  NITROFURANTOIN: SIGNIFICANT CHANGE UP
-  PIPERACILLIN/TAZOBACTAM: SIGNIFICANT CHANGE UP
-  TIGECYCLINE: SIGNIFICANT CHANGE UP
-  TOBRAMYCIN: SIGNIFICANT CHANGE UP
-  TRIMETHOPRIM/SULFAMETHOXAZOLE: SIGNIFICANT CHANGE UP
ANION GAP SERPL CALC-SCNC: 11 MMOL/L — SIGNIFICANT CHANGE UP (ref 5–17)
BUN SERPL-MCNC: 50 MG/DL — HIGH (ref 7–23)
CALCIUM SERPL-MCNC: 9.1 MG/DL — SIGNIFICANT CHANGE UP (ref 8.4–10.5)
CHLORIDE SERPL-SCNC: 94 MMOL/L — LOW (ref 96–108)
CO2 SERPL-SCNC: 34 MMOL/L — HIGH (ref 22–31)
CREAT SERPL-MCNC: 0.86 MG/DL — SIGNIFICANT CHANGE UP (ref 0.5–1.3)
CULTURE RESULTS: SIGNIFICANT CHANGE UP
GLUCOSE BLDC GLUCOMTR-MCNC: 131 MG/DL — HIGH (ref 70–99)
GLUCOSE BLDC GLUCOMTR-MCNC: 164 MG/DL — HIGH (ref 70–99)
GLUCOSE BLDC GLUCOMTR-MCNC: 185 MG/DL — HIGH (ref 70–99)
GLUCOSE BLDC GLUCOMTR-MCNC: 195 MG/DL — HIGH (ref 70–99)
GLUCOSE SERPL-MCNC: 122 MG/DL — HIGH (ref 70–99)
HCT VFR BLD CALC: 32.1 % — LOW (ref 34.5–45)
HGB BLD-MCNC: 9 G/DL — LOW (ref 11.5–15.5)
MCHC RBC-ENTMCNC: 22 PG — LOW (ref 27–34)
MCHC RBC-ENTMCNC: 28 GM/DL — LOW (ref 32–36)
MCV RBC AUTO: 78.3 FL — LOW (ref 80–100)
METHOD TYPE: SIGNIFICANT CHANGE UP
NRBC # BLD: 0 /100 WBCS — SIGNIFICANT CHANGE UP (ref 0–0)
ORGANISM # SPEC MICROSCOPIC CNT: SIGNIFICANT CHANGE UP
ORGANISM # SPEC MICROSCOPIC CNT: SIGNIFICANT CHANGE UP
PLATELET # BLD AUTO: 219 K/UL — SIGNIFICANT CHANGE UP (ref 150–400)
POTASSIUM SERPL-MCNC: 3.7 MMOL/L — SIGNIFICANT CHANGE UP (ref 3.5–5.3)
POTASSIUM SERPL-SCNC: 3.7 MMOL/L — SIGNIFICANT CHANGE UP (ref 3.5–5.3)
RBC # BLD: 4.1 M/UL — SIGNIFICANT CHANGE UP (ref 3.8–5.2)
RBC # FLD: 18.6 % — HIGH (ref 10.3–14.5)
SODIUM SERPL-SCNC: 139 MMOL/L — SIGNIFICANT CHANGE UP (ref 135–145)
SPECIMEN SOURCE: SIGNIFICANT CHANGE UP
WBC # BLD: 10.49 K/UL — SIGNIFICANT CHANGE UP (ref 3.8–10.5)
WBC # FLD AUTO: 10.49 K/UL — SIGNIFICANT CHANGE UP (ref 3.8–10.5)

## 2021-09-16 PROCEDURE — 99222 1ST HOSP IP/OBS MODERATE 55: CPT

## 2021-09-16 RX ADMIN — CEFTRIAXONE 100 MILLIGRAM(S): 500 INJECTION, POWDER, FOR SOLUTION INTRAMUSCULAR; INTRAVENOUS at 20:25

## 2021-09-16 RX ADMIN — Medication 1: at 11:23

## 2021-09-16 RX ADMIN — Medication 40 MILLIGRAM(S): at 06:05

## 2021-09-16 RX ADMIN — Medication 500 MILLIGRAM(S): at 11:23

## 2021-09-16 RX ADMIN — SPIRONOLACTONE 25 MILLIGRAM(S): 25 TABLET, FILM COATED ORAL at 06:05

## 2021-09-16 RX ADMIN — NYSTATIN CREAM 1 APPLICATION(S): 100000 CREAM TOPICAL at 06:05

## 2021-09-16 RX ADMIN — CARVEDILOL PHOSPHATE 3.12 MILLIGRAM(S): 80 CAPSULE, EXTENDED RELEASE ORAL at 06:06

## 2021-09-16 RX ADMIN — PANTOPRAZOLE SODIUM 40 MILLIGRAM(S): 20 TABLET, DELAYED RELEASE ORAL at 06:05

## 2021-09-16 RX ADMIN — SACUBITRIL AND VALSARTAN 1 TABLET(S): 24; 26 TABLET, FILM COATED ORAL at 06:07

## 2021-09-16 RX ADMIN — APIXABAN 2.5 MILLIGRAM(S): 2.5 TABLET, FILM COATED ORAL at 06:05

## 2021-09-16 RX ADMIN — POLYETHYLENE GLYCOL 3350 17 GRAM(S): 17 POWDER, FOR SOLUTION ORAL at 11:23

## 2021-09-16 RX ADMIN — NYSTATIN CREAM 1 APPLICATION(S): 100000 CREAM TOPICAL at 17:33

## 2021-09-16 RX ADMIN — Medication 1: at 07:43

## 2021-09-16 RX ADMIN — Medication 125 MICROGRAM(S): at 06:05

## 2021-09-16 RX ADMIN — SENNA PLUS 2 TABLET(S): 8.6 TABLET ORAL at 22:35

## 2021-09-16 RX ADMIN — Medication 1 TABLET(S): at 11:23

## 2021-09-16 RX ADMIN — APIXABAN 2.5 MILLIGRAM(S): 2.5 TABLET, FILM COATED ORAL at 17:32

## 2021-09-16 RX ADMIN — CHLORHEXIDINE GLUCONATE 1 APPLICATION(S): 213 SOLUTION TOPICAL at 11:25

## 2021-09-16 RX ADMIN — SACUBITRIL AND VALSARTAN 1 TABLET(S): 24; 26 TABLET, FILM COATED ORAL at 17:32

## 2021-09-16 RX ADMIN — CARVEDILOL PHOSPHATE 3.12 MILLIGRAM(S): 80 CAPSULE, EXTENDED RELEASE ORAL at 17:33

## 2021-09-16 NOTE — CONSULT NOTE ADULT - ASSESSMENT
85F Irish-speaking w/ hx of severe MV regurgitation, HFrEF s/p AICD, chronic afib, DM2, HTN, hyperthyroidism presented initially to [facility] with acute heart failure, transferred to [facility] for further management.    Overall leucocytosis, abnormal U/A with symptoms.  + urine cx concerning for UTI        Plan:   c/w ceftriaxone   follow up final urine cx results   trend CBC, leucocytosis resolved.   plan for short course.     Plan discussed with Medicine Attending.     Keenan Larsen  Pager: [phone]. If no response or past 5 pm call [phone].

## 2021-09-16 NOTE — PROGRESS NOTE ADULT - SUBJECTIVE AND OBJECTIVE BOX
DATE OF SERVICE:  2021    pt seen and examined    MEDICATIONS  (STANDING):  apixaban 2.5 milliGRAM(s) Oral two times a day  ascorbic acid 500 milliGRAM(s) Oral daily  carvedilol 3.125 milliGRAM(s) Oral every 12 hours  cefTRIAXone   IVPB 1000 milliGRAM(s) IV Intermittent every 24 hours  chlorhexidine 4% Liquid 1 Application(s) Topical daily  dextrose 40% Gel 15 Gram(s) Oral once  dextrose 5%. 1000 milliLiter(s) (50 mL/Hr) IV Continuous <Continuous>  dextrose 5%. 1000 milliLiter(s) (100 mL/Hr) IV Continuous <Continuous>  dextrose 50% Injectable 25 Gram(s) IV Push once  dextrose 50% Injectable 12.5 Gram(s) IV Push once  dextrose 50% Injectable 25 Gram(s) IV Push once  digoxin     Tablet 125 MICROGram(s) Oral daily  furosemide    Tablet 40 milliGRAM(s) Oral daily  glucagon  Injectable 1 milliGRAM(s) IntraMuscular once  insulin lispro (ADMELOG) corrective regimen sliding scale   SubCutaneous three times a day before meals  insulin lispro (ADMELOG) corrective regimen sliding scale   SubCutaneous at bedtime  multivitamin 1 Tablet(s) Oral daily  nystatin Powder 1 Application(s) Topical two times a day  pantoprazole    Tablet 40 milliGRAM(s) Oral before breakfast  polyethylene glycol 3350 17 Gram(s) Oral daily  sacubitril 24 mG/valsartan 26 mG 1 Tablet(s) Oral two times a day  senna 2 Tablet(s) Oral at bedtime  spironolactone 25 milliGRAM(s) Oral daily    MEDICATIONS  (PRN):  acetaminophen   Tablet .. 650 milliGRAM(s) Oral every 6 hours PRN Temp greater or equal to 38C (100.4F), Mild Pain (1 - 3)  benzocaine 15 mG/menthol 3.6 mG (Sugar-Free) Lozenge 1 Lozenge Oral every 2 hours PRN Sore Throat  haloperidol    Injectable 0.25 milliGRAM(s) IV Push every 8 hours PRN Agitation  hemorrhoidal Ointment 1 Application(s) Rectal four times a day PRN rectal irritation    Vital Signs Last 24 Hrs  T(C): 36.5 (21 @ 20:13), Max: 36.9 (21 @ 04:00)  T(F): 97.7 (21 @ 20:13), Max: 98.4 (21 @ 04:00)  HR: 70 (21 @ 20:13) (70 - 78)  BP: 100/63 (21 @ 20:13) (90/48 - 132/72)  BP(mean): --  RR: 18 (21 @ 20:13) (17 - 18)  SpO2: 97% (21 @ 20:13) (94% - 98%)      Lungs: dec breath sounds at bases  Heart: Regular rate and rhythm;  Abdomen: soft , bs+  Extremities: edema+  Nervous system:  Alert awake  Psychiatric: Normal affect  Skin: No rashes or lesions    LABS:      139  |  94<L>  |  50<H>  ----------------------------<  122<H>  3.7   |  34<H>  |  0.86    Ca    9.1      16 Sep 2021 07:23      Creatinine Trend: 0.86 <--, 0.95 <--, 0.82 <--, 0.81 <--, 0.82 <--, 1.11 <--                        9.0    10.49 )-----------( 219      ( 16 Sep 2021 07:23 )             32.1     Urine Studies:  Urinalysis Basic - ( 14 Sep 2021 03:59 )    Color: Yellow / Appearance: Turbid / S.015 / pH:   Gluc:  / Ketone: Negative  / Bili: Negative / Urobili: 2 mg/dL   Blood:  / Protein: 30 mg/dL / Nitrite: Negative   Leuk Esterase: Large / RBC: 6 /hpf /  /HPF   Sq Epi:  / Non Sq Epi: 3 /hpf / Bacteria: Many                    IMPRESSION AND PLAN:  87 YO F with with a history of ACC/AHA Stage C NICM with improved LVEF (LV 4.9 cm, LVEF 40-45%) s/p CRT-D, severe mitral regurgitation being considered for MitraClip at [facility], chronic AF/AFl, and DM2 who was admitted to [facility] with ADHF and transferred to [facility] for consideration of MitraClip    Problem/Plan - 1:  ·  Problem: Acute decompensated heart failure.   ·  Plan: - GDMT: continue losartan 25 mg daily and spironolactone 25 mg daily. Will start BB when euvolemic and uptitrate losartan as tolerates   - Device: s/p CRT with 92% BiV pacing  -Discussed GOC with tanner Shaw explained Ms Ryan overall frail condition,albeit improved LV EF her condition and prognosis is poor-considering discharge to Home with Home care.  -On Furosemide PO  .Andrew and Entresto  -Tolerating Coreg  -Awaiting discharge to SNF    Problem/Plan - 2:  ·  Problem: Severe mitral valve regurgitation.   ·  Plan: - Structural cardiology consulted for symptomatic severe MR, though in discussions with the team - the anatomic limitations of her mitral valve plus significant frailty would make her a poor candidate for any intervention.    Problem/Plan - 3:  ·  Problem: Chronic atrial fibrillation.   ·  Plan: - continue digoxin and systemic a/c, on therapeutic lovenox per cardiology. digoxin level normal.  -Resumed Eliquis      Problem/Plan - 4:  ·  Problem: Transaminitis.   ·  Plan: - due to congestion and resolved with diuresis.    Problem/Plan - 5:  ·  Problem: JUANY (acute kidney injury).     started on abx for uti ,  ID f/u

## 2021-09-16 NOTE — CONSULT NOTE ADULT - SUBJECTIVE AND OBJECTIVE BOX
Patient seen and evaluated at bedside    Chief Complaint:    HPI:  Ms. Davis is a 85 year old F pmh hyperthyroid, asthma, dementia, t2dm, atrial fibrillation/flutter s/p AICD ?2015, heart failure with EF if 55% with severe tricuspid and mitral regurgitation who presents from [facility] for evaluation for mitral clip.    Prior to her present hospitalization, she first presented to [facility] from an OSH in 7/2021. At the OSH she was intubated for respiratory failure but was shortly extubated 16 hours after being received at [facility]. She received IV diuresis and received a LHC, RHC, and MILAGROS the conclusion of which was plan for outpatient mitral valve clip placement. Course was complicated by a R cephalic vein thrombus for which she was started on eliquis. She was optimized on losartan 25 BID, isordil 60, and torsemide 30 mg PO daily and was discharged to rehab.    Then, she became increasingly short of breath over 2 weeks and patient states she stopped urinating (though limited as a historian). CXR outpatient demonstrated pulmonary edema and right pleural effusion. She was sent to [facility] for these worsening symptoms where severe mitral and tricuspid regurgitation was again found with an EF of 55%. She was diuresed with lasix 40 IV BID and started on dobutamine drip to assist diuresis. She was transferred to [facility] for evaluation for mitral clip.    PMHx:   CHF (Congestive Heart Failure)    COPD (Chronic Obstructive Pulmonary Disease)    Asthma    DM (Diabetes Mellitus)    Artificial Cardiac Pacemaker    Arrhythmia    CVA (Cerebral Vascular Accident)        PSHx:   Presence of cardiac defibrillator        Allergies:  No Known Allergies      Current Medications:   acetaminophen   Tablet .. 650 milliGRAM(s) Oral every 6 hours PRN  dextrose 40% Gel 15 Gram(s) Oral once  dextrose 5%. 1000 milliLiter(s) IV Continuous <Continuous>  dextrose 5%. 1000 milliLiter(s) IV Continuous <Continuous>  dextrose 50% Injectable 25 Gram(s) IV Push once  dextrose 50% Injectable 12.5 Gram(s) IV Push once  dextrose 50% Injectable 25 Gram(s) IV Push once  digoxin  Injectable 125 MICROGram(s) IV Push daily  DOBUTamine Infusion 2.504 MICROgram(s)/kG/Min IV Continuous <Continuous>  enoxaparin Injectable 50 milliGRAM(s) SubCutaneous daily  furosemide   Injectable 40 milliGRAM(s) IV Push every 12 hours  glucagon  Injectable 1 milliGRAM(s) IntraMuscular once  insulin lispro (ADMELOG) corrective regimen sliding scale   SubCutaneous three times a day before meals  insulin lispro (ADMELOG) corrective regimen sliding scale   SubCutaneous at bedtime  pantoprazole    Tablet 40 milliGRAM(s) Oral before breakfast      FAMILY HISTORY:  No pertinent family history in first degree relatives        Social History:  Smoking History:  Alcohol Use:  Drug Use:    REVIEW OF SYSTEMS:  Constitutional:     [ ] negative [ ] fevers [ ] chills [ ] weight loss [ ] weight gain  HEENT:                  [ ] negative [ ] dry eyes [ ] eye irritation [ ] postnasal drip [ ] nasal congestion  CV:                         [ ] negative  [ ] chest pain [ ] orthopnea [ ] palpitations [ ] murmur  Resp:                     [ ] negative [ ] cough [ ] shortness of breath [ ] dyspnea [ ] wheezing [ ] sputum [ ]hemoptysis  GI:                          [ ] negative [ ] nausea [ ] vomiting [ ] diarrhea [ ] constipation [ ] abd pain [ ] dysphagia   :                        [ ] negative [ ] dysuria [ ] nocturia [ ] hematuria [ ] increased urinary frequency  Musculoskeletal: [ ] negative [ ] back pain [ ] myalgias [ ] arthralgias [ ] fracture  Skin:                       [ ] negative [ ] rash [ ] itch  Neurological:        [ ] negative [ ] headache [ ] dizziness [ ] syncope [ ] weakness [ ] numbness  Psychiatric:           [ ] negative [ ] anxiety [ ] depression  Endocrine:            [ ] negative [ ] diabetes [ ] thyroid problem  Heme/Lymph:      [ ] negative [ ] anemia [ ] bleeding problem  Allergic/Immune: [ ] negative [ ] itchy eyes [ ] nasal discharge [ ] hives [ ] angioedema    [ ] All other systems negative  [X] Unable to assess ROS due limited orientation      Physical Exam:  T(F): 97.6 (08-30), Max: 97.8 (08-29)  HR: 118 (08-30) (79 - 121)  BP: 102/70 (08-30) (102/70 - 115/80)  RR: 18 (08-30)  SpO2: 100% (08-30)  GENERAL: Sitting up in bed with a tray of food in NAD asking for ice.  HEAD:  Atraumatic, Normocephalic  ENT: no thyromegaly, no lad  CHEST/LUNG: Clear to auscultation bilaterally; No wheeze. Decreased breath sounds at right base. dullness to percussion in right lower field. Decr breath sounds in all R even upper lobes compared to L. Crackles at L lower base.  BACK: No spinal tenderness  HEART: Holosystolic murmur best heard at axilla. No radiation to carotids. JVP at ~10-12 cm (earlobe, sitting up)  ABDOMEN: Soft, Nontender, Nondistended; Bowel sounds present. trace abdominal anasarca.  EXTREMITIES: Bilateral pitting edema to the knees  PSYCH: Nl behavior, nl affect  NEUROLOGY: AAOx2 (self, hospital, 2020), non-focal, cranial nerves intact  SKIN: Normal color, No rashes or lesions    Cardiovascular Diagnostic Testing:    ECG: Personally reviewed:  Atrial flutter with v pacing    Echo: Personally reviewed:  echo< from: Transthoracic Echocardiogram (08.27.21 @ 08:59) >  CONCLUSIONS:  1. Moderate to severe, eccentric postetriorly directed  mitral regurgitation.  2.  Mild aortic regurgitation.  3. Severely dilated left atrium.  LA volume index = 95  cc/m2.  4. Normal left ventricular internal dimensions and wall  thicknesses.  5. Endocardium not well visualized; grossly low normal left  ventricular systolic function.   Segmental wallmotion  could not be assessed.  6. Unable to asses diastolic function due to technical  aspects of this study.  7. Normal right ventricular size and function.   A device  lead is visualized in the right heart.  8. RV systolic pressure is 62 mm Hg. Severe pulmonary  hypertension.  9. There is severe tricuspid regurgitation.    < end of copied text >      Cath:  c< from: Cardiac Cath Lab (03.21.11 @ 16:28) >  Ventricles: Global left ventricular function was severely depressed.  Valves: Mitral valve: The mitral valve exhibited moderate to severe (3+/4+)  regurgitation.  Coronary vessels: The coronary circulation is right dominant.  LM:      LM: Angiography showed minor luminal irregularities with no flow  limiting lesions.  LAD:      LAD: Normal.  CX:      Proximal circumflex: Angiography showed mild atherosclerosis with  no flow limiting lesions.  RCA:      RCA: Normal.    < end of copied text >  < from: Cardiac Cath Lab (03.21.11 @ 16:28) >    Pressures:  -- Aortic Pressure (S/D/M): 126/55/84  Pressures:  -- Left Ventricle (s/edp): 100/19/--  Pressures:  -- Pulmonary Artery (S/D/M): 62/24/39  Pressures:  -- Pulmonary Capillary Wedge: 23/20/18  Pressures:  -- Right Atrium (a/v/M): 18/15/14  Pressures:  -- Right Ventricle (s/edp): 62/16/--    < end of copied text >      Imaging:    CXR: Personally reviewed  bilateral pleural effusions, pulm edema    Labs: Personally reviewed                        9.8    10.86 )-----------( 129      ( 30 Aug 2021 06:19 )             33.2     08-30    138  |  93<L>  |  59<H>  ----------------------------<  149<H>  4.2   |  28  |  1.07    Ca    8.3<L>      30 Aug 2021 06:19  Mg     2.2     08-30    TPro  6.3  /  Alb  3.8  /  TBili  1.6<H>  /  DBili  x   /  AST  52<H>  /  ALT  119<H>  /  AlkPhos  224<H>  08-30    PT/INR - ( 30 Aug 2021 06:19 )   PT: 15.0 sec;   INR: 1.26 ratio         PTT - ( 30 Aug 2021 06:19 )  PTT:51.3 sec  Serum Pro-Brain Natriuretic Peptide: 3718 pg/mL (08-26 @ 17:16)        Thyroid Stimulating Hormone, Serum: 1.79 uIU/mL (08-30 @ 09:28)  
Chief Complaint:  Patient is a 86y old  Female who presents with a chief complaint of 85F transferred from [facility] to [facility] for acute heart failure w/ severe Mitral Regurgitation (31 Aug 2021 16:18)      HPI:  Patient seen and evaluated this morning using  #[id number]. Oriented to person and place, at baseline.       Allergies:  No Known Allergies      Home Medications:    Hospital Medications:  acetaminophen   Tablet .. 650 milliGRAM(s) Oral every 6 hours PRN  ascorbic acid 500 milliGRAM(s) Oral daily  dextrose 40% Gel 15 Gram(s) Oral once  dextrose 5%. 1000 milliLiter(s) IV Continuous <Continuous>  dextrose 5%. 1000 milliLiter(s) IV Continuous <Continuous>  dextrose 50% Injectable 25 Gram(s) IV Push once  dextrose 50% Injectable 12.5 Gram(s) IV Push once  dextrose 50% Injectable 25 Gram(s) IV Push once  digoxin  Injectable 125 MICROGram(s) IV Push daily  DOBUTamine Infusion 2.504 MICROgram(s)/kG/Min IV Continuous <Continuous>  enoxaparin Injectable 50 milliGRAM(s) SubCutaneous daily  furosemide   Injectable 80 milliGRAM(s) IV Push every 12 hours  glucagon  Injectable 1 milliGRAM(s) IntraMuscular once  hemorrhoidal Ointment 1 Application(s) Rectal four times a day PRN  insulin lispro (ADMELOG) corrective regimen sliding scale   SubCutaneous three times a day before meals  insulin lispro (ADMELOG) corrective regimen sliding scale   SubCutaneous at bedtime  losartan 25 milliGRAM(s) Oral daily  pantoprazole    Tablet 40 milliGRAM(s) Oral before breakfast  polyethylene glycol 3350 17 Gram(s) Oral daily  senna 2 Tablet(s) Oral at bedtime      PMHX/PSHX:  CHF (Congestive Heart Failure)    COPD (Chronic Obstructive Pulmonary Disease)    Asthma    DM (Diabetes Mellitus)    Artificial Cardiac Pacemaker    Arrhythmia    CVA (Cerebral Vascular Accident)    Presence of cardiac defibrillator      Family history:  No pertinent family history in first degree relatives      ROS:     General:  No weight loss, fevers, chills, night sweats, fatigue  Eyes:  No vision changes, no yellowing of eyes   ENT:  + Throat pain, no runny nose  CV:  No chest pain, palpitations  Resp:  No SOB, cough, wheezing  GI:  See HPI  :  No burning with urination, no hematuria   Muscle:  No muscle pain, weakness  Neuro:  No numbness/tingling, memory problems  Psych:  No fatigue, insomnia, mood problems  Heme:  No easy bruisability  Skin:  No rash, itching       PHYSICAL EXAM:     GENERAL:  Appears stated age, well-groomed, well-nourished, no distress  HEENT:  NC/AT,  conjunctivae clear and pink,  no JVD  CHEST:  Full & symmetric excursion, no increased effort, breath sounds clear  HEART:  Regular rhythm, S1, S2, no murmur/rub/S3/S4, no abdominal bruit, no edema  ABDOMEN:  Soft, non-tender, non-distended, normoactive bowel sounds, no masses  EXTREMITIES:  no cyanosis,clubbing or edema  SKIN:  No rash/erythema/ecchymoses/petechiae/wounds/abscess/warm/dry  NEURO:  Alert, oriented    Vital Signs:  Vital Signs Last 24 Hrs  T(C): 36.4 (01 Sep 2021 08:30), Max: 36.7 (01 Sep 2021 04:13)  T(F): 97.5 (01 Sep 2021 08:30), Max: 98 (01 Sep 2021 04:13)  HR: 100 (01 Sep 2021 08:30) (58 - 107)  BP: 99/66 (01 Sep 2021 08:30) (94/56 - 112/62)  BP(mean): --  RR: 17 (01 Sep 2021 08:30) (17 - 174)  SpO2: 95% (01 Sep 2021 08:30) (95% - 100%)  Daily     Daily Weight in k.3 (01 Sep 2021 08:30)    LABS:                        9.8    11.58 )-----------( 128      ( 01 Sep 2021 06:17 )             33.6     09    133<L>  |  93<L>  |  69<H>  ----------------------------<  98  4.9   |  29  |  1.05    Ca    8.0<L>      01 Sep 2021 06:16  Mg     2.3     09-    TPro  6.1  /  Alb  3.5  /  TBili  2.3<H>  /  DBili  1.5<H>  /  AST  61<H>  /  ALT  119<H>  /  AlkPhos  262<H>  08-31    LIVER FUNCTIONS - ( 31 Aug 2021 06:48 )  Alb: 3.5 g/dL / Pro: 6.1 g/dL / ALK PHOS: 262 U/L / ALT: 119 U/L / AST: 61 U/L / GGT: x         
Patient is a 86y old  Female who presents with a chief complaint of 85F transferred from [facility] to [facility] for acute heart failure w/ severe Mitral Regurgitation (16 Sep 2021 06:12)      HPI:  Note patient is A&Ox1 at time of admit to [facility] and therefore history is obtained is limited to previous charting. baseline mental status also suggested dementia reporting A&Ox2 baseline at [facility]  85F Albanian-speaking w/ hx of severe MV regurgitation, HFrEF s/p AICD, chronic afib, DM2, HTN, hyperthyroidism presented initially to [facility] with acute heart failure now transferred to [facility] for further management. The patient was admitted initially to [facility] 8/26 with symptoms of sob and lower extremity swelling in setting of delaying mitral clip at [facility] reportedly planned 1mo prior. She required dobutamine for IV furosemide diuresis, completed TTE demonstrating severe MR, TR, pulmHTN, and grossly low normal left ventricular systolic function. She additionally was identified to have dysphagia and completed speech and swallow which identify need for dysphagia diet. Wound care RN additionally identified and were treating decubitus wounds which were present prior to hospitalization. (29 Aug 2021 23:34)  Above reviewed:  Acute on chronic systolic CHF exacerbation: s/p diuresis   s/p AICD interro w/ normal function w/ underlying rhythm of Aflutter w/ SVR & ATach  Hx Severe MR - not a candidate for mitraclip  UA +, UC GNR 100K awaiting sensitivities- on CTX  Pt complains of suprapubic pain and dysuria. She had chairez during admission, which is now removed.       PAST MEDICAL & SURGICAL HISTORY:  CHF (Congestive Heart Failure)    COPD (Chronic Obstructive Pulmonary Disease)    Asthma    DM (Diabetes Mellitus)    Artificial Cardiac Pacemaker    Arrhythmia    CVA (Cerebral Vascular Accident)    Presence of cardiac defibrillator        REVIEW OF SYSTEMS: ? reliability due to underlying dementia.     General: Denies any chills. Fevers absent    Skin: No rash  	  Ophthalmologic: Denies any discharge, redness.  	  ENT: No nasal congestion or throat pain.     Respiratory and Thorax: resolved shortness of breath.  	  Cardiovascular: No chest pain,     Gastrointestinal: No nausea,  diarrhea.    Genitourinary: per hpi    Musculoskeletal: No joint swelling     Neurological: No extremity weakness.    Extremities: no swelling     Endocrine: No polyuria or polydipsia     Allergic/Immunologic:	No hives         Social history:  lives with relatives, no smoking       FAMILY HISTORY:  No pertinent family history of heart ds in first degree relatives  ? reliability due to underlying dementia.       Allergies  No Known Allergies      Antimicrobials:  cefTRIAXone   IVPB 1000 milliGRAM(s) IV Intermittent every 24 hours        Vital Signs Last 24 Hrs  T(C): 36.6 (16 Sep 2021 11:32), Max: 36.9 (16 Sep 2021 04:00)  T(F): 97.9 (16 Sep 2021 11:32), Max: 98.4 (16 Sep 2021 04:00)  HR: 78 (16 Sep 2021 17:31) (70 - 78)  BP: 101/71 (16 Sep 2021 17:31) (90/48 - 132/72)  BP(mean): --  RR: 18 (16 Sep 2021 11:32) (17 - 18)  SpO2: 97% (16 Sep 2021 11:32) (94% - 98%)      PHYSICAL EXAM: Patient in no acute distress.    Constitutional: Comfortable. Awake and alert, sitting in recliner.     Eyes: No discharge or conjunctival injection    ENMT: No thrush. No pharyngeal exudate     Neck: Supple,    Respiratory: + air entry bilaterally.    Cardiovascular: S1 S2 wnl, + murmurs. lt sided ICD.     Gastrointestinal: Soft BS(+) suprapubic tenderness, non distended.    Genitourinary: No CVA tenderness     Extremities: trace edema.    Vascular: peripheral pulses felt    Skin: No rash, rt arm PICC     Musculoskeletal: No joint swelling.    Psychiatric: Affect normal.                              9.0    10.49 )-----------( 219      ( 16 Sep 2021 07:23 )             32.1       09-16    139  |  94<L>  |  50<H>  ----------------------------<  122<H>  3.7   |  34<H>  |  0.86    Ca    9.1      16 Sep 2021 07:23      Urinalysis (09.14.21 @ 03:59)   pH Urine: 6.0   Glucose Qualitative, Urine: Negative   Blood, Urine: Small   Color: Yellow   Urine Appearance: Turbid   Bilirubin: Negative   Ketone - Urine: Negative   Specific Gravity: 1.015   Protein, Urine: 30 mg/dL   Urobilinogen: 2 mg/dL   Nitrite: Negative   Leukocyte Esterase Concentration: Large     Urine Microscopic-Add On (NC) (09.14.21 @ 03:59)   Bacteria: Many   Epithelial Cells: 3 /hpf   Red Blood Cell - Urine: 6 /hpf   White Blood Cell - Urine: 779 /HPF   Hyaline Casts: 1 /lpf     Culture - Urine (collected 14 Sep 2021 15:08)  Source: Clean Catch Clean Catch (Midstream)  Preliminary Report (15 Sep 2021 15:46):    >100,000 CFU/ml Gram Negative Rods        Radiology: Imaging reviewed and visualized personally [ x]  < from: Xray Chest 1 View- PORTABLE-Urgent (Xray Chest 1 View- PORTABLE-Urgent .) (09.04.21 @ 19:05) >  IMPRESSION:  Left cardiac device again noted. right PIC line noted tip overlying junction of SVC/right atrial region. No pneumothorax.    Bilateral pleural effusion/opacities without significant change    Heart size cannot be accurately assessed in this projection, but appear enlarged.    < from: US Abdomen Liver Followup (09.02.21 @ 15:01) >  IMPRESSION:    Dilated hepatic veins and IVC which can be seen with right-sided heart failure/passive hepatic congestion.    Right pleural effusion.                  
HPI:  Note patient is A&Ox1 at time of admit to [facility] and therefore history is obtained is limited to previous charting. baseline mental status also suggested dementia reporting A&Ox2 baseline at [facility]    85F Guatemalan-speaking w/ hx of severe MV regurgitation, HFrEF s/p AICD, chronic afib, DM2, HTN, hyperthyroidism presented initially to [facility] with acute heart failure now transferred to [facility] for further management. The patient was admitted initially to [facility] 8/26 with symptoms of sob and lower extremity swelling in setting of delaying mitral clip at [facility] reportedly planned 1mo prior. She required dobutamine for IV furosemide diuresis, completed TTE demonstrating severe MR, TR, pulmHTN, and grossly low normal left ventricular systolic function. She additionally was identified to have dysphagia and completed speech and swallow which identify need for dysphagia diet. Wound care RN additionally identified and were treating decubitus wounds which were present prior to hospitalization. (29 Aug 2021 23:34)    PERTINENT PM/SXH:   CHF (Congestive Heart Failure)    COPD (Chronic Obstructive Pulmonary Disease)    Asthma    DM (Diabetes Mellitus)    Artificial Cardiac Pacemaker    Arrhythmia    CVA (Cerebral Vascular Accident)      Presence of cardiac defibrillator      FAMILY HISTORY:  No pertinent family history in first degree relatives      ITEMS NOT CHECKED ARE NOT PRESENT    SOCIAL HISTORY:   Significant other/partner[ ]  Children[ ]  Judaism/Spirituality:  Substance hx:  [ ]   Tobacco hx:  [ ]   Alcohol hx: [ ]   Home Opioid hx:  [x] I-Stop Reference No:   #: [id number]  Living Situation: [ ]Home  [ ]Long term care  [ ]Rehab [ x]Other    ADVANCE DIRECTIVES:    DNR  MOLST  [ ]  Living Will  [ ]   DECISION MAKER(s):  [ ] Health Care Proxy(s)  [ ] Surrogate(s)  [ ] Guardian           Name(s): Phone Number(s):    BASELINE (I)ADL(s) (prior to admission):  [place]: [ ]Total  [ ] Moderate [ ]Dependent    Allergies  No Known Allergies  Intolerances    MEDICATIONS  (STANDING):  ascorbic acid 500 milliGRAM(s) Oral daily  dextrose 40% Gel 15 Gram(s) Oral once  dextrose 5%. 1000 milliLiter(s) (50 mL/Hr) IV Continuous <Continuous>  dextrose 5%. 1000 milliLiter(s) (100 mL/Hr) IV Continuous <Continuous>  dextrose 50% Injectable 25 Gram(s) IV Push once  dextrose 50% Injectable 12.5 Gram(s) IV Push once  dextrose 50% Injectable 25 Gram(s) IV Push once  digoxin  Injectable 125 MICROGram(s) IV Push daily  enoxaparin Injectable 50 milliGRAM(s) SubCutaneous daily  furosemide Infusion 20 mG/Hr (10 mL/Hr) IV Continuous <Continuous>  glucagon  Injectable 1 milliGRAM(s) IntraMuscular once  insulin lispro (ADMELOG) corrective regimen sliding scale   SubCutaneous three times a day before meals  insulin lispro (ADMELOG) corrective regimen sliding scale   SubCutaneous at bedtime  losartan 25 milliGRAM(s) Oral daily  pantoprazole    Tablet 40 milliGRAM(s) Oral before breakfast  polyethylene glycol 3350 17 Gram(s) Oral daily  senna 2 Tablet(s) Oral at bedtime  spironolactone 25 milliGRAM(s) Oral daily    MEDICATIONS  (PRN):  acetaminophen   Tablet .. 650 milliGRAM(s) Oral every 6 hours PRN Temp greater or equal to 38C (100.4F), Mild Pain (1 - 3)  benzocaine 15 mG/menthol 3.6 mG (Sugar-Free) Lozenge 1 Lozenge Oral every 2 hours PRN Sore Throat  hemorrhoidal Ointment 1 Application(s) Rectal four times a day PRN rectal irritation    PRESENT SYMPTOMS: [ ]Unable to obtain due to poor mentation   Source if other than patient:  [ ]Family   [ ]Team     Pain: [ ]yes [x ]no  QOL impact -   Location -                    Aggravating factors -  Quality -  Radiation -  Timing-  Severity (0-10 scale):  Minimal acceptable level (0-10 scale):     CPOT:    https://www.[email or web address].org/getattachment/[email or web address]/Critical-Care-Pain-Observation-Tool-(CPOT)      PAIN AD Score:     http://geriatrictoolkit.[employer].edu/cog/painad.pdf (press ctrl +  left click to view)    Dyspnea:                           [x ]Mild [ ]Moderate [ ]Severe  Anxiety:                             [ ]Mild [ ]Moderate [ ]Severe  Fatigue:                             [x ]Mild [ ]Moderate [ ]Severe  Nausea:                             [ ]Mild [ ]Moderate [ ]Severe  Loss of appetite:              [ ]Mild [ ]Moderate [ ]Severe  Constipation:                    [ ]Mild [ ]Moderate [ ]Severe    Other Symptoms:  [x ]All other review of systems negative     Palliative Performance Status Version 2:      40-50   %    http://[email or web address]/files/news/[email or web address]_performance_scale_ppsv2.pdf    PHYSICAL EXAM:  Vital Signs Last 24 Hrs  T(C): 36.4 (06 Sep 2021 11:27), Max: 36.6 (06 Sep 2021 08:18)  T(F): 97.6 (06 Sep 2021 11:27), Max: 97.8 (06 Sep 2021 08:18)  HR: 77 (06 Sep 2021 11:27) (58 - 101)  BP: 105/68 (06 Sep 2021 11:27) (94/50 - 129/83)  BP(mean): --  RR: 18 (06 Sep 2021 11:27) (18 - 18)  SpO2: 100% (06 Sep 2021 11:27) (98% - 100%) I&O's Summary    05 Sep 2021 07:01  -  06 Sep 2021 07:00  --------------------------------------------------------  IN: 970 mL / OUT: 2000 mL / NET: -1030 mL    06 Sep 2021 07:01  -  06 Sep 2021 12:55  --------------------------------------------------------  IN: 120 mL / OUT: 0 mL / NET: 120 mL      GENERAL:  [ x]Alert  [ ]Oriented x   [ ]Lethargic  [ ]Cachexia  [ ]Unarousable  [x ]Verbal  [ ]Non-Verbal  Behavioral:   [ ] Anxiety  [ ] Delirium [ ] Agitation [ ] Other  HEENT:  [x ]Normal   [ ]Dry mouth   [ ]ET Tube/Trach  [ ]Oral lesions  PULMONARY:   [x ]Clear [ ]Tachypnea  [ ]Audible excessive secretions   [ ]Rhonchi        [ ]Right [ ]Left [ ]Bilateral  [ ]Crackles        [ ]Right [ ]Left [ ]Bilateral  [ ]Wheezing     [ ]Right [ ]Left [ ]Bilateral  [ ]Diminished breath sounds [ ]right [ ]left [ ]bilateral  CARDIOVASCULAR:    [x ]Regular [ ]Irregular [ ]Tachy  [ ]Juan A [ ]Murmur [ ]Other  GASTROINTESTINAL:  [x ]Soft  [ ]Distended   [ ]+BS  [ ]Non tender [ ]Tender  [ ]PEG [ ]OGT/ NGT  Last BM:   GENITOURINARY:  [ ]Normal [ ] Incontinent   [ ]Oliguria/Anuria   [x ]Esquivel  MUSCULOSKELETAL:   [ ]Normal   [ ]Weakness  [ ]Bed/Wheelchair bound [ x]Edema- positional- left arm   NEUROLOGIC:   [x ]No focal deficits  [ ]Cognitive impairment  [ ]Dysphagia [ ]Dysarthria [ ]Paresis [ ]Other   SKIN:   [x ]Normal    [ ]Rash  [ ]Pressure ulcer(s)       Present on admission [ ]y [ ]n    CRITICAL CARE:  [ ] Shock Present  [ ]Septic [ ]Cardiogenic [ ]Neurologic [ ]Hypovolemic  [ ]  Vasopressors [ ]  Inotropes   [ ]Respiratory failure present [ ]Mechanical ventilation [ ]Non-invasive ventilatory support [ ]High flow    [ ]Acute  [ ]Chronic [ ]Hypoxic  [ ]Hypercarbic [ ]Other  [ ]Other organ failure     LABS:                        9.4    10.19 )-----------( 164      ( 06 Sep 2021 07:14 )             32.4   09-06    140  |  90<L>  |  67<H>  ----------------------------<  104<H>  3.6   |  37<H>  |  0.97    Ca    8.8      06 Sep 2021 07:14  Phos  3.4     09-06  Mg     2.0     09-06    TPro  5.8<L>  /  Alb  3.3  /  TBili  1.0  /  DBili  x   /  AST  22  /  ALT  41  /  AlkPhos  174<H>  09-05    RADIOLOGY & ADDITIONAL STUDIES:  CXR  IMPRESSION:  Left cardiac device again noted. right PIC line noted tip overlying junction of SVC/right atrial region. No pneumothorax.    Bilateral pleural effusion/opacities without significant change    Heart size cannot be accurately assessed in this projection, but appear enlarged.    PROTEIN CALORIE MALNUTRITION PRESENT: [ ]mild [ ]moderate [ ]severe [ ]underweight [ ]morbid obesity  https://www.andeal.org/vault/2440/web/files/ONC/Table_Clinical%20Characteristics%20to%20Document%20Malnutrition-White%20JV%20et%20al%202012.pdf    Height (cm): 160 (08-26-21 @ 14:57)  Weight (kg): 52.8 (08-29-21 @ 20:15), 45.4 (08-26-21 @ 14:57)  BMI (kg/m2): 20.6 (08-29-21 @ 20:15), 17.7 (08-26-21 @ 14:57)    [ ]PPSV2 < or = to 30% [ ]significant weight loss  [ ]poor nutritional intake  [ ]anasarca      [ ]Artificial Nutrition      REFERRALS:   [ ]Chaplaincy  [ ]Hospice  [ ]Child Life  [ ]Social Work  [ ]Case management [ ]Holistic Therapy     Goals of Care Document:

## 2021-09-16 NOTE — CONSULT NOTE ADULT - REASON FOR ADMISSION
85F transferred from [facility] to [facility] for acute heart failure w/ severe Mitral Regurgitation
85F transferred from [facility] to [facility] for acute heart failure w/ severe Mitral Regurgitation
85F transferred from [facility] to [facility] for acute heart failure w/ severe Mitral Regurgitation
85F transferred from [facility] to [facility] for acute heart failure w/ severe Mitral Regurgitation

## 2021-09-16 NOTE — PROGRESS NOTE ADULT - SUBJECTIVE AND OBJECTIVE BOX
DATE OF SERVICE:  09/16/2021  Patient was seen and examined on 09/16/2021    .Interim events noted.Consultant notes ,Labs,Telemetry reviewed by me    PRESENTING CC:    HPI and HOSPITAL COURSE:  85F [place] Islander-speaking w/ hx of severe MV regurgitation, HFrEF s/p AICD, chronic afib, DM2, HTN, hyperthyroidism presented initially to [facility] with acute heart failure now transferred to [facility] for further management. The patient was admitted initially to [facility] 8/26 with symptoms of sob and lower extremity swelling in setting of delaying mitral clip at [facility] reportedly planned 1mo prior. She required dobutamine for IV furosemide diuresis, completed TTE demonstrating severe MR, TR, pulmHTN, and grossly low normal left ventricular systolic function. She additionally was identified to have dysphagia and completed speech and swallow which identify need for dysphagia diet. Wound care RN additionally identified and were treating decubitus wounds which were present prior to hospitalization. Was treated with Dobutrex assisted diuresis   Is off IV diuretics       INTERIM EVENTS:Awake afebrile started on IV Rocephin-Urine C&S >100K GNR      PMH -reviewed admission note, no change since admission  Heart Failure: Acute [ ] Chronic [ ] Acute on Chronic [x ] Diastolic [x ] Systolic [ ] Combined Systolic and Diastolic[ ]  JUANY[ ]  ATN[ ]  CKD I [ ] CKDII [ ] CKD III [ ] CKD IV [ ] CKD V [ ] ESRD[ ]  HTN[ ] CVA[ ] DM[ ] COPD[ ] COVID[ ] AF[x ]  PPM[ ] ICD[x ]    MEDICATIONS  (STANDING):  apixaban 2.5 milliGRAM(s) Oral two times a day  ascorbic acid 500 milliGRAM(s) Oral daily  carvedilol 3.125 milliGRAM(s) Oral every 12 hours  cefTRIAXone   IVPB 1000 milliGRAM(s) IV Intermittent every 24 hours  chlorhexidine 4% Liquid 1 Application(s) Topical daily  digoxin     Tablet 125 MICROGram(s) Oral daily  furosemide    Tablet 40 milliGRAM(s) Oral daily  glucagon  Injectable 1 milliGRAM(s) IntraMuscular once  insulin lispro (ADMELOG) corrective regimen sliding scale   SubCutaneous three times a day before meals  insulin lispro (ADMELOG) corrective regimen sliding scale   SubCutaneous at bedtime  multivitamin 1 Tablet(s) Oral daily  nystatin Powder 1 Application(s) Topical two times a day  pantoprazole    Tablet 40 milliGRAM(s) Oral before breakfast  polyethylene glycol 3350 17 Gram(s) Oral daily  sacubitril 24 mG/valsartan 26 mG 1 Tablet(s) Oral two times a day  senna 2 Tablet(s) Oral at bedtime  spironolactone 25 milliGRAM(s) Oral daily    MEDICATIONS  (PRN):  acetaminophen   Tablet .. 650 milliGRAM(s) Oral every 6 hours PRN Temp greater or equal to 38C (100.4F), Mild Pain (1 - 3)  benzocaine 15 mG/menthol 3.6 mG (Sugar-Free) Lozenge 1 Lozenge Oral every 2 hours PRN Sore Throat  haloperidol    Injectable 0.25 milliGRAM(s) IV Push every 8 hours PRN Agitation  hemorrhoidal Ointment 1 Application(s) Rectal four times a day PRN rectal irritation            REVIEW OF SYSTEMS:  Constitutional: [ ] fever, [ ]weight loss,  [x ]fatigue  Eyes: [ ] visual changes  Respiratory: [ ]shortness of breath;  [ ] cough, [ ]wheezing, [ ]chills, [ ]hemoptysis  Cardiovascular: [ ] chest pain, [ ]palpitations, [ ]dizziness,  [ ]leg swelling[ ]orthopnea[ ]PND  Gastrointestinal: [ ] abdominal pain, [ ]nausea, [ ]vomiting,  [ ]diarrhea [ ]Constipation [ ]Melena  Genitourinary: [ ] dysuria, [ ] hematuria [ ]Esquivel  Neurologic: [ ] headaches [ ] tremors[ ]weakness [ ]Paralysis Right[ ] Left[ ]  Skin: [ ] itching, [ ]burning, [ ] rashes  Endocrine: [ ] heat or cold intolerance  Musculoskeletal: [ ] joint pain or swelling; [ ] muscle, back, or extremity pain  Psychiatric: [ ] depression, [ ]anxiety, [ ]mood swings, or [ ]difficulty sleeping  Hematologic: [ ] easy bruising, [ ] bleeding gums    [x] All remaining systems negative except as per above.   [ ]Unable to obtain.    Vital Signs Last 24 Hrs  T(C): 36.9 (16 Sep 2021 04:00), Max: 36.9 (16 Sep 2021 04:00)  T(F): 98.4 (16 Sep 2021 04:00), Max: 98.4 (16 Sep 2021 04:00)  HR: 70 (16 Sep 2021 04:00) (68 - 87)  BP: 132/72 (16 Sep 2021 04:00) (92/62 - 132/72)  RR: 17 (16 Sep 2021 04:00) (17 - 18)  SpO2: 98% (16 Sep 2021 04:00) (94% - 98%)  I&O's Summary    14 Sep 2021 07:01  -  15 Sep 2021 07:00  --------------------------------------------------------  IN: 980 mL / OUT: 850 mL / NET: 130 mL    15 Sep 2021 07:01  -  16 Sep 2021 06:12  --------------------------------------------------------  IN: 1270 mL / OUT: 650 mL / NET: 620 mL        PHYSICAL EXAM:  General: No acute distress BMI-26  HEENT: EOMI, PERRL  Neck: Supple, [ ] JVD  Lungs: Equal air entry bilaterally; [ ] rales [ ] wheezing [ ] rhonchi  Heart: Regular rate and rhythm; [x ] murmur  2 /6 [x ] systolic [ ] diastolic [ ] radiation[ ] rubs [ ]  gallops  Abdomen: Nontender, bowel sounds present  Extremities: No clubbing, cyanosis, [ ] edema [ ]Pulses  equal and intact  Nervous system:  Alert & Oriented X2, no focal deficits  Psychiatric: Normal affect  Skin: No rashes or lesions    LABS:  09-15    144  |  98  |  48<H>  ----------------------------<  128<H>  4.0   |  35<H>  |  0.95    Ca    9.3      15 Sep 2021 06:40      Creatinine Trend: 0.95<--, 0.82<--, 0.81<--, 0.82<--, 1.11<--, 0.89<--                        9.3    10.50 )-----------( 204      ( 15 Sep 2021 06:40 )             32.7           ECHO:Study Date: 8/27/2021  CONCLUSIONS:  1. Moderate to severe, eccentric postetriorly directed mitral regurgitation.  2.  Mild aortic regurgitation.  3. Severely dilated left atrium.  LA volume index = 95 cc/m2.  4. Normal left ventricular internal dimensions and wall thicknesses.  5. Endocardium not well visualized; grossly low normal left ventricular systolic function.   Segmental wallmotion could not be assessed.EF 55%  6. Unable to asses diastolic function due to technical aspects of this study.  7. Normal right ventricular size and function.   A device lead is visualized in the right heart.  8. RV systolic pressure is 62 mm Hg. Severe pulmonary hypertension.  9. There is severe tricuspid regurgitation.      IMPRESSION AND PLAN:  85 YO F with with a history of ACC/AHA Stage C NICM with improved LVEF (LV 4.9 cm, LVEF 40-45%) s/p CRT-D, severe mitral regurgitation being considered for MitraClip at [facility], chronic AF/AFl, and DM2 who was admitted to [facility] with ADHF and transferred to [facility] for consideration of MitraClip    Problem/Plan - 1:  ·  Problem: Acute decompensated heart failure.   ·  Plan: - GDMT: continue losartan 25 mg daily and spironolactone 25 mg daily. Will start BB when euvolemic and uptitrate losartan as tolerates   - Device: s/p CRT with 92% BiV pacing  -Discussed GOC with tanner Shaw explained Ms Davis overall frail condition,albeit improved LV EF her condition and prognosis is poor-considering discharge to Home with Home care.  -On Furosemide PO  .[place] and Entresto  -Tolerating Coreg  -Awaiting discharge to Home  -On IV Rocephin   Problem/Plan - 2:  ·  Problem: Severe mitral valve regurgitation.   ·  Plan: - Structural cardiology consulted for symptomatic severe MR, though in discussions with the team - the anatomic limitations of her mitral valve plus significant frailty would make her a poor candidate for any intervention.    Problem/Plan - 3:  ·  Problem: Chronic atrial fibrillation.   ·  Plan: - continue digoxin and Eliquis  -Resumed Eliquis      Problem/Plan - 4:  ·  Problem: Transaminitis.   ·  Plan: - due to congestion and resolved with diuresis.    Problem/Plan - 5:  ·  Problem: JUANY (acute kidney injury).   Resolved

## 2021-09-17 LAB
ANION GAP SERPL CALC-SCNC: 10 MMOL/L — SIGNIFICANT CHANGE UP (ref 5–17)
BUN SERPL-MCNC: 62 MG/DL — HIGH (ref 7–23)
CALCIUM SERPL-MCNC: 9 MG/DL — SIGNIFICANT CHANGE UP (ref 8.4–10.5)
CHLORIDE SERPL-SCNC: 95 MMOL/L — LOW (ref 96–108)
CO2 SERPL-SCNC: 35 MMOL/L — HIGH (ref 22–31)
CREAT SERPL-MCNC: 1.07 MG/DL — SIGNIFICANT CHANGE UP (ref 0.5–1.3)
GLUCOSE BLDC GLUCOMTR-MCNC: 129 MG/DL — HIGH (ref 70–99)
GLUCOSE BLDC GLUCOMTR-MCNC: 143 MG/DL — HIGH (ref 70–99)
GLUCOSE BLDC GLUCOMTR-MCNC: 153 MG/DL — HIGH (ref 70–99)
GLUCOSE BLDC GLUCOMTR-MCNC: 166 MG/DL — HIGH (ref 70–99)
GLUCOSE SERPL-MCNC: 142 MG/DL — HIGH (ref 70–99)
HCT VFR BLD CALC: 30.7 % — LOW (ref 34.5–45)
HGB BLD-MCNC: 8.7 G/DL — LOW (ref 11.5–15.5)
MCHC RBC-ENTMCNC: 22.1 PG — LOW (ref 27–34)
MCHC RBC-ENTMCNC: 28.3 GM/DL — LOW (ref 32–36)
MCV RBC AUTO: 78.1 FL — LOW (ref 80–100)
NRBC # BLD: 0 /100 WBCS — SIGNIFICANT CHANGE UP (ref 0–0)
PLATELET # BLD AUTO: 202 K/UL — SIGNIFICANT CHANGE UP (ref 150–400)
POTASSIUM SERPL-MCNC: 4.2 MMOL/L — SIGNIFICANT CHANGE UP (ref 3.5–5.3)
POTASSIUM SERPL-SCNC: 4.2 MMOL/L — SIGNIFICANT CHANGE UP (ref 3.5–5.3)
RBC # BLD: 3.93 M/UL — SIGNIFICANT CHANGE UP (ref 3.8–5.2)
RBC # FLD: 18.2 % — HIGH (ref 10.3–14.5)
SODIUM SERPL-SCNC: 140 MMOL/L — SIGNIFICANT CHANGE UP (ref 135–145)
WBC # BLD: 9.88 K/UL — SIGNIFICANT CHANGE UP (ref 3.8–10.5)
WBC # FLD AUTO: 9.88 K/UL — SIGNIFICANT CHANGE UP (ref 3.8–10.5)

## 2021-09-17 PROCEDURE — 99232 SBSQ HOSP IP/OBS MODERATE 35: CPT

## 2021-09-17 RX ORDER — ACETAMINOPHEN 500 MG
2 TABLET ORAL
Qty: 0 | Refills: 0 | DISCHARGE

## 2021-09-17 RX ORDER — MEROPENEM 1 G/30ML
1000 INJECTION INTRAVENOUS EVERY 8 HOURS
Refills: 0 | Status: DISCONTINUED | OUTPATIENT
Start: 2021-09-17 | End: 2021-09-17

## 2021-09-17 RX ORDER — DOBUTAMINE HCL 250MG/20ML
0 VIAL (ML) INTRAVENOUS
Qty: 0 | Refills: 0 | DISCHARGE

## 2021-09-17 RX ORDER — FUROSEMIDE 40 MG
40 TABLET ORAL
Qty: 0 | Refills: 0 | DISCHARGE

## 2021-09-17 RX ORDER — SIMETHICONE 80 MG/1
80 TABLET, CHEWABLE ORAL ONCE
Refills: 0 | Status: DISCONTINUED | OUTPATIENT
Start: 2021-09-17 | End: 2021-09-17

## 2021-09-17 RX ORDER — SENNA PLUS 8.6 MG/1
2 TABLET ORAL
Qty: 0 | Refills: 0 | DISCHARGE
Start: 2021-09-17

## 2021-09-17 RX ORDER — ENOXAPARIN SODIUM 100 MG/ML
50 INJECTION SUBCUTANEOUS
Qty: 0 | Refills: 0 | DISCHARGE

## 2021-09-17 RX ORDER — APIXABAN 2.5 MG/1
1 TABLET, FILM COATED ORAL
Qty: 60 | Refills: 0
Start: 2021-09-17 | End: 2021-10-16

## 2021-09-17 RX ORDER — BENZOCAINE AND MENTHOL 5; 1 G/100ML; G/100ML
1 LIQUID ORAL
Qty: 0 | Refills: 0 | DISCHARGE

## 2021-09-17 RX ORDER — CARVEDILOL PHOSPHATE 80 MG/1
1 CAPSULE, EXTENDED RELEASE ORAL
Qty: 60 | Refills: 0
Start: 2021-09-17 | End: 2021-10-16

## 2021-09-17 RX ORDER — FUROSEMIDE 40 MG
1 TABLET ORAL
Qty: 30 | Refills: 0
Start: 2021-09-17 | End: 2021-10-16

## 2021-09-17 RX ORDER — MEROPENEM 1 G/30ML
INJECTION INTRAVENOUS
Refills: 0 | Status: DISCONTINUED | OUTPATIENT
Start: 2021-09-17 | End: 2021-09-17

## 2021-09-17 RX ORDER — MEROPENEM 1 G/30ML
1000 INJECTION INTRAVENOUS ONCE
Refills: 0 | Status: COMPLETED | OUTPATIENT
Start: 2021-09-17 | End: 2021-09-17

## 2021-09-17 RX ORDER — DIGOXIN 250 MCG
1 TABLET ORAL
Qty: 30 | Refills: 0
Start: 2021-09-17 | End: 2021-10-16

## 2021-09-17 RX ORDER — NITROFURANTOIN MACROCRYSTAL 50 MG
100 CAPSULE ORAL
Refills: 0 | Status: DISCONTINUED | OUTPATIENT
Start: 2021-09-17 | End: 2021-09-20

## 2021-09-17 RX ORDER — SACUBITRIL AND VALSARTAN 24; 26 MG/1; MG/1
1 TABLET, FILM COATED ORAL
Qty: 60 | Refills: 0
Start: 2021-09-17 | End: 2021-10-16

## 2021-09-17 RX ORDER — NITROFURANTOIN MACROCRYSTAL 50 MG
1 CAPSULE ORAL
Qty: 14 | Refills: 0
Start: 2021-09-17 | End: 2021-09-23

## 2021-09-17 RX ORDER — NYSTATIN CREAM 100000 [USP'U]/G
1 CREAM TOPICAL
Qty: 0 | Refills: 0 | DISCHARGE
Start: 2021-09-17

## 2021-09-17 RX ORDER — ASCORBIC ACID 60 MG
1 TABLET,CHEWABLE ORAL
Qty: 30 | Refills: 0
Start: 2021-09-17 | End: 2021-10-16

## 2021-09-17 RX ORDER — DIGOXIN 250 MCG
100 TABLET ORAL
Qty: 0 | Refills: 0 | DISCHARGE

## 2021-09-17 RX ORDER — POLYETHYLENE GLYCOL 3350 17 G/17G
17 POWDER, FOR SOLUTION ORAL
Qty: 0 | Refills: 0 | DISCHARGE
Start: 2021-09-17

## 2021-09-17 RX ORDER — MINERAL OIL, PETROLATUM, PHENYLEPHRINE HCL 2.5; 140; 749 MG/G; MG/G; MG/G
1 OINTMENT TOPICAL
Qty: 1 | Refills: 0
Start: 2021-09-17 | End: 2021-10-16

## 2021-09-17 RX ORDER — SPIRONOLACTONE 25 MG/1
1 TABLET, FILM COATED ORAL
Qty: 30 | Refills: 0
Start: 2021-09-17 | End: 2021-10-16

## 2021-09-17 RX ADMIN — PANTOPRAZOLE SODIUM 40 MILLIGRAM(S): 20 TABLET, DELAYED RELEASE ORAL at 05:47

## 2021-09-17 RX ADMIN — Medication 1: at 17:49

## 2021-09-17 RX ADMIN — MEROPENEM 100 MILLIGRAM(S): 1 INJECTION INTRAVENOUS at 06:57

## 2021-09-17 RX ADMIN — Medication 500 MILLIGRAM(S): at 12:04

## 2021-09-17 RX ADMIN — Medication 125 MICROGRAM(S): at 05:47

## 2021-09-17 RX ADMIN — SACUBITRIL AND VALSARTAN 1 TABLET(S): 24; 26 TABLET, FILM COATED ORAL at 05:46

## 2021-09-17 RX ADMIN — NYSTATIN CREAM 1 APPLICATION(S): 100000 CREAM TOPICAL at 19:08

## 2021-09-17 RX ADMIN — SENNA PLUS 2 TABLET(S): 8.6 TABLET ORAL at 21:34

## 2021-09-17 RX ADMIN — Medication 1: at 08:12

## 2021-09-17 RX ADMIN — Medication 40 MILLIGRAM(S): at 05:47

## 2021-09-17 RX ADMIN — Medication 100 MILLIGRAM(S): at 19:08

## 2021-09-17 RX ADMIN — Medication 1 TABLET(S): at 12:04

## 2021-09-17 RX ADMIN — CARVEDILOL PHOSPHATE 3.12 MILLIGRAM(S): 80 CAPSULE, EXTENDED RELEASE ORAL at 19:08

## 2021-09-17 RX ADMIN — NYSTATIN CREAM 1 APPLICATION(S): 100000 CREAM TOPICAL at 05:47

## 2021-09-17 RX ADMIN — SACUBITRIL AND VALSARTAN 1 TABLET(S): 24; 26 TABLET, FILM COATED ORAL at 19:08

## 2021-09-17 RX ADMIN — SPIRONOLACTONE 25 MILLIGRAM(S): 25 TABLET, FILM COATED ORAL at 05:47

## 2021-09-17 RX ADMIN — CHLORHEXIDINE GLUCONATE 1 APPLICATION(S): 213 SOLUTION TOPICAL at 13:14

## 2021-09-17 RX ADMIN — Medication 650 MILLIGRAM(S): at 12:30

## 2021-09-17 RX ADMIN — APIXABAN 2.5 MILLIGRAM(S): 2.5 TABLET, FILM COATED ORAL at 05:47

## 2021-09-17 RX ADMIN — Medication 650 MILLIGRAM(S): at 14:11

## 2021-09-17 RX ADMIN — APIXABAN 2.5 MILLIGRAM(S): 2.5 TABLET, FILM COATED ORAL at 19:07

## 2021-09-17 RX ADMIN — POLYETHYLENE GLYCOL 3350 17 GRAM(S): 17 POWDER, FOR SOLUTION ORAL at 13:15

## 2021-09-17 NOTE — PROGRESS NOTE ADULT - SUBJECTIVE AND OBJECTIVE BOX
86y old  Female who presents with a chief complaint of 85F transferred from [facility] to [facility] for acute heart failure w/ severe Mitral Regurgitation (17 Sep 2021 06:37)      Interval history:  Afebrile, denies chest pain, wants battery replaced for her hearing aid.       Allergies:   No Known Allergies      Antimicrobials:  nitrofurantoin monohydrate/macrocrystals (MACROBID) 100 milliGRAM(s) Oral two times a day      REVIEW OF SYSTEMS:  No chest pain   No SOB  No N/V  No rash.       Vital Signs Last 24 Hrs  T(C): 36.4 (09-17-21 @ 11:57), Max: 36.7 (09-17-21 @ 00:32)  T(F): 97.5 (09-17-21 @ 11:57), Max: 98.1 (09-17-21 @ 00:32)  HR: 80 (09-17-21 @ 11:57) (68 - 87)  BP: 114/74 (09-17-21 @ 11:57) (100/63 - 117/66)  BP(mean): --  RR: 18 (09-17-21 @ 11:57) (17 - 18)  SpO2: 96% (09-17-21 @ 11:57) (94% - 97%)      PHYSICAL EXAM:  Patient in no acute distress. Alert, awake.   Cardiovascular: S1S2 normal.  Lungs: + air entry B/L lung fields.  Gastrointestinal: soft, nondistended.  Extremities: no edema.  IV sites not inflamed.                             8.7    9.88  )-----------( 202      ( 17 Sep 2021 06:59 )             30.7   09-17    140  |  95<L>  |  62<H>  ----------------------------<  142<H>  4.2   |  35<H>  |  1.07    Ca    9.0      17 Sep 2021 06:59      Specimen Source: Clean Catch Clean Catch (Midstream)   Culture Results:   >100,000 CFU/ml Escherichia coli ESBL

## 2021-09-17 NOTE — PROGRESS NOTE ADULT - ASSESSMENT
85F Maori-speaking w/ hx of severe MV regurgitation, HFrEF s/p AICD, chronic afib, DM2, HTN, hyperthyroidism presented initially to [facility] with acute heart failure, transferred to [facility] for further management.    Overall leucocytosis, abnormal U/A with symptoms.  + urine cx concerning for UTI   ESBL E coli on urine cx,   given its just cystitis can treat with macrobid.     Plan:   stopped ceftriaxone, switch to macrobid, plan for 7 days of therapy.       Plan discussed with Medicine NP    Will sign off, please call with questions.     Keenan Larsen  Pager: [phone]. If no response or past 5 pm call [phone].

## 2021-09-17 NOTE — DISCHARGE NOTE NURSING/CASE MANAGEMENT/SOCIAL WORK - NSDPLANG ASISDEC_GEN_ALL_CORE
Launch [employer] and print the 'Prescriptions from this Visit' Report
Patient/Caregiver Requests Family/Friend To Interpret

## 2021-09-17 NOTE — PROGRESS NOTE ADULT - SUBJECTIVE AND OBJECTIVE BOX
DATE OF SERVICE:  2021    pt seen and examined    MEDICATIONS  (STANDING):  apixaban 2.5 milliGRAM(s) Oral two times a day  ascorbic acid 500 milliGRAM(s) Oral daily  carvedilol 3.125 milliGRAM(s) Oral every 12 hours  cefTRIAXone   IVPB 1000 milliGRAM(s) IV Intermittent every 24 hours  chlorhexidine 4% Liquid 1 Application(s) Topical daily  dextrose 40% Gel 15 Gram(s) Oral once  dextrose 5%. 1000 milliLiter(s) (50 mL/Hr) IV Continuous <Continuous>  dextrose 5%. 1000 milliLiter(s) (100 mL/Hr) IV Continuous <Continuous>  dextrose 50% Injectable 25 Gram(s) IV Push once  dextrose 50% Injectable 12.5 Gram(s) IV Push once  dextrose 50% Injectable 25 Gram(s) IV Push once  digoxin     Tablet 125 MICROGram(s) Oral daily  furosemide    Tablet 40 milliGRAM(s) Oral daily  glucagon  Injectable 1 milliGRAM(s) IntraMuscular once  insulin lispro (ADMELOG) corrective regimen sliding scale   SubCutaneous three times a day before meals  insulin lispro (ADMELOG) corrective regimen sliding scale   SubCutaneous at bedtime  multivitamin 1 Tablet(s) Oral daily  nystatin Powder 1 Application(s) Topical two times a day  pantoprazole    Tablet 40 milliGRAM(s) Oral before breakfast  polyethylene glycol 3350 17 Gram(s) Oral daily  sacubitril 24 mG/valsartan 26 mG 1 Tablet(s) Oral two times a day  senna 2 Tablet(s) Oral at bedtime  spironolactone 25 milliGRAM(s) Oral daily    MEDICATIONS  (PRN):  acetaminophen   Tablet .. 650 milliGRAM(s) Oral every 6 hours PRN Temp greater or equal to 38C (100.4F), Mild Pain (1 - 3)  benzocaine 15 mG/menthol 3.6 mG (Sugar-Free) Lozenge 1 Lozenge Oral every 2 hours PRN Sore Throat  haloperidol    Injectable 0.25 milliGRAM(s) IV Push every 8 hours PRN Agitation  hemorrhoidal Ointment 1 Application(s) Rectal four times a day PRN rectal irritation    Vital Signs Last 24 Hrs  T(C): 36.5 (21 @ 20:13), Max: 36.9 (21 @ 04:00)  T(F): 97.7 (21 @ 20:13), Max: 98.4 (21 @ 04:00)  HR: 70 (21 @ 20:13) (70 - 78)  BP: 100/63 (21 @ 20:13) (90/48 - 132/72)  BP(mean): --  RR: 18 (21 @ 20:13) (17 - 18)  SpO2: 97% (21 @ 20:13) (94% - 98%)      Lungs: dec breath sounds at bases  Heart: Regular rate and rhythm;  Abdomen: soft , bs+  Extremities: edema+  Nervous system:  Alert awake  Psychiatric: Normal affect  Skin: No rashes or lesions    LABS:      139  |  94<L>  |  50<H>  ----------------------------<  122<H>  3.7   |  34<H>  |  0.86    Ca    9.1      16 Sep 2021 07:23      Creatinine Trend: 0.86 <--, 0.95 <--, 0.82 <--, 0.81 <--, 0.82 <--, 1.11 <--                        9.0    10.49 )-----------( 219      ( 16 Sep 2021 07:23 )             32.1     Urine Studies:  Urinalysis Basic - ( 14 Sep 2021 03:59 )    Color: Yellow / Appearance: Turbid / S.015 / pH:   Gluc:  / Ketone: Negative  / Bili: Negative / Urobili: 2 mg/dL   Blood:  / Protein: 30 mg/dL / Nitrite: Negative   Leuk Esterase: Large / RBC: 6 /hpf /  /HPF   Sq Epi:  / Non Sq Epi: 3 /hpf / Bacteria: Many                    IMPRESSION AND PLAN:  85 YO F with with a history of ACC/AHA Stage C NICM with improved LVEF (LV 4.9 cm, LVEF 40-45%) s/p CRT-D, severe mitral regurgitation being considered for MitraClip at [facility], chronic AF/AFl, and DM2 who was admitted to [facility] with ADHF and transferred to [facility] for consideration of MitraClip    Problem/Plan - 1:  ·  Problem: Acute decompensated heart failure.   ·  Plan: - GDMT: continue losartan 25 mg daily and spironolactone 25 mg daily. Will start BB when euvolemic and uptitrate losartan as tolerates   - Device: s/p CRT with 92% BiV pacing  -Discussed GOC with tanner Sahw explained Ms Ryan overall frail condition,albeit improved LV EF her condition and prognosis is poor-considering discharge to Home with Home care.  -On Furosemide PO  .Andrew and Entresto  -Tolerating Coreg  -Awaiting discharge to SNF    Problem/Plan - 2:  ·  Problem: Severe mitral valve regurgitation.   ·  Plan: - Structural cardiology consulted for symptomatic severe MR, though in discussions with the team - the anatomic limitations of her mitral valve plus significant frailty would make her a poor candidate for any intervention.    Problem/Plan - 3:  ·  Problem: Chronic atrial fibrillation.   ·  Plan: - continue digoxin and systemic a/c, on therapeutic lovenox per cardiology. digoxin level normal.  -Resumed Eliquis      Problem/Plan - 4:  ·  Problem: Transaminitis.   ·  Plan: - due to congestion and resolved with diuresis.    Problem/Plan - 5:  ·  Problem: JUANY (acute kidney injury).     started on abx for uti ,  ID f/u

## 2021-09-17 NOTE — DISCHARGE NOTE NURSING/CASE MANAGEMENT/SOCIAL WORK - PATIENT PORTAL LINK FT
You can access the FollowMyHealth Patient Portal offered by [facility] by registering at the following website: [email or web address]. By joining [email or web address]’s FollowMyHealth portal, you will also be able to view your health information using other applications (apps) compatible with our system.

## 2021-09-17 NOTE — DISCHARGE NOTE NURSING/CASE MANAGEMENT/SOCIAL WORK - NSPROEXTENSIONSOFSELF_GEN_A_NUR
hearing aid none/hearing aid

## 2021-09-17 NOTE — PROGRESS NOTE ADULT - SUBJECTIVE AND OBJECTIVE BOX
DATE OF SERVICE:  2021    pt seen and examined    MEDICATIONS  (STANDING):  apixaban 2.5 milliGRAM(s) Oral two times a day  ascorbic acid 500 milliGRAM(s) Oral daily  carvedilol 3.125 milliGRAM(s) Oral every 12 hours  chlorhexidine 4% Liquid 1 Application(s) Topical daily  dextrose 40% Gel 15 Gram(s) Oral once  dextrose 5%. 1000 milliLiter(s) (50 mL/Hr) IV Continuous <Continuous>  dextrose 5%. 1000 milliLiter(s) (100 mL/Hr) IV Continuous <Continuous>  dextrose 50% Injectable 25 Gram(s) IV Push once  dextrose 50% Injectable 25 Gram(s) IV Push once  dextrose 50% Injectable 12.5 Gram(s) IV Push once  digoxin     Tablet 125 MICROGram(s) Oral daily  furosemide    Tablet 40 milliGRAM(s) Oral daily  glucagon  Injectable 1 milliGRAM(s) IntraMuscular once  insulin lispro (ADMELOG) corrective regimen sliding scale   SubCutaneous three times a day before meals  insulin lispro (ADMELOG) corrective regimen sliding scale   SubCutaneous at bedtime  multivitamin 1 Tablet(s) Oral daily  nitrofurantoin monohydrate/macrocrystals (MACROBID) 100 milliGRAM(s) Oral two times a day  nystatin Powder 1 Application(s) Topical two times a day  pantoprazole    Tablet 40 milliGRAM(s) Oral before breakfast  polyethylene glycol 3350 17 Gram(s) Oral daily  sacubitril 24 mG/valsartan 26 mG 1 Tablet(s) Oral two times a day  senna 2 Tablet(s) Oral at bedtime  spironolactone 25 milliGRAM(s) Oral daily    MEDICATIONS  (PRN):  acetaminophen   Tablet .. 650 milliGRAM(s) Oral every 6 hours PRN Temp greater or equal to 38C (100.4F), Mild Pain (1 - 3)  benzocaine 15 mG/menthol 3.6 mG (Sugar-Free) Lozenge 1 Lozenge Oral every 2 hours PRN Sore Throat  haloperidol    Injectable 0.25 milliGRAM(s) IV Push every 8 hours PRN Agitation  hemorrhoidal Ointment 1 Application(s) Rectal four times a day PRN rectal irritation    Vital Signs Last 24 Hrs  T(C): 36.4 (21 @ 20:23), Max: 37.1 (21 @ 04:50)  T(F): 97.6 (21 @ 20:23), Max: 98.7 (21 @ 04:50)  HR: 81 (21 @ 20:23) (61 - 81)  BP: 137/66 (21 @ 20:23) (107/65 - 137/66)  BP(mean): --  RR: 18 (21 @ 20:23) (18 - 18)  SpO2: 95% (21 @ 20:23) (94% - 97%)      Lungs: dec breath sounds at bases  Heart: Regular rate and rhythm;  Abdomen: soft , bs+  Extremities: edema+  Nervous system:  Alert awake  Psychiatric: Normal affect  Skin: No rashes or lesions    LABS:      140  |  95<L>  |  62<H>  ----------------------------<  142<H>  4.2   |  35<H>  |  1.07    Ca    9.0      17 Sep 2021 06:59      Creatinine Trend: 1.07 <--, 0.86 <--, 0.95 <--, 0.82 <--, 0.81 <--                        8.7    9.88  )-----------( 202      ( 17 Sep 2021 06:59 )             30.7     Urine Studies:  Urinalysis Basic - ( 14 Sep 2021 03:59 )    Color: Yellow / Appearance: Turbid / S.015 / pH:   Gluc:  / Ketone: Negative  / Bili: Negative / Urobili: 2 mg/dL   Blood:  / Protein: 30 mg/dL / Nitrite: Negative   Leuk Esterase: Large / RBC: 6 /hpf /  /HPF   Sq Epi:  / Non Sq Epi: 3 /hpf / Bacteria: Many                                      IMPRESSION AND PLAN:  87 YO F with with a history of ACC/AHA Stage C NICM with improved LVEF (LV 4.9 cm, LVEF 40-45%) s/p CRT-D, severe mitral regurgitation being considered for MitraClip at [facility], chronic AF/AFl, and DM2 who was admitted to [facility] with ADHF and transferred to [facility] for consideration of MitraClip    Problem/Plan - 1:  ·  Problem: Acute decompensated heart failure.   ·  Plan: - GDMT: continue losartan 25 mg daily and spironolactone 25 mg daily. Will start BB when euvolemic and uptitrate losartan as tolerates   - Device: s/p CRT with 92% BiV pacing  -Discussed GOC with tanner Shaw explained Ms Ryan overall frail condition,albeit improved LV EF her condition and prognosis is poor-considering discharge to Home with Home care.  -On Furosemide PO  .Andrew and Entresto  -Tolerating Coreg  -Awaiting discharge to SNF    Problem/Plan - 2:  ·  Problem: Severe mitral valve regurgitation.   ·  Plan: - Structural cardiology consulted for symptomatic severe MR, though in discussions with the team - the anatomic limitations of her mitral valve plus significant frailty would make her a poor candidate for any intervention.    Problem/Plan - 3:  ·  Problem: Chronic atrial fibrillation.   ·  Plan: - continue digoxin and systemic a/c, on therapeutic lovenox per cardiology. digoxin level normal.  -Resumed Eliquis      Problem/Plan - 4:  ·  Problem: Transaminitis.   ·  Plan: - due to congestion and resolved with diuresis.    Problem/Plan - 5:  ·  Problem: JUANY (acute kidney injury).     started on abx for uti ,  ID f/u

## 2021-09-17 NOTE — DISCHARGE NOTE NURSING/CASE MANAGEMENT/SOCIAL WORK - NSDCFUADDAPPT_GEN_ALL_CORE_FT
APPTS ARE READY TO BE MADE: [ x] YES    Best Family or Patient Contact (if needed):  Megan niece: [phone] - HCP  Additional Information about above appointments (if needed):    1:   2:   3:     Other comments or requests:

## 2021-09-18 LAB
GLUCOSE BLDC GLUCOMTR-MCNC: 125 MG/DL — HIGH (ref 70–99)
GLUCOSE BLDC GLUCOMTR-MCNC: 131 MG/DL — HIGH (ref 70–99)
GLUCOSE BLDC GLUCOMTR-MCNC: 143 MG/DL — HIGH (ref 70–99)
GLUCOSE BLDC GLUCOMTR-MCNC: 193 MG/DL — HIGH (ref 70–99)

## 2021-09-18 RX ADMIN — Medication 40 MILLIGRAM(S): at 04:52

## 2021-09-18 RX ADMIN — NYSTATIN CREAM 1 APPLICATION(S): 100000 CREAM TOPICAL at 05:00

## 2021-09-18 RX ADMIN — CHLORHEXIDINE GLUCONATE 1 APPLICATION(S): 213 SOLUTION TOPICAL at 12:19

## 2021-09-18 RX ADMIN — Medication 650 MILLIGRAM(S): at 05:16

## 2021-09-18 RX ADMIN — APIXABAN 2.5 MILLIGRAM(S): 2.5 TABLET, FILM COATED ORAL at 04:52

## 2021-09-18 RX ADMIN — SACUBITRIL AND VALSARTAN 1 TABLET(S): 24; 26 TABLET, FILM COATED ORAL at 17:16

## 2021-09-18 RX ADMIN — SACUBITRIL AND VALSARTAN 1 TABLET(S): 24; 26 TABLET, FILM COATED ORAL at 04:53

## 2021-09-18 RX ADMIN — CARVEDILOL PHOSPHATE 3.12 MILLIGRAM(S): 80 CAPSULE, EXTENDED RELEASE ORAL at 04:51

## 2021-09-18 RX ADMIN — Medication 125 MICROGRAM(S): at 04:52

## 2021-09-18 RX ADMIN — PANTOPRAZOLE SODIUM 40 MILLIGRAM(S): 20 TABLET, DELAYED RELEASE ORAL at 04:52

## 2021-09-18 RX ADMIN — Medication 100 MILLIGRAM(S): at 04:52

## 2021-09-18 RX ADMIN — Medication 650 MILLIGRAM(S): at 04:46

## 2021-09-18 RX ADMIN — APIXABAN 2.5 MILLIGRAM(S): 2.5 TABLET, FILM COATED ORAL at 17:17

## 2021-09-18 RX ADMIN — Medication 100 MILLIGRAM(S): at 17:17

## 2021-09-18 RX ADMIN — SENNA PLUS 2 TABLET(S): 8.6 TABLET ORAL at 23:03

## 2021-09-18 RX ADMIN — Medication 500 MILLIGRAM(S): at 11:42

## 2021-09-18 RX ADMIN — Medication 1 TABLET(S): at 11:42

## 2021-09-18 RX ADMIN — SPIRONOLACTONE 25 MILLIGRAM(S): 25 TABLET, FILM COATED ORAL at 04:57

## 2021-09-18 RX ADMIN — NYSTATIN CREAM 1 APPLICATION(S): 100000 CREAM TOPICAL at 17:17

## 2021-09-18 RX ADMIN — POLYETHYLENE GLYCOL 3350 17 GRAM(S): 17 POWDER, FOR SOLUTION ORAL at 12:09

## 2021-09-18 NOTE — PROGRESS NOTE ADULT - SUBJECTIVE AND OBJECTIVE BOX
DATE OF SERVICE:  2021    pt seen and examined    MEDICATIONS  (STANDING):  apixaban 2.5 milliGRAM(s) Oral two times a day  ascorbic acid 500 milliGRAM(s) Oral daily  carvedilol 3.125 milliGRAM(s) Oral every 12 hours  chlorhexidine 4% Liquid 1 Application(s) Topical daily  dextrose 40% Gel 15 Gram(s) Oral once  dextrose 5%. 1000 milliLiter(s) (50 mL/Hr) IV Continuous <Continuous>  dextrose 5%. 1000 milliLiter(s) (100 mL/Hr) IV Continuous <Continuous>  dextrose 50% Injectable 25 Gram(s) IV Push once  dextrose 50% Injectable 12.5 Gram(s) IV Push once  dextrose 50% Injectable 25 Gram(s) IV Push once  digoxin     Tablet 125 MICROGram(s) Oral daily  furosemide    Tablet 40 milliGRAM(s) Oral daily  glucagon  Injectable 1 milliGRAM(s) IntraMuscular once  insulin lispro (ADMELOG) corrective regimen sliding scale   SubCutaneous at bedtime  insulin lispro (ADMELOG) corrective regimen sliding scale   SubCutaneous three times a day before meals  multivitamin 1 Tablet(s) Oral daily  nitrofurantoin monohydrate/macrocrystals (MACROBID) 100 milliGRAM(s) Oral two times a day  nystatin Powder 1 Application(s) Topical two times a day  pantoprazole    Tablet 40 milliGRAM(s) Oral before breakfast  polyethylene glycol 3350 17 Gram(s) Oral daily  sacubitril 24 mG/valsartan 26 mG 1 Tablet(s) Oral two times a day  senna 2 Tablet(s) Oral at bedtime  spironolactone 25 milliGRAM(s) Oral daily    MEDICATIONS  (PRN):  acetaminophen   Tablet .. 650 milliGRAM(s) Oral every 6 hours PRN Temp greater or equal to 38C (100.4F), Mild Pain (1 - 3)  benzocaine 15 mG/menthol 3.6 mG (Sugar-Free) Lozenge 1 Lozenge Oral every 2 hours PRN Sore Throat  haloperidol    Injectable 0.25 milliGRAM(s) IV Push every 8 hours PRN Agitation  hemorrhoidal Ointment 1 Application(s) Rectal four times a day PRN rectal irritation    Vital Signs Last 24 Hrs  T(C): 36.4 (21 @ 20:23), Max: 37.1 (21 @ 04:50)  T(F): 97.6 (21 @ 20:23), Max: 98.7 (21 @ 04:50)  HR: 81 (21 @ 20:23) (61 - 81)  BP: 137/66 (21 @ 20:23) (107/65 - 137/66)  BP(mean): --  RR: 18 (21 @ 20:23) (18 - 18)  SpO2: 95% (21 @ 20:23) (94% - 97%)        Lungs: dec breath sounds at bases  Heart: Regular rate and rhythm;  Abdomen: soft , bs+  Extremities: edema+  Nervous system:  Alert awake  Psychiatric: Normal affect  Skin: No rashes or lesions    LABS:      140  |  95<L>  |  62<H>  ----------------------------<  142<H>  4.2   |  35<H>  |  1.07    Ca    9.0      17 Sep 2021 06:59      Creatinine Trend: 1.07 <--, 0.86 <--, 0.95 <--, 0.82 <--, 0.81 <--                        8.7    9.88  )-----------( 202      ( 17 Sep 2021 06:59 )             30.7     Urine Studies:  Urinalysis Basic - ( 14 Sep 2021 03:59 )    Color: Yellow / Appearance: Turbid / S.015 / pH:   Gluc:  / Ketone: Negative  / Bili: Negative / Urobili: 2 mg/dL   Blood:  / Protein: 30 mg/dL / Nitrite: Negative   Leuk Esterase: Large / RBC: 6 /hpf /  /HPF   Sq Epi:  / Non Sq Epi: 3 /hpf / Bacteria: Many                                IMPRESSION AND PLAN:  87 YO F with with a history of ACC/AHA Stage C NICM with improved LVEF (LV 4.9 cm, LVEF 40-45%) s/p CRT-D, severe mitral regurgitation being considered for MitraClip at [facility], chronic AF/AFl, and DM2 who was admitted to [facility] with ADHF and transferred to [facility] for consideration of MitraClip    Problem/Plan - 1:  ·  Problem: Acute decompensated heart failure.   ·  Plan: - GDMT: continue losartan 25 mg daily and spironolactone 25 mg daily. Will start BB when euvolemic and uptitrate losartan as tolerates   - Device: s/p CRT with 92% BiV pacing  -Discussed GOC with tanner Shaw explained Ms Ryan overall frail condition,albeit improved LV EF her condition and prognosis is poor-considering discharge to Home with Home care.  -On Furosemide PO  .Andrew and Entresto  -Tolerating Coreg  -Awaiting discharge to SNF    Problem/Plan - 2:  ·  Problem: Severe mitral valve regurgitation.   ·  Plan: - Structural cardiology consulted for symptomatic severe MR, though in discussions with the team - the anatomic limitations of her mitral valve plus significant frailty would make her a poor candidate for any intervention.    Problem/Plan - 3:  ·  Problem: Chronic atrial fibrillation.   ·  Plan: - continue digoxin and systemic a/c, on therapeutic lovenox per cardiology. digoxin level normal.  -Resumed Eliquis      Problem/Plan - 4:  ·  Problem: Transaminitis.   ·  Plan: - due to congestion and resolved with diuresis.    Problem/Plan - 5:  ·  Problem: JUANY (acute kidney injury).     started on abx for uti ,  ID f/u

## 2021-09-18 NOTE — PROGRESS NOTE ADULT - SUBJECTIVE AND OBJECTIVE BOX
DATE OF SERVICE:  09/18/2021  Patient was seen and examined on 09/18/2021    .Interim events noted.Consultant notes ,Labs,Telemetry reviewed by me    PRESENTING CC:Dyspnea    HPI and HOSPITAL COURSE: 85F Mosotho-speaking w/ hx of severe MV regurgitation, HFrEF s/p AICD, chronic afib, DM2, HTN, hyperthyroidism presented initially to [facility] with acute heart failure now transferred to [facility] for further management. The patient was admitted initially to [facility] 8/26 with symptoms of sob and lower extremity swelling in setting of delaying mitral clip at [facility] reportedly planned 1mo prior. She required dobutamine for IV furosemide diuresis, completed TTE demonstrating severe MR, TR, pulmHTN, and grossly low normal left ventricular systolic function.       INTERIM EVENTS:Awake not dyspneac afebrile      PMH -reviewed admission note, no change since admission  Heart Failure: Acute [ ] Chronic [ ] Acute on Chronic [ x] Diastolic [x ] Systolic [ ] Combined Systolic and Diastolic[ ]  JUANY[ ]  ATN[ ]  CKD I [ ] CKDII [ ] CKD III [ ] CKD IV [ ] CKD V [ ] ESRD[x ]  HTN[ ] CVA[ ] DM[ ] COPD[ ] COVID[ ] AF[x ]  PPM[ ] ICD[x ]    MEDICATIONS  (STANDING):  apixaban 2.5 milliGRAM(s) Oral two times a day  ascorbic acid 500 milliGRAM(s) Oral daily  carvedilol 3.125 milliGRAM(s) Oral every 12 hours  chlorhexidine 4% Liquid 1 Application(s) Topical daily  digoxin     Tablet 125 MICROGram(s) Oral daily  furosemide    Tablet 40 milliGRAM(s) Oral daily  glucagon  Injectable 1 milliGRAM(s) IntraMuscular once  insulin lispro (ADMELOG) corrective regimen sliding scale   SubCutaneous three times a day before meals  insulin lispro (ADMELOG) corrective regimen sliding scale   SubCutaneous at bedtime  multivitamin 1 Tablet(s) Oral daily  nitrofurantoin monohydrate/macrocrystals (MACROBID) 100 milliGRAM(s) Oral two times a day  nystatin Powder 1 Application(s) Topical two times a day  pantoprazole    Tablet 40 milliGRAM(s) Oral before breakfast  polyethylene glycol 3350 17 Gram(s) Oral daily  sacubitril 24 mG/valsartan 26 mG 1 Tablet(s) Oral two times a day  senna 2 Tablet(s) Oral at bedtime  spironolactone 25 milliGRAM(s) Oral daily    MEDICATIONS  (PRN):  acetaminophen   Tablet .. 650 milliGRAM(s) Oral every 6 hours PRN Temp greater or equal to 38C (100.4F), Mild Pain (1 - 3)  benzocaine 15 mG/menthol 3.6 mG (Sugar-Free) Lozenge 1 Lozenge Oral every 2 hours PRN Sore Throat  haloperidol    Injectable 0.25 milliGRAM(s) IV Push every 8 hours PRN Agitation  hemorrhoidal Ointment 1 Application(s) Rectal four times a day PRN rectal irritation            REVIEW OF SYSTEMS:  Constitutional: [ ] fever, [ ]weight loss,  [x ]fatigue  Eyes: [ ] visual changes  Respiratory: [x ]shortness of breath;  [ ] cough, [ ]wheezing, [ ]chills, [ ]hemoptysis  Cardiovascular: [ ] chest pain, [ ]palpitations, [ ]dizziness,  [ ]leg swelling[ ]orthopnea[ ]PND  Gastrointestinal: [ ] abdominal pain, [ ]nausea, [ ]vomiting,  [ ]diarrhea [ ]Constipation [ ]Melena  Genitourinary: [ ] dysuria, [ ] hematuria [ ]Esquivel  Neurologic: [ ] headaches [ ] tremors[ ]weakness [ ]Paralysis Right[ ] Left[ ]  Skin: [ ] itching, [ ]burning, [ ] rashes  Endocrine: [ ] heat or cold intolerance  Musculoskeletal: [ ] joint pain or swelling; [ ] muscle, back, or extremity pain  Psychiatric: [ ] depression, [ ]anxiety, [ ]mood swings, or [ ]difficulty sleeping  Hematologic: [ ] easy bruising, [ ] bleeding gums    [x] All remaining systems negative except as per above.   [ ]Unable to obtain.    Vital Signs Last 24 Hrs  T(C): 37.1 (18 Sep 2021 04:50), Max: 37.1 (18 Sep 2021 04:50)  T(F): 98.7 (18 Sep 2021 04:50), Max: 98.7 (18 Sep 2021 04:50)  HR: 76 (18 Sep 2021 04:50) (70 - 80)  BP: 127/80 (18 Sep 2021 04:50) (105/70 - 127/80)  RR: 18 (18 Sep 2021 04:50) (18 - 18)  SpO2: 96% (18 Sep 2021 04:50) (96% - 96%)  I&O's Summary    17 Sep 2021 07:01  -  18 Sep 2021 07:00  --------------------------------------------------------  IN: 0 mL / OUT: 700 mL / NET: -700 mL        PHYSICAL EXAM:  General: No acute distress BMI-25  HEENT: EOMI, PERRL  Neck: Supple, [ ] JVD  Lungs: Equal air entry bilaterally; [ ] rales [ ] wheezing [ ] rhonchi  Heart: Regular rate and rhythm; [x ] murmur  2 /6 [x ] systolic [ ] diastolic [ ] radiation[ ] rubs [ ]  gallops  Abdomen: Nontender, bowel sounds present  Extremities: No clubbing, cyanosis, [ ] edema [ ]Pulses  equal and intact  Nervous system:  Alert & Oriented X2, no focal deficits  Psychiatric: Normal affect  Skin: No rashes or lesions    LABS:  09-17    140  |  95<L>  |  62<H>  ----------------------------<  142<H>  4.2   |  35<H>  |  1.07    Ca    9.0      17 Sep 2021 06:59      Creatinine Trend: 1.07<--, 0.86<--, 0.95<--, 0.82<--, 0.81<--, 0.82<--                        8.7    9.88  )-----------( 202      ( 17 Sep 2021 06:59 )             30.7          Culture Results: >100,000 CFU/ml Escherichia coli ESBL     ECHO:Study Date: 8/27/2021  CONCLUSIONS:  1. Moderate to severe, eccentric postetriorly directed mitral regurgitation.  2.  Mild aortic regurgitation.  3. Severely dilated left atrium.  LA volume index = 95 cc/m2.  4. Normal left ventricular internal dimensions and wall thicknesses.  5. Endocardium not well visualized; grossly low normal left ventricular systolic function.   Segmental wallmotion could not be assessed.EF 55%  6. Unable to asses diastolic function due to technical aspects of this study.  7. Normal right ventricular size and function.   A device lead is visualized in the right heart.  8. RV systolic pressure is 62 mm Hg. Severe pulmonary hypertension.  9. There is severe tricuspid regurgitation.      IMPRESSION AND PLAN:  87 YO F with with a history of ACC/AHA Stage C NICM with improved LVEF (LV 4.9 cm, LVEF 40-45%) s/p CRT-D, severe mitral regurgitation being considered for [facility]raClip at [facility], chronic AF/AFl, and DM2 who was admitted to [facility] with ADHF and transferred to [facility] for consideration of MitraClip    Problem/Plan - 1:  ·  Problem: Acute decompensated heart failure.   ·  Plan: - GDMT: continue losartan 25 mg daily and spironolactone 25 mg daily. Will start BB when euvolemic and uptitrate losartan as tolerates   - Device: s/p CRT with 92% BiV pacing  -Discussed GOC with tanner Shaw explained Ms Davis overall frail condition,albeit improved LV EF her condition and prognosis is poor-considering discharge to Home with Home care.  -On Furosemide PO  .[place] and Entresto  -Tolerating Coreg  -Awaiting discharge to Home  -On IV Rocephin -Urine C&S-ESBL started on Macrobid      Problem/Plan - 2:  ·  Problem: Severe mitral valve regurgitation.   ·  Plan: - Structural cardiology consulted for symptomatic severe MR, though in discussions with the team - the anatomic limitations of her mitral valve plus significant frailty would make her a poor candidate for any intervention.    Problem/Plan - 3:  ·  Problem: Chronic atrial fibrillation.   ·  Plan: - continue digoxin and Eliquis  -Resumed Eliquis      Problem/Plan - 4:  ·  Problem: Transaminitis.   ·  Plan: - due to congestion and resolved with diuresis.    Problem/Plan - 5:  ·  Problem: JUANY (acute kidney injury).   Resolved

## 2021-09-19 LAB
GLUCOSE BLDC GLUCOMTR-MCNC: 128 MG/DL — HIGH (ref 70–99)
GLUCOSE BLDC GLUCOMTR-MCNC: 146 MG/DL — HIGH (ref 70–99)
GLUCOSE BLDC GLUCOMTR-MCNC: 153 MG/DL — HIGH (ref 70–99)
GLUCOSE BLDC GLUCOMTR-MCNC: 180 MG/DL — HIGH (ref 70–99)

## 2021-09-19 RX ADMIN — Medication 100 MILLIGRAM(S): at 18:55

## 2021-09-19 RX ADMIN — CARVEDILOL PHOSPHATE 3.12 MILLIGRAM(S): 80 CAPSULE, EXTENDED RELEASE ORAL at 05:29

## 2021-09-19 RX ADMIN — NYSTATIN CREAM 1 APPLICATION(S): 100000 CREAM TOPICAL at 05:31

## 2021-09-19 RX ADMIN — Medication 1: at 17:01

## 2021-09-19 RX ADMIN — Medication 125 MICROGRAM(S): at 05:30

## 2021-09-19 RX ADMIN — Medication 1: at 12:33

## 2021-09-19 RX ADMIN — Medication 1 TABLET(S): at 12:34

## 2021-09-19 RX ADMIN — NYSTATIN CREAM 1 APPLICATION(S): 100000 CREAM TOPICAL at 17:02

## 2021-09-19 RX ADMIN — CHLORHEXIDINE GLUCONATE 1 APPLICATION(S): 213 SOLUTION TOPICAL at 12:24

## 2021-09-19 RX ADMIN — APIXABAN 2.5 MILLIGRAM(S): 2.5 TABLET, FILM COATED ORAL at 18:50

## 2021-09-19 RX ADMIN — PANTOPRAZOLE SODIUM 40 MILLIGRAM(S): 20 TABLET, DELAYED RELEASE ORAL at 05:30

## 2021-09-19 RX ADMIN — Medication 100 MILLIGRAM(S): at 05:30

## 2021-09-19 RX ADMIN — SPIRONOLACTONE 25 MILLIGRAM(S): 25 TABLET, FILM COATED ORAL at 05:30

## 2021-09-19 RX ADMIN — SACUBITRIL AND VALSARTAN 1 TABLET(S): 24; 26 TABLET, FILM COATED ORAL at 18:46

## 2021-09-19 RX ADMIN — SENNA PLUS 2 TABLET(S): 8.6 TABLET ORAL at 21:34

## 2021-09-19 RX ADMIN — POLYETHYLENE GLYCOL 3350 17 GRAM(S): 17 POWDER, FOR SOLUTION ORAL at 12:34

## 2021-09-19 RX ADMIN — Medication 500 MILLIGRAM(S): at 12:34

## 2021-09-19 RX ADMIN — SACUBITRIL AND VALSARTAN 1 TABLET(S): 24; 26 TABLET, FILM COATED ORAL at 05:30

## 2021-09-19 RX ADMIN — Medication 40 MILLIGRAM(S): at 05:30

## 2021-09-19 RX ADMIN — APIXABAN 2.5 MILLIGRAM(S): 2.5 TABLET, FILM COATED ORAL at 05:30

## 2021-09-19 NOTE — PROGRESS NOTE ADULT - SUBJECTIVE AND OBJECTIVE BOX
DATE OF SERVICE:  09/19/2021  Patient was seen and examined on 09/19/2021    .Interim events noted.Consultant notes ,Labs,Telemetry reviewed by me    PRESENTING CC:Dyspnea    HPI and HOSPITAL COURSE:85F Guyanese-speaking w/ hx of severe MV regurgitation, HFrEF s/p AICD, chronic afib, DM2, HTN, hyperthyroidism presented initially to [facility] with acute heart failure now transferred to [facility] for further management. The patient was admitted initially to [facility] 8/26 with symptoms of sob and lower extremity swelling in setting of delaying mitral clip at [facility] reportedly planned 1mo prior. She required dobutamine for IV furosemide diuresis, completed TTE demonstrating severe MR, TR, pulmHTN, and grossly low normal left ventricular systolic function  Had Dobutamine assisted diuresis followed by [facility] gtt-has been diuresed well      INTERIM EVENTS:Awake no orthopnea chest pain remains afebrile      PMH -reviewed admission note, no change since admission  Heart Failure: Acute [ ] Chronic [ ] Acute on Chronic [x ] Diastolic [x ] Systolic [ ] Combined Systolic and Diastolic[ ]  JUANY[ ]  ATN[ ]  CKD I [ ] CKDII [ ] CKD III [ ] CKD IV [ ] CKD V [ ] ESRD[ ]  HTN[ ] CVA[ ] DM[ ] COPD[ ] COVID[ ] AF[x ]  PPM[ ] ICD[x ]    MEDICATIONS  (STANDING):  apixaban 2.5 milliGRAM(s) Oral two times a day  ascorbic acid 500 milliGRAM(s) Oral daily  carvedilol 3.125 milliGRAM(s) Oral every 12 hours  chlorhexidine 4% Liquid 1 Application(s) Topical daily  digoxin     Tablet 125 MICROGram(s) Oral daily  furosemide    Tablet 40 milliGRAM(s) Oral daily  glucagon  Injectable 1 milliGRAM(s) IntraMuscular once  insulin lispro (ADMELOG) corrective regimen sliding scale   SubCutaneous at bedtime  insulin lispro (ADMELOG) corrective regimen sliding scale   SubCutaneous three times a day before meals  multivitamin 1 Tablet(s) Oral daily  nitrofurantoin monohydrate/macrocrystals (MACROBID) 100 milliGRAM(s) Oral two times a day  nystatin Powder 1 Application(s) Topical two times a day  pantoprazole    Tablet 40 milliGRAM(s) Oral before breakfast  polyethylene glycol 3350 17 Gram(s) Oral daily  sacubitril 24 mG/valsartan 26 mG 1 Tablet(s) Oral two times a day  senna 2 Tablet(s) Oral at bedtime  spironolactone 25 milliGRAM(s) Oral daily    MEDICATIONS  (PRN):  acetaminophen   Tablet .. 650 milliGRAM(s) Oral every 6 hours PRN Temp greater or equal to 38C (100.4F), Mild Pain (1 - 3)  benzocaine 15 mG/menthol 3.6 mG (Sugar-Free) Lozenge 1 Lozenge Oral every 2 hours PRN Sore Throat  haloperidol    Injectable 0.25 milliGRAM(s) IV Push every 8 hours PRN Agitation  hemorrhoidal Ointment 1 Application(s) Rectal four times a day PRN rectal irritation            REVIEW OF SYSTEMS:  Constitutional: [ ] fever, [ ]weight loss,  [x ]fatigue  Eyes: [ ] visual changes  Respiratory: [ ]shortness of breath;  [ ] cough, [ ]wheezing, [ ]chills, [ ]hemoptysis  Cardiovascular: [ ] chest pain, [ ]palpitations, [ ]dizziness,  [ ]leg swelling[ ]orthopnea[ ]PND  Gastrointestinal: [ ] abdominal pain, [ ]nausea, [ ]vomiting,  [ ]diarrhea [ ]Constipation [ ]Melena  Genitourinary: [ ] dysuria, [ ] hematuria [ ]Esquivel  Neurologic: [ ] headaches [ ] tremors[ ]weakness [ ]Paralysis Right[ ] Left[ ]  Skin: [ ] itching, [ ]burning, [ ] rashes  Endocrine: [ ] heat or cold intolerance  Musculoskeletal: [ ] joint pain or swelling; [ ] muscle, back, or extremity pain  Psychiatric: [ ] depression, [ ]anxiety, [ ]mood swings, or [ ]difficulty sleeping  Hematologic: [ ] easy bruising, [ ] bleeding gums    [x] All remaining systems negative except as per above.   [ ]Unable to obtain.    Vital Signs Last 24 Hrs  T(C): 36.7 (19 Sep 2021 04:40), Max: 36.7 (18 Sep 2021 11:45)  T(F): 98.1 (19 Sep 2021 04:40), Max: 98.1 (19 Sep 2021 04:40)  HR: 79 (19 Sep 2021 04:40) (61 - 81)  BP: 110/67 (19 Sep 2021 04:40) (107/65 - 137/66)  RR: 18 (19 Sep 2021 04:40) (18 - 18)  SpO2: 95% (19 Sep 2021 04:40) (94% - 97%)  I&O's Summary    18 Sep 2021 07:01  -  19 Sep 2021 07:00  --------------------------------------------------------  IN: 670 mL / OUT: 600 mL / NET: 70 mL        PHYSICAL EXAM:  General: No acute distress BMI-24  HEENT: EOMI, PERRL  Neck: Supple, [ ] JVD  Lungs: Equal air entry bilaterally; [ ] rales [ ] wheezing [ ] rhonchi  Heart: Regular rate and rhythm; [x ] murmur   2/6 [x ] systolic [ ] diastolic [ ] radiation[ ] rubs [ ]  gallops  Abdomen: Nontender, bowel sounds present  Extremities: No clubbing, cyanosis, [ ] edema [ ]Pulses  equal and intact  Nervous system:  Alert & Oriented X3, no focal deficits  Psychiatric: Normal affect  Skin: No rashes or lesions    LABS:        Creatinine Trend: 1.07<--, 0.86<--, 0.95<--, 0.82<--, 0.81<--, 0.82<--        IMPRESSION AND PLAN:  85 YO F with with a history of ACC/AHA Stage C NICM with improved LVEF (LV 4.9 cm, LVEF 40-45%) s/p CRT-D, severe mitral regurgitation being considered for MitraClip at [facility], chronic AF/AFl, and DM2 who was admitted to [facility] with ADHF and transferred to [facility] for consideration of MitraClip diuresed seen by Advanced HF and Structural Heart team not a candidate for intervention    Problem/Plan - 1:  ·  Problem: Acute decompensated heart failure.   ·  Plan: - GDMT: continue Entresto daily and spironolactone 25 mg daily. Started  BB   - Device: s/p CRT with 92% BiV pacing  -Discussed GOC with tanner Shaw explained Ms Davis overall frail condition,albeit improved LV EF her condition and prognosis is poor-considering discharge to Home with Home care.  -On Furosemide PO  .[place] and Entresto  -Tolerating Coreg  -Awaiting discharge to Home  -On IV Rocephin -Urine C&S-ESBL started on Macrobid  Possible discharge home tomorrow when A services reinstated      Problem/Plan - 2:  ·  Problem: Severe mitral valve regurgitation.   ·  Plan: - Structural cardiology consulted for symptomatic severe MR, though in discussions with the team - the anatomic limitations of her mitral valve plus significant frailty would make her a poor candidate for any intervention.    Problem/Plan - 3:  ·  Problem: Chronic atrial fibrillation.   ·  Plan: - continue digoxin and Eliquis  -Resumed Eliquis      Problem/Plan - 4:  ·  Problem: Transaminitis.   ·  Plan: - due to congestion and resolved with diuresis.    Problem/Plan - 5:  ·  Problem: JUANY (acute kidney injury).   Resolved

## 2021-09-20 VITALS
OXYGEN SATURATION: 97 % | TEMPERATURE: 98 F | SYSTOLIC BLOOD PRESSURE: 100 MMHG | HEART RATE: 64 BPM | RESPIRATION RATE: 18 BRPM | DIASTOLIC BLOOD PRESSURE: 60 MMHG

## 2021-09-20 LAB
GLUCOSE BLDC GLUCOMTR-MCNC: 149 MG/DL — HIGH (ref 70–99)
GLUCOSE BLDC GLUCOMTR-MCNC: 161 MG/DL — HIGH (ref 70–99)
GLUCOSE BLDC GLUCOMTR-MCNC: 171 MG/DL — HIGH (ref 70–99)
SARS-COV-2 RNA SPEC QL NAA+PROBE: SIGNIFICANT CHANGE UP

## 2021-09-20 PROCEDURE — 97110 THERAPEUTIC EXERCISES: CPT

## 2021-09-20 PROCEDURE — 83735 ASSAY OF MAGNESIUM: CPT

## 2021-09-20 PROCEDURE — 80048 BASIC METABOLIC PNL TOTAL CA: CPT

## 2021-09-20 PROCEDURE — 84100 ASSAY OF PHOSPHORUS: CPT

## 2021-09-20 PROCEDURE — 97162 PT EVAL MOD COMPLEX 30 MIN: CPT

## 2021-09-20 PROCEDURE — 71045 X-RAY EXAM CHEST 1 VIEW: CPT

## 2021-09-20 PROCEDURE — 87086 URINE CULTURE/COLONY COUNT: CPT

## 2021-09-20 PROCEDURE — 93306 TTE W/DOPPLER COMPLETE: CPT

## 2021-09-20 PROCEDURE — 93005 ELECTROCARDIOGRAM TRACING: CPT

## 2021-09-20 PROCEDURE — 85610 PROTHROMBIN TIME: CPT

## 2021-09-20 PROCEDURE — 81001 URINALYSIS AUTO W/SCOPE: CPT

## 2021-09-20 PROCEDURE — 82962 GLUCOSE BLOOD TEST: CPT

## 2021-09-20 PROCEDURE — 85027 COMPLETE CBC AUTOMATED: CPT

## 2021-09-20 PROCEDURE — 85730 THROMBOPLASTIN TIME PARTIAL: CPT

## 2021-09-20 PROCEDURE — 85025 COMPLETE CBC W/AUTO DIFF WBC: CPT

## 2021-09-20 PROCEDURE — U0003: CPT

## 2021-09-20 PROCEDURE — 80162 ASSAY OF DIGOXIN TOTAL: CPT

## 2021-09-20 PROCEDURE — 36569 INSJ PICC 5 YR+ W/O IMAGING: CPT

## 2021-09-20 PROCEDURE — 80076 HEPATIC FUNCTION PANEL: CPT

## 2021-09-20 PROCEDURE — 84443 ASSAY THYROID STIM HORMONE: CPT

## 2021-09-20 PROCEDURE — 94640 AIRWAY INHALATION TREATMENT: CPT

## 2021-09-20 PROCEDURE — C1751: CPT

## 2021-09-20 PROCEDURE — 87186 SC STD MICRODIL/AGAR DIL: CPT

## 2021-09-20 PROCEDURE — 86769 SARS-COV-2 COVID-19 ANTIBODY: CPT

## 2021-09-20 PROCEDURE — 83605 ASSAY OF LACTIC ACID: CPT

## 2021-09-20 PROCEDURE — 82140 ASSAY OF AMMONIA: CPT

## 2021-09-20 PROCEDURE — 97530 THERAPEUTIC ACTIVITIES: CPT

## 2021-09-20 PROCEDURE — 76705 ECHO EXAM OF ABDOMEN: CPT

## 2021-09-20 PROCEDURE — 97116 GAIT TRAINING THERAPY: CPT

## 2021-09-20 PROCEDURE — 80053 COMPREHEN METABOLIC PANEL: CPT

## 2021-09-20 PROCEDURE — U0005: CPT

## 2021-09-20 RX ADMIN — POLYETHYLENE GLYCOL 3350 17 GRAM(S): 17 POWDER, FOR SOLUTION ORAL at 12:40

## 2021-09-20 RX ADMIN — Medication 125 MICROGRAM(S): at 05:36

## 2021-09-20 RX ADMIN — Medication 1: at 08:07

## 2021-09-20 RX ADMIN — Medication 100 MILLIGRAM(S): at 05:36

## 2021-09-20 RX ADMIN — PANTOPRAZOLE SODIUM 40 MILLIGRAM(S): 20 TABLET, DELAYED RELEASE ORAL at 05:35

## 2021-09-20 RX ADMIN — CHLORHEXIDINE GLUCONATE 1 APPLICATION(S): 213 SOLUTION TOPICAL at 12:40

## 2021-09-20 RX ADMIN — SPIRONOLACTONE 25 MILLIGRAM(S): 25 TABLET, FILM COATED ORAL at 05:37

## 2021-09-20 RX ADMIN — Medication 500 MILLIGRAM(S): at 12:37

## 2021-09-20 RX ADMIN — NYSTATIN CREAM 1 APPLICATION(S): 100000 CREAM TOPICAL at 05:33

## 2021-09-20 RX ADMIN — APIXABAN 2.5 MILLIGRAM(S): 2.5 TABLET, FILM COATED ORAL at 05:36

## 2021-09-20 RX ADMIN — Medication 40 MILLIGRAM(S): at 05:36

## 2021-09-20 RX ADMIN — Medication 1 TABLET(S): at 12:39

## 2021-09-20 RX ADMIN — SACUBITRIL AND VALSARTAN 1 TABLET(S): 24; 26 TABLET, FILM COATED ORAL at 05:35

## 2021-09-20 NOTE — CHART NOTE - NSCHARTNOTESELECT_GEN_ALL_CORE
Event Note
IUC pain/Event Note
Medicine NP/Event Note
Event Note
Event Note
Nutrition Services
PICC LINE PLACEMENT **/Event Note
PMT on Tele/Event Note
Pt assessment/Event Note
WCT on tele/Event Note

## 2021-09-20 NOTE — PROGRESS NOTE ADULT - REASON FOR ADMISSION
85F transferred from [facility] to [facility] for acute heart failure w/ severe Mitral Regurgitation
85F transferred from [facility] to [facility] for acute heart failure w/ severe Mitral Regurgitation
85F transferred from [facility] to [facility] for acute heart failure w/ severe Mitral Regurgitation
85F transferred from [facility] to [facility] for acute heart failure w/ severe Mitral Regurgitation
85F transferred from [facility] to [facility] for acute heart failure w/ severe Mitral Regurgitation
85F transferred from [facility] to [facility] for acute heart failure w/ severe Mitral Regurgitation
85F transferred from [facility] to [facility] for acute heart failure w/ severe Mitral Regurgitation
85F transferred from [facility] to [facility] for acute heart failure w/ severe Mitral Regurgitation
85F transferred from [facility] to [facility] for acute heart failure w/ severe Mitral Regurgitation
85F transferred from [facility] to [facility] for acute heart failure w/ severe Mitral Regurgitation
85F transferred from [facility] to [facility] for acute heart failure w/ severe Mitral Regurgitation
85F transferred from [facility] to [facility] for acute heart failure w/ severe Mitral Regurgitation
85F transferred from [facility] to [facility] for acute heart failure w/ severe Mitral Regurgitation
85F transferred from [facility] to [facility] for acute heart failure w/ severe Mitral Regurgitation
85F transferred from [facility] to [facility] for acute heart failure w/ severe Mitral Regurgitation
85F transferred from [facility] to [facility] for acute heart failure w/ severe Mitral Regurgitation
85F transferred from [facility] to [facility] for acute heart failure w/ severe Mitral Regurgitation
85F transferred from [facility] to [facility] for acute heart failure w/ severe Mitral Regurgitation
85F transferred from [facility] to [facility] for acute heart failure w/ severe Mitral Regurgitation
85F transferred from [facility] to [facility] for acute heart failure w/ severe Mitral Regurgitation
85F transferred from [facility] to [facility] for acute heart failure w/ severe Mitral Regurgitation
85F transferred from [facility] to [facility] for acute heart failure w/ severe Mitral Regurgitation
85F transferred from [facility] to [facility] for acute heart failure w/ severe Mitral Regurgitation
85F transferred from [facility] to [facility] for acute heart failure w/ severe Mitral Regurgitation
85F transferred from [facility] to [facility] for acute heart failure w/ severe Mitral Regurgitation
85F transferred from [facility] to [facility] for acute heart failure w/ severe Mitral Regurgitation
85F transferred from [facility] to [facility] for acute heart failure w/ severe Mitral Regurgitation
85F transferred from [facility] to [facility] for acute heart failure w/ severe Mitral Regurgitation
85F transferred from [facility] to [facility] for acute heart failure w/ severe Mitral Regurgitation
85F transferred from [facility] to [facility] for acute heart failure w/ severe Mitral Regurgitation
85F transferred from [facility] to [facility] for acute heart failure w/ severe Mitral Regurgitation
85F transferred from [facility] to [facility] for acute heart failure w/ severe Mitral Regurgitation
85F transferred from [facility] to [facility] for acute heart failure w/ severe Mitral Regurgitation
85F [facility] failure w/ severe Mitral Regurgitation
85F transferred from [facility] to [facility] for acute heart failure w/ severe Mitral Regurgitation
85F [facility] for [facility] heart failure w/ severe Mitral Regurgitation
85F transferred from [facility] to [facility] for acute heart failure w/ severe Mitral Regurgitation

## 2021-09-20 NOTE — PROGRESS NOTE ADULT - SUBJECTIVE AND OBJECTIVE BOX
DATE OF SERVICE:  09/20/2021  Patient was seen and examined on   09/20/2021  .Interim events noted.Consultant notes ,Labs,Telemetry reviewed by me    PRESENTING CC:Dyspnea    HPI and HOSPITAL COURSE:   85F [place] Islander-speaking w/ hx of severe MV regurgitation, HFrEF s/p AICD, chronic afib, DM2, HTN, hyperthyroidism presented initially to [facility] with acute heart failure now transferred to [facility] for further management.   She required dobutamine for IV furosemide diuresis, completed TTE demonstrating severe MR, TR, pulmHTN, and grossly low normal left ventricular systolic function.   She additionally was identified to have dysphagia and completed speech and swallow which identify need for dysphagia diet.   Wound care RN additionally identified and were treating decubitus wounds which were present prior to hospitalization.  Diuresed well now on HF meds  Awaiting HHA services       INTERIM EVENTS:Awake afebrile not dyspneac      PMH -reviewed admission note, no change since admission  Heart Failure: Acute [ ] Chronic [ ] Acute on Chronic [x ] Diastolic [x ] Systolic [ ] Combined Systolic and Diastolic[ ]  JUANY[ ]  ATN[ ]  CKD I [ ] CKDII [ ] CKD III [ ] CKD IV [ ] CKD V [ ] ESRD[ ]  HTN[x ] CVA[ ] DM[ ] COPD[ ] COVID[ ] AF[x ]  PPM[ ] ICD[x ]    MEDICATIONS  (STANDING):  apixaban 2.5 milliGRAM(s) Oral two times a day  ascorbic acid 500 milliGRAM(s) Oral daily  carvedilol 3.125 milliGRAM(s) Oral every 12 hours  chlorhexidine 4% Liquid 1 Application(s) Topical daily  digoxin     Tablet 125 MICROGram(s) Oral daily  furosemide    Tablet 40 milliGRAM(s) Oral daily  glucagon  Injectable 1 milliGRAM(s) IntraMuscular once  insulin lispro (ADMELOG) corrective regimen sliding scale   SubCutaneous three times a day before meals  insulin lispro (ADMELOG) corrective regimen sliding scale   SubCutaneous at bedtime  multivitamin 1 Tablet(s) Oral daily  nitrofurantoin monohydrate/macrocrystals (MACROBID) 100 milliGRAM(s) Oral two times a day  nystatin Powder 1 Application(s) Topical two times a day  pantoprazole    Tablet 40 milliGRAM(s) Oral before breakfast  polyethylene glycol 3350 17 Gram(s) Oral daily  sacubitril 24 mG/valsartan 26 mG 1 Tablet(s) Oral two times a day  senna 2 Tablet(s) Oral at bedtime  spironolactone 25 milliGRAM(s) Oral daily    MEDICATIONS  (PRN):  acetaminophen   Tablet .. 650 milliGRAM(s) Oral every 6 hours PRN Temp greater or equal to 38C (100.4F), Mild Pain (1 - 3)  benzocaine 15 mG/menthol 3.6 mG (Sugar-Free) Lozenge 1 Lozenge Oral every 2 hours PRN Sore Throat  haloperidol    Injectable 0.25 milliGRAM(s) IV Push every 8 hours PRN Agitation  hemorrhoidal Ointment 1 Application(s) Rectal four times a day PRN rectal irritation            REVIEW OF SYSTEMS:  Constitutional: [ ] fever, [ ]weight loss,  [x ]fatigue  Eyes: [ ] visual changes  Respiratory: [ ]shortness of breath;  [ ] cough, [ ]wheezing, [ ]chills, [ ]hemoptysis  Cardiovascular: [ ] chest pain, [ ]palpitations, [ ]dizziness,  [ ]leg swelling[ ]orthopnea[ ]PND  Gastrointestinal: [ ] abdominal pain, [ ]nausea, [ ]vomiting,  [ ]diarrhea [ ]Constipation [ ]Melena  Genitourinary: [ ] dysuria, [ ] hematuria [ ]Esquivel  Neurologic: [ ] headaches [ ] tremors[ ]weakness [ ]Paralysis Right[ ] Left[ ]  Skin: [ ] itching, [ ]burning, [ ] rashes  Endocrine: [ ] heat or cold intolerance  Musculoskeletal: [ ] joint pain or swelling; [ ] muscle, back, or extremity pain  Psychiatric: [ ] depression, [ ]anxiety, [ ]mood swings, or [ ]difficulty sleeping  Hematologic: [ ] easy bruising, [ ] bleeding gums    [x] All remaining systems negative except as per above.   [ ]Unable to obtain.    Vital Signs Last 24 Hrs  T(C): 36.4 (20 Sep 2021 05:04), Max: 36.4 (20 Sep 2021 00:19)  T(F): 97.6 (20 Sep 2021 05:04), Max: 97.6 (20 Sep 2021 05:04)  HR: 77 (20 Sep 2021 05:04) (76 - 80)  BP: 113/75 (20 Sep 2021 05:04) (100/68 - 113/75)  RR: 17 (20 Sep 2021 05:04) (17 - 18)  SpO2: 98% (20 Sep 2021 05:04) (95% - 98%)  I&O's Summary    19 Sep 2021 07:01  -  20 Sep 2021 07:00  --------------------------------------------------------  IN: 0 mL / OUT: 650 mL / NET: -650 mL        PHYSICAL EXAM:  General: No acute distress BMI-26  HEENT: EOMI, PERRL  Neck: Supple, [ ] JVD  Lungs: Equal air entry bilaterally; [ ] rales [ ] wheezing [ ] rhonchi  Heart: Regular rate and rhythm; [x ] murmur   3/6 [x ] systolic [ ] diastolic [ ] radiation[ ] rubs [ ]  gallops  Abdomen: Nontender, bowel sounds present  Extremities: No clubbing, cyanosis, [ ] edema [ ]Pulses  equal and intact  Nervous system:  Alert & Oriented X3, no focal deficits  Psychiatric: Normal affect  Skin: No rashes or lesions    LABS:        Creatinine Trend: 1.07<--, 0.86<--, 0.95<--, 0.82<--, 0.81<--, 0.82<--            IMPRESSION AND PLAN:    Acute on Chronic Diastolic Heart Failure-HFiEF  Atrial Fibrillation  JUANY      87 YO F with with a history of ACC/AHA Stage C NICM with improved LVEF (LV 4.9 cm, LVEF 40-45%) s/p CRT-D, severe mitral regurgitation being considered for MitraClip at [facility], chronic AF/AFl, and DM2 who was admitted to [facility] with ADHF and transferred to [facility] for consideration of MitraClip.     She initially had JUANY and transaminitis and was initiated on dobutamine out of concern for decreased cardiac output.   Patient is nor s/p IV diuresis.   TTE on 9/9 when euvolemic and off inotropes reveals LVEF 40-45% (read as 60%) with continued severe eccentric MR.   She is tolerating higher doses of GDMT and has been diuresed 40 lbs this admission.   Due to severe MR but deemed poor candidate for intervention as well as advanced age and other comorbidities her prognosis is poor and risk of readmission is high for which palliative care has been consulted with plans to institute home care and consider transition to hospice in the future.     Stable for discharge from a HF perspective, will continue entresto, lasix, aldactone, coreg and she will follow continue up with me  as an outpatient.     .Pt found to have ESBl UTI for which ID recommending to complete 7 days of macrobid and ok for dispo from ID standpoint.     Transaminitis during this admission has now resolved with diuresis.     Pt will continue on eliquis for afib.     JUANY has now resolved.

## 2021-09-20 NOTE — PROGRESS NOTE ADULT - PROVIDER SPECIALTY LIST ADULT
Cardiology
Heart Failure
Internal Medicine
Internal Medicine
Cardiology
Internal Medicine
Structural Heart
Structural Heart
Cardiology
Heart Failure
Heart Failure
Infectious Disease
Internal Medicine
Internal Medicine
Cardiology
Heart Failure
Heart Failure
Internal Medicine
Internal Medicine
Structural Heart
Cardiology
Heart Failure
Cardiology
Palliative Care
Heart Failure
Palliative Care
Heart Failure
Palliative Care
Heart Failure

## 2021-09-20 NOTE — CHART NOTE - NSCHARTNOTEFT_GEN_A_CORE
Due to CHF, pt has severe mobility limitations and requires a transport wheelchair to assist in daily ADLs. Pt can not ambulate safely with a cane or walker. Pt does not have sufficient upper body strength to self propel a standard wheelchair. Pt has a caregiver to assist with maneuvering the wheelchair. Pt has not expressed an unwillingness to use the wheel chair. Pt has ample room in the home for said wheel chair. Use of a transport wheel chair will significantly improve the patient's ability to participate in daily ADL's and the patient will use it on a regular basis in the home.     Annabelle Veronica, HEATH  [id number]

## 2021-09-20 NOTE — PROGRESS NOTE ADULT - NUTRITIONAL ASSESSMENT
This patient has been assessed with a concern for Malnutrition and has been determined to have a diagnosis/diagnoses of Severe protein-calorie malnutrition and Underweight (BMI < 19).    This patient is being managed with:   Diet Dysphagia 2 Mechanical Soft-Thin Liquids-  Consistent Carbohydrate {Evening Snack} (CSTCHOSN)  Low Sodium  Don(7 Gm Arginine/7 Gm Glut/1.2 Gm HMB     Qty per Day:  2  Supplement Feeding Modality:  Oral  Glucerna Shake Cans or Servings Per Day:  1       Frequency:  Three Times a day  Entered: Sep  8 2021  4:26PM    

## 2021-09-20 NOTE — PROGRESS NOTE ADULT - TIME BILLING
- Review of records, telemetry, vital signs and daily labs.   - General and cardiovascular physical examination.  - Generation of cardiovascular treatment plan.  - Coordination of care.      Patient was seen and examined by me on 09/08/2021,interim events noted,labs and radiology studies reviewed.  Haile Granados MD,FACC.  [address] [place], [place] [id number].  [phone]
- Review of records, telemetry, vital signs and daily labs.   - General and cardiovascular physical examination.  - Generation of cardiovascular treatment plan.  - Coordination of care.      Patient was seen and examined by me on 09/17/2021,interim events noted,labs and radiology studies reviewed.  Haile Granados MD,FACC.  [address] [place], [place] [id number].  [phone]
- Review of records, telemetry, vital signs and daily labs.   - General and cardiovascular physical examination.  - Generation of cardiovascular treatment plan.  - Coordination of care.      Patient was seen and examined by me on  09/16/2021,interim events noted,labs and radiology studies reviewed.  Haile Granados MD,FACC.  [address] [place], [place] [id number].  [phone]
- Review of records, telemetry, vital signs and daily labs.   - General and cardiovascular physical examination.  - Generation of cardiovascular treatment plan.  - Coordination of care.      Patient was seen and examined by me on 08/31/2021,interim events noted,labs and radiology studies reviewed.  Haile Granados MD,FACC.  [address] [place], [place] [id number].  [phone]
- Review of records, telemetry, vital signs and daily labs.   - General and cardiovascular physical examination.  - Generation of cardiovascular treatment plan.  - Coordination of care.      Patient was seen and examined by me on 09/06/2021,interim events noted,labs and radiology studies reviewed.  Haile Granados MD,FACC.  [address] [place], [place] [id number].  [phone]
- Review of records, telemetry, vital signs and daily labs.   - General and cardiovascular physical examination.  - Generation of cardiovascular treatment plan.  - Coordination of care.      Patient was seen and examined by me on 09/18/2021,interim events noted,labs and radiology studies reviewed.  Haile Granados MD,FACC.  [address] [place], [place] [id number].  [phone]
- Review of records, telemetry, vital signs and daily labs.   - General and cardiovascular physical examination.  - Generation of cardiovascular treatment plan.  - Coordination of care.      Patient was seen and examined by me on 09/19/2021,interim events noted,labs and radiology studies reviewed.  Haile Granados MD,FACC.  [address] [place], [place] [id number].  [phone]
- Review of records, telemetry, vital signs and daily labs.   - General and cardiovascular physical examination.  - Generation of cardiovascular treatment plan.  - Coordination of care.      Patient was seen and examined by me on 09/12/2021,interim events noted,labs and radiology studies reviewed.  Haile Granados MD,FACC.  [address] [place], [place] [id number].  [phone]
- Review of records, telemetry, vital signs and daily labs.   - General and cardiovascular physical examination.  - Generation of cardiovascular treatment plan.  - Coordination of care.      Patient was seen and examined by me on 09/11/2021,interim events noted,labs and radiology studies reviewed.  Haile Granados MD,FACC.  [address] [place], [place] [id number].  [phone]
- Review of records, telemetry, vital signs and daily labs.   - General and cardiovascular physical examination.  - Generation of cardiovascular treatment plan.  - Coordination of care.      Patient was seen and examined by me on 09/20/2021,interim events noted,labs and radiology studies reviewed.  Haile Granados MD,FACC.  [address] [place], [place] [id number].  [phone]
- Review of records, telemetry, vital signs and daily labs.   - General and cardiovascular physical examination.  - Generation of cardiovascular treatment plan.  - Coordination of care.      Patient was seen and examined by me on 09/10/2021,interim events noted,labs and radiology studies reviewed.  Haile Granados MD,FACC.  [address] [place], [place] [id number].  [phone]
assessment, education and coordination of care.

## 2021-10-04 RX ORDER — BUMETANIDE 0.25 MG/ML
1 INJECTION INTRAMUSCULAR; INTRAVENOUS
Qty: 30 | Refills: 0
Start: 2021-10-04 | End: 2021-11-02

## 2022-04-08 NOTE — PROGRESS NOTE ADULT - SUBJECTIVE AND OBJECTIVE BOX
DATE OF SERVICE:  09/17/2021  Patient was seen and examined on  09/17/2021     .Interim events noted.Consultant notes ,Labs,Telemetry reviewed by me    PRESENTING CC:Dyspnea    HPI and HOSPITAL COURSE:  85F Sam[place]-speaking w/ hx of severe MV regurgitation, HFrEF s/p AICD, chronic afib, DM2, HTN, hyperthyroidism presented initially to [facility] with acute heart failure now transferred to [facility] for further management. The patient was admitted initially to [facility] 8/26 with symptoms of sob and lower extremity swelling in setting of delaying mitral clip at [facility] reportedly planned 1mo prior. She required dobutamine for IV furosemide diuresis, completed TTE demonstrating severe MR, TR, pulmHTN, and grossly low normal left ventricular systolic function.       INTERIM EVENTS:Awake responsive afebrile still c/o suprapubic pain      PMH -reviewed admission note, no change since admission  Heart Failure: Acute [ ] Chronic [ ] Acute on Chronic [x ] Diastolic [x ] Systolic [ ] Combined Systolic and Diastolic[ ]  JUANY[ ]  ATN[ ]  CKD I [ ] CKDII [ ] CKD III [ ] CKD IV [ ] CKD V [ ] ESRD[ ]  HTN[ ] CVA[ ] DM[ ] COPD[ ] COVID[ ] AF[x ]  PPM[ ] ICD[x ]    MEDICATIONS  (STANDING):  apixaban 2.5 milliGRAM(s) Oral two times a day  ascorbic acid 500 milliGRAM(s) Oral daily  carvedilol 3.125 milliGRAM(s) Oral every 12 hours  cefTRIAXone   IVPB 1000 milliGRAM(s) IV Intermittent every 24 hours  chlorhexidine 4% Liquid 1 Application(s) Topical daily  digoxin     Tablet 125 MICROGram(s) Oral daily  furosemide    Tablet 40 milliGRAM(s) Oral daily  glucagon  Injectable 1 milliGRAM(s) IntraMuscular once  insulin lispro (ADMELOG) corrective regimen sliding scale   SubCutaneous three times a day before meals  insulin lispro (ADMELOG) corrective regimen sliding scale   SubCutaneous at bedtime  multivitamin 1 Tablet(s) Oral daily  nystatin Powder 1 Application(s) Topical two times a day  pantoprazole    Tablet 40 milliGRAM(s) Oral before breakfast  polyethylene glycol 3350 17 Gram(s) Oral daily  sacubitril 24 mG/valsartan 26 mG 1 Tablet(s) Oral two times a day  senna 2 Tablet(s) Oral at bedtime  spironolactone 25 milliGRAM(s) Oral daily    MEDICATIONS  (PRN):  acetaminophen   Tablet .. 650 milliGRAM(s) Oral every 6 hours PRN Temp greater or equal to 38C (100.4F), Mild Pain (1 - 3)  benzocaine 15 mG/menthol 3.6 mG (Sugar-Free) Lozenge 1 Lozenge Oral every 2 hours PRN Sore Throat  haloperidol    Injectable 0.25 milliGRAM(s) IV Push every 8 hours PRN Agitation  hemorrhoidal Ointment 1 Application(s) Rectal four times a day PRN rectal irritation            REVIEW OF SYSTEMS:  Constitutional: [ ] fever, [ ]weight loss,  [x ]fatigue  Eyes: [ ] visual changes  Respiratory: [ ]shortness of breath;  [ ] cough, [ ]wheezing, [ ]chills, [ ]hemoptysis  Cardiovascular: [ ] chest pain, [ ]palpitations, [ ]dizziness,  [ ]leg swelling[ ]orthopnea[ ]PND  Gastrointestinal: [ ] abdominal pain, [ ]nausea, [ ]vomiting,  [ ]diarrhea [ ]Constipation [ ]Melena  Genitourinary: [ ] dysuria, [ ] hematuria [ ]Esquivel  Neurologic: [ ] headaches [ ] tremors[ ]weakness [ ]Paralysis Right[ ] Left[ ]  Skin: [ ] itching, [ ]burning, [ ] rashes  Endocrine: [ ] heat or cold intolerance  Musculoskeletal: [ ] joint pain or swelling; [ ] muscle, back, or extremity pain  Psychiatric: [ ] depression, [ ]anxiety, [ ]mood swings, or [ ]difficulty sleeping  Hematologic: [ ] easy bruising, [ ] bleeding gums    [x] All remaining systems negative except as per above.   [ ]Unable to obtain.    Vital Signs Last 24 Hrs  T(C): 36.4 (17 Sep 2021 03:58), Max: 36.7 (17 Sep 2021 00:32)  T(F): 97.5 (17 Sep 2021 03:58), Max: 98.1 (17 Sep 2021 00:32)  HR: 82 (17 Sep 2021 05:42) (68 - 87)  BP: 112/80 (17 Sep 2021 05:42) (90/48 - 117/66)  RR: 18 (17 Sep 2021 03:58) (17 - 18)  SpO2: 94% (17 Sep 2021 03:58) (94% - 97%)  I&O's Summary    15 Sep 2021 07:01  -  16 Sep 2021 07:00  --------------------------------------------------------  IN: 1270 mL / OUT: 650 mL / NET: 620 mL    16 Sep 2021 07:01  -  17 Sep 2021 06:37  --------------------------------------------------------  IN: 515 mL / OUT: 500 mL / NET: 15 mL        PHYSICAL EXAM:  General: No acute distress BMI-28  HEENT: EOMI, PERRL  Neck: Supple, [ ] JVD  Lungs: Equal air entry bilaterally; [ ] rales [ ] wheezing [ ] rhonchi  Heart: Regular rate and rhythm; [x ] murmur  3 /6 [x ] systolic [ ] diastolic [ ] radiation[ ] rubs [ ]  gallops  Abdomen: Nontender, bowel sounds present  Extremities: No clubbing, cyanosis, [x ] edema [ ]Pulses  equal and intact  Nervous system:  Alert & Oriented X3, no focal deficits  Psychiatric: Normal affect  Skin: No rashes or lesions    LABS:  09-16    139  |  94<L>  |  50<H>  ----------------------------<  122<H>  3.7   |  34<H>  |  0.86    Ca    9.1      16 Sep 2021 07:23      Creatinine Trend: 0.86<--, 0.95<--, 0.82<--, 0.81<--, 0.82<--, 1.11<--                        9.0    10.49 )-----------( 219      ( 16 Sep 2021 07:23 )             32.1       Culture - Urine (09.14.21 @ 15:08)   Culture Results: >100,000 CFU/ml Escherichia coli ESBL     ECHO:Study Date: 8/27/2021  CONCLUSIONS:  1. Moderate to severe, eccentric postetriorly directed mitral regurgitation.  2.  Mild aortic regurgitation.  3. Severely dilated left atrium.  LA volume index = 95 cc/m2.  4. Normal left ventricular internal dimensions and wall thicknesses.  5. Endocardium not well visualized; grossly low normal left ventricular systolic function.   Segmental wallmotion could not be assessed.EF 55%  6. Unable to asses diastolic function due to technical aspects of this study.  7. Normal right ventricular size and function.   A device lead is visualized in the right heart.  8. RV systolic pressure is 62 mm Hg. Severe pulmonary hypertension.  9. There is severe tricuspid regurgitation.      IMPRESSION AND PLAN:  87 YO F with with a history of ACC/AHA Stage C NICM with improved LVEF (LV 4.9 cm, LVEF 40-45%) s/p CRT-D, severe mitral regurgitation being considered for MitraClip at [facility], chronic AF/AFl, and DM2 who was admitted to [facility] with ADHF and transferred to [facility] for consideration of MitraClip    Problem/Plan - 1:  ·  Problem: Acute decompensated heart failure.   ·  Plan: - GDMT: continue losartan 25 mg daily and spironolactone 25 mg daily. Will start BB when euvolemic and uptitrate losartan as tolerates   - Device: s/p CRT with 92% BiV pacing  -Discussed GOC with tanner Shaw explained Ms Ryan overall frail condition,albeit improved LV EF her condition and prognosis is poor-considering discharge to Home with Home care.  -On Furosemide PO  .Andrew and Entresto  -Tolerating Coreg  -Awaiting discharge to Home  -On IV Rocephin -Urine C&S-ESBL start Meropenem      Problem/Plan - 2:  ·  Problem: Severe mitral valve regurgitation.   ·  Plan: - Structural cardiology consulted for symptomatic severe MR, though in discussions with the team - the anatomic limitations of her mitral valve plus significant frailty would make her a poor candidate for any intervention.    Problem/Plan - 3:  ·  Problem: Chronic atrial fibrillation.   ·  Plan: - continue digoxin and Eliquis  -Resumed Eliquis      Problem/Plan - 4:  ·  Problem: Transaminitis.   ·  Plan: - due to congestion and resolved with diuresis.    Problem/Plan - 5:  ·  Problem: JUANY (acute kidney injury).   Resolved           Double O-Z Plasty Text: The defect edges were debeveled with a #15 scalpel blade.  Given the location of the defect, shape of the defect and the proximity to free margins a Double O-Z plasty (double transposition flap) was deemed most appropriate.  Using a sterile surgical marker, the appropriate transposition flaps were drawn incorporating the defect and placing the expected incisions within the relaxed skin tension lines where possible. The area thus outlined was incised deep to adipose tissue with a #15 scalpel blade.  The skin margins were undermined to an appropriate distance in all directions utilizing iris scissors.  Hemostasis was achieved with electrocautery.  The flaps were then transposed into place, one clockwise and the other counterclockwise, and anchored with interrupted buried subcutaneous sutures.

## 2022-11-11 NOTE — DISCHARGE NOTE PROVIDER - NSDCDCMDCOMP_GEN_ALL_CORE
Showering allowed/Stairs allowed/Walking - Indoors allowed/No heavy lifting/straining/Walking - Outdoors allowed This document is complete and the patient is ready for discharge.
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
## 2023-12-05 NOTE — ED ADULT NURSE NOTE - OBJECTIVE STATEMENT
pt is here for difficulty breathing.  BIBA, sending from nursing home for difficulty breathing, c/o difficulty to swallow, difficulty to hear, skin intact, harness voices, denied chest pain, peacemaker.
Admission

## 2024-03-20 NOTE — DISCHARGE NOTE PROVIDER - DID THE PATIENT PRESENT WITH OR WAS TREATED FOR MALNUTRITION DURING THIS ADMISSION
